# Patient Record
Sex: FEMALE | Race: ASIAN | Employment: FULL TIME | URBAN - METROPOLITAN AREA
[De-identification: names, ages, dates, MRNs, and addresses within clinical notes are randomized per-mention and may not be internally consistent; named-entity substitution may affect disease eponyms.]

---

## 2017-02-17 ENCOUNTER — PATIENT MESSAGE (OUTPATIENT)
Dept: FAMILY MEDICINE CLINIC | Facility: CLINIC | Age: 27
End: 2017-02-17

## 2017-02-17 NOTE — TELEPHONE ENCOUNTER
From: Dorie Quiroga  To: Kelley Lugo MD  Sent: 2/17/2017 12:41 PM CST  Subject: Other    Hello    My name is Daniel Patel. I am a patient of Dr. Gemini Vences.  I need to schedule an appointment for my annual check ups with her, but I was wondering if I c

## 2017-03-17 ENCOUNTER — OFFICE VISIT (OUTPATIENT)
Dept: FAMILY MEDICINE CLINIC | Facility: CLINIC | Age: 27
End: 2017-03-17

## 2017-03-17 VITALS
OXYGEN SATURATION: 98 % | TEMPERATURE: 99 F | HEIGHT: 59 IN | DIASTOLIC BLOOD PRESSURE: 60 MMHG | HEART RATE: 78 BPM | SYSTOLIC BLOOD PRESSURE: 104 MMHG | WEIGHT: 144.5 LBS | BODY MASS INDEX: 29.13 KG/M2 | RESPIRATION RATE: 16 BRPM

## 2017-03-17 DIAGNOSIS — Z01.419 WELL WOMAN EXAM WITH ROUTINE GYNECOLOGICAL EXAM: Primary | ICD-10-CM

## 2017-03-17 DIAGNOSIS — Z76.89 ESTABLISHING CARE WITH NEW DOCTOR, ENCOUNTER FOR: ICD-10-CM

## 2017-03-17 DIAGNOSIS — E55.9 VITAMIN D DEFICIENCY: ICD-10-CM

## 2017-03-17 DIAGNOSIS — Z00.00 LABORATORY EXAM ORDERED AS PART OF ROUTINE GENERAL MEDICAL EXAMINATION: ICD-10-CM

## 2017-03-17 PROCEDURE — 99395 PREV VISIT EST AGE 18-39: CPT | Performed by: FAMILY MEDICINE

## 2017-03-17 PROCEDURE — 88175 CYTOPATH C/V AUTO FLUID REDO: CPT | Performed by: FAMILY MEDICINE

## 2017-03-17 PROCEDURE — 87491 CHLMYD TRACH DNA AMP PROBE: CPT | Performed by: FAMILY MEDICINE

## 2017-03-17 PROCEDURE — 87591 N.GONORRHOEAE DNA AMP PROB: CPT | Performed by: FAMILY MEDICINE

## 2017-03-17 RX ORDER — ERGOCALCIFEROL 1.25 MG/1
50000 CAPSULE ORAL
COMMUNITY
End: 2017-12-02

## 2017-03-17 NOTE — PATIENT INSTRUCTIONS
Prevention Guidelines, Women Ages 25 to 44  Screening tests and vaccines are an important part of managing your health. Health counseling is essential, too. Below are guidelines for these, for women ages 25 to 44.  Talk with your healthcare provider to ma Chickenpox (varicella) All women in this age group who have no record of this infection or vaccine 2 doses; the second dose should be given 4 to 8 weeks after the first dose   Hepatitis A Women at increased risk for infection – talk with your healthcare pr Breast cancer and chemoprevention Women at high risk for breast cancer When your risk is known   Diet and exercise Women who are overweight or obese When diagnosed, and then at routine exams   Domestic violence Women at the age in which they are able to ha Many experts now say that women should focus on breast self-awareness instead of doing a breast self-examination (BSE).  These experts include the [address] Task Force, and the [facility] [employer] a © 5686-5955 [address], [address] [place]. All rights reserved. This information is not intended as a substitute for professional medical care. Always follow your healthcare professional's instructions.

## 2017-03-17 NOTE — PROGRESS NOTES
Patient presents with: Well Adult: pap  Establish Care      HPI:  27yr old female presents as a new patient for [employer] and to establish care. Doing well overall. No concerns per pt. Last PAP 3yrs ago and nl. No hx of abnormal paps or STDs.  Menses were regula Use: Yes                Comment: 1-2 beers a week        Current Outpatient Prescriptions:  ergocalciferol 09720 units Oral Cap Take 50,000 Units by mouth every 7 days.  Disp:  Rfl:        No Known Allergies      Physical Exam  /60 mmHg  Pulse 78  Tem 04/13/2011      HEP B                 03/30/2010 05/26/2010 08/24/2010      IPV                   03/30/2010 05/26/2010      MMR                   09/10/2008  10/21/2008      Meningococcal (Menomune)                          10/21/2008      TDAP

## 2017-03-19 LAB
C TRACH DNA SPEC QL NAA+PROBE: NEGATIVE
N GONORRHOEA DNA SPEC QL NAA+PROBE: NEGATIVE

## 2017-03-20 ENCOUNTER — TELEPHONE (OUTPATIENT)
Dept: FAMILY MEDICINE CLINIC | Facility: CLINIC | Age: 27
End: 2017-03-20

## 2017-03-20 NOTE — TELEPHONE ENCOUNTER
Spoke with patient who stated she was experiencing painful urination last night after having to hold her urine for several hours, she also noted sm blood strand, symptoms have improved since yesterday , offered her appointment but she refused stating she w

## 2017-03-23 ENCOUNTER — LAB ENCOUNTER (OUTPATIENT)
Dept: LAB | Facility: HOSPITAL | Age: 27
End: 2017-03-23
Attending: FAMILY MEDICINE
Payer: COMMERCIAL

## 2017-03-23 DIAGNOSIS — E55.9 VITAMIN D DEFICIENCY: ICD-10-CM

## 2017-03-23 DIAGNOSIS — Z00.00 LABORATORY EXAM ORDERED AS PART OF ROUTINE GENERAL MEDICAL EXAMINATION: ICD-10-CM

## 2017-03-23 LAB
25-HYDROXYVITAMIN D (TOTAL): 38.4 NG/ML (ref 30–100)
ALBUMIN SERPL-MCNC: 3.6 G/DL (ref 3.5–4.8)
ALP LIVER SERPL-CCNC: 114 U/L (ref 37–98)
ALT SERPL-CCNC: 59 U/L (ref 14–54)
AST SERPL-CCNC: 18 U/L (ref 15–41)
BASOPHILS # BLD AUTO: 0.08 X10(3) UL (ref 0–0.1)
BASOPHILS NFR BLD AUTO: 0.8 %
BILIRUB SERPL-MCNC: 0.6 MG/DL (ref 0.1–2)
BUN BLD-MCNC: 10 MG/DL (ref 8–20)
CALCIUM BLD-MCNC: 8.9 MG/DL (ref 8.3–10.3)
CHLORIDE: 106 MMOL/L (ref 101–111)
CHOLEST SMN-MCNC: 127 MG/DL (ref ?–200)
CO2: 25 MMOL/L (ref 22–32)
CREAT BLD-MCNC: 0.65 MG/DL (ref 0.55–1.02)
EOSINOPHIL # BLD AUTO: 0.35 X10(3) UL (ref 0–0.3)
EOSINOPHIL NFR BLD AUTO: 3.5 %
ERYTHROCYTE [DISTWIDTH] IN BLOOD BY AUTOMATED COUNT: 13.2 % (ref 11.5–16)
GLUCOSE BLD-MCNC: 99 MG/DL (ref 70–99)
HCT VFR BLD AUTO: 39.9 % (ref 34–50)
HDLC SERPL-MCNC: 44 MG/DL (ref 45–?)
HDLC SERPL: 2.89 {RATIO} (ref ?–4.44)
HGB BLD-MCNC: 12.9 G/DL (ref 12–16)
IMMATURE GRANULOCYTE COUNT: 0.04 X10(3) UL (ref 0–1)
IMMATURE GRANULOCYTE RATIO %: 0.4 %
LDLC SERPL CALC-MCNC: 70 MG/DL (ref ?–130)
LYMPHOCYTES # BLD AUTO: 2.95 X10(3) UL (ref 0.9–4)
LYMPHOCYTES NFR BLD AUTO: 29.6 %
M PROTEIN MFR SERPL ELPH: 7.5 G/DL (ref 6.1–8.3)
MCH RBC QN AUTO: 28.4 PG (ref 27–33.2)
MCHC RBC AUTO-ENTMCNC: 32.3 G/DL (ref 31–37)
MCV RBC AUTO: 87.7 FL (ref 81–100)
MONOCYTES # BLD AUTO: 0.86 X10(3) UL (ref 0.1–0.6)
MONOCYTES NFR BLD AUTO: 8.6 %
NEUTROPHIL ABS PRELIM: 5.67 X10 (3) UL (ref 1.3–6.7)
NEUTROPHILS # BLD AUTO: 5.67 X10(3) UL (ref 1.3–6.7)
NEUTROPHILS NFR BLD AUTO: 57.1 %
NONHDLC SERPL-MCNC: 83 MG/DL (ref ?–130)
PLATELET # BLD AUTO: 249 10(3)UL (ref 150–450)
POTASSIUM SERPL-SCNC: 4 MMOL/L (ref 3.6–5.1)
RBC # BLD AUTO: 4.55 X10(6)UL (ref 3.8–5.1)
RED CELL DISTRIBUTION WIDTH-SD: 42.8 FL (ref 35.1–46.3)
SODIUM SERPL-SCNC: 140 MMOL/L (ref 136–144)
TRIGLYCERIDES: 67 MG/DL (ref ?–150)
TSI SER-ACNC: 1.97 MIU/ML (ref 0.35–5.5)
VLDL: 13 MG/DL (ref 5–40)
WBC # BLD AUTO: 10 X10(3) UL (ref 4–13)

## 2017-03-23 PROCEDURE — 82306 VITAMIN D 25 HYDROXY: CPT

## 2017-03-23 PROCEDURE — 80053 COMPREHEN METABOLIC PANEL: CPT

## 2017-03-23 PROCEDURE — 36415 COLL VENOUS BLD VENIPUNCTURE: CPT

## 2017-03-23 PROCEDURE — 80061 LIPID PANEL: CPT

## 2017-03-23 PROCEDURE — 85025 COMPLETE CBC W/AUTO DIFF WBC: CPT

## 2017-03-23 PROCEDURE — 84443 ASSAY THYROID STIM HORMONE: CPT

## 2017-03-27 ENCOUNTER — OFFICE VISIT (OUTPATIENT)
Dept: FAMILY MEDICINE CLINIC | Facility: CLINIC | Age: 27
End: 2017-03-27

## 2017-03-27 VITALS
HEIGHT: 59 IN | WEIGHT: 144 LBS | HEART RATE: 78 BPM | DIASTOLIC BLOOD PRESSURE: 68 MMHG | BODY MASS INDEX: 29.03 KG/M2 | SYSTOLIC BLOOD PRESSURE: 108 MMHG | RESPIRATION RATE: 16 BRPM | TEMPERATURE: 98 F | OXYGEN SATURATION: 99 %

## 2017-03-27 DIAGNOSIS — E55.9 VITAMIN D INSUFFICIENCY: ICD-10-CM

## 2017-03-27 DIAGNOSIS — N30.00 ACUTE CYSTITIS WITHOUT HEMATURIA: Primary | ICD-10-CM

## 2017-03-27 DIAGNOSIS — R79.89 ELEVATED LIVER FUNCTION TESTS: ICD-10-CM

## 2017-03-27 LAB
MULTISTIX LOT#: NORMAL NUMERIC
PH, URINE: 7 (ref 4.5–8)
SPECIFIC GRAVITY: 1.02 (ref 1–1.03)
URINE-COLOR: YELLOW
UROBILINOGEN,SEMI-QN: 1 MG/DL (ref 0–1.9)

## 2017-03-27 PROCEDURE — 81003 URINALYSIS AUTO W/O SCOPE: CPT | Performed by: FAMILY MEDICINE

## 2017-03-27 PROCEDURE — 87088 URINE BACTERIA CULTURE: CPT | Performed by: FAMILY MEDICINE

## 2017-03-27 PROCEDURE — 87186 SC STD MICRODIL/AGAR DIL: CPT | Performed by: FAMILY MEDICINE

## 2017-03-27 PROCEDURE — 99214 OFFICE O/P EST MOD 30 MIN: CPT | Performed by: FAMILY MEDICINE

## 2017-03-27 PROCEDURE — 87086 URINE CULTURE/COLONY COUNT: CPT | Performed by: FAMILY MEDICINE

## 2017-03-27 RX ORDER — NITROFURANTOIN 25; 75 MG/1; MG/1
100 CAPSULE ORAL 2 TIMES DAILY
Qty: 10 CAPSULE | Refills: 0 | Status: SHIPPED | OUTPATIENT
Start: 2017-03-27 | End: 2017-04-01

## 2017-03-27 NOTE — PROGRESS NOTES
Amy Cortes is a 32year old female. HPI:   Patient presents with symptoms of UTI. Complaining of urinary frequency, urgency, and dysuria. Denies back pain, fever, hematuria, nausea and vomiting.  States she was withholding her urine the other day and s resolved  - cont vitamin d3 1000u daily otc    The patient indicates understanding of these issues and agrees to the plan. The patient is asked to return in 3 days if not better. Call if fever, vomiting, worsening symptoms.

## 2017-04-28 ENCOUNTER — OFFICE VISIT (OUTPATIENT)
Dept: FAMILY MEDICINE CLINIC | Facility: CLINIC | Age: 27
End: 2017-04-28

## 2017-04-28 DIAGNOSIS — B35.1 ONYCHOMYCOSIS: ICD-10-CM

## 2017-04-28 DIAGNOSIS — N92.6 IRREGULAR MENSES: ICD-10-CM

## 2017-04-28 DIAGNOSIS — N92.6 MISSED PERIOD: ICD-10-CM

## 2017-04-28 DIAGNOSIS — J01.00 ACUTE MAXILLARY SINUSITIS, RECURRENCE NOT SPECIFIED: Primary | ICD-10-CM

## 2017-04-28 PROCEDURE — 81025 URINE PREGNANCY TEST: CPT | Performed by: FAMILY MEDICINE

## 2017-04-28 PROCEDURE — 99214 OFFICE O/P EST MOD 30 MIN: CPT | Performed by: FAMILY MEDICINE

## 2017-04-28 RX ORDER — AMOXICILLIN 875 MG/1
875 TABLET, COATED ORAL 2 TIMES DAILY
Qty: 20 TABLET | Refills: 0 | Status: SHIPPED | OUTPATIENT
Start: 2017-04-28 | End: 2017-05-08

## 2017-04-28 NOTE — PROGRESS NOTES
HPI:   Valerie Cage is a 32year old female who presents for upper respiratory symptoms, missed/irregular periods and c/o nail changes. URI symptoms for  3-4  days.  Patient reports sore throat, congestion, low grade fever, dry cough, ear pain, sinus pa normocephalic, mild effusion behind both TMs, erythematous nasal mucosa, and throat is clear  NECK: supple,no adenopathy   LUNGS: clear to auscultation  CARDIO: RRR without murmur  GI: good BS's,no masses, HSM or tenderness    ASSESSMENT AND PLAN:   Abbey Lomeli

## 2017-04-28 NOTE — PATIENT INSTRUCTIONS
Self-Care for Sinusitis     Drinking plenty of water can help sinuses drain. Sinusitis can often be managed with self-care. Self-care can keep sinuses moist and make you feel more comfortable. Remember to follow your doctor's instructions closely.  This Normally, the uterine lining builds up and is shed each month during a woman’s period. With amenorrhea, this process does not happen. Menstruation occurs when a woman sheds the lining of her uterus (womb).  It is also called a “period.” For most women, th © 8566-9997 [address], [address] [place]. All rights reserved. This information is not intended as a substitute for professional medical care. Always follow your healthcare professional's instructions.

## 2017-04-29 VITALS
SYSTOLIC BLOOD PRESSURE: 102 MMHG | HEART RATE: 67 BPM | WEIGHT: 144.19 LBS | BODY MASS INDEX: 29.07 KG/M2 | RESPIRATION RATE: 18 BRPM | OXYGEN SATURATION: 99 % | DIASTOLIC BLOOD PRESSURE: 64 MMHG | TEMPERATURE: 99 F | HEIGHT: 59 IN

## 2017-06-29 ENCOUNTER — OFFICE VISIT (OUTPATIENT)
Dept: FAMILY MEDICINE CLINIC | Facility: CLINIC | Age: 27
End: 2017-06-29

## 2017-06-29 VITALS
DIASTOLIC BLOOD PRESSURE: 60 MMHG | HEIGHT: 59 IN | TEMPERATURE: 99 F | OXYGEN SATURATION: 98 % | RESPIRATION RATE: 16 BRPM | HEART RATE: 68 BPM | BODY MASS INDEX: 28.63 KG/M2 | SYSTOLIC BLOOD PRESSURE: 108 MMHG | WEIGHT: 142 LBS

## 2017-06-29 DIAGNOSIS — R11.2 NON-INTRACTABLE VOMITING WITH NAUSEA, UNSPECIFIED VOMITING TYPE: Primary | ICD-10-CM

## 2017-06-29 DIAGNOSIS — K64.0 GRADE I HEMORRHOIDS: ICD-10-CM

## 2017-06-29 PROCEDURE — 81025 URINE PREGNANCY TEST: CPT | Performed by: FAMILY MEDICINE

## 2017-06-29 PROCEDURE — 99214 OFFICE O/P EST MOD 30 MIN: CPT | Performed by: FAMILY MEDICINE

## 2017-06-29 NOTE — PROGRESS NOTES
HPI:    Patient ID: Alma Acosta is a 32year old female. HPI   27yr old female presents with c/o nausea and vomiting that began last night and flare up of her hemorrhoids. Began feeling nauseated last night and had one episode of nb,nb emesis.  State type  - urine hcg neg  - advised to push fluids, bland foods and advance as tolerated  - URINE PREGNANCY TEST    2. Grade I hemorrhoids  - advised symptomatic tx: sitz baths, start miralax daily to help prevent constipation, increase fruits/vegetables, inc

## 2017-09-15 ENCOUNTER — OFFICE VISIT (OUTPATIENT)
Dept: FAMILY MEDICINE CLINIC | Facility: CLINIC | Age: 27
End: 2017-09-15

## 2017-09-15 VITALS
WEIGHT: 143 LBS | OXYGEN SATURATION: 99 % | HEIGHT: 59 IN | HEART RATE: 77 BPM | SYSTOLIC BLOOD PRESSURE: 100 MMHG | BODY MASS INDEX: 28.83 KG/M2 | DIASTOLIC BLOOD PRESSURE: 62 MMHG | TEMPERATURE: 98 F | RESPIRATION RATE: 16 BRPM

## 2017-09-15 DIAGNOSIS — J30.89 CHRONIC NON-SEASONAL ALLERGIC RHINITIS, UNSPECIFIED TRIGGER: ICD-10-CM

## 2017-09-15 DIAGNOSIS — K29.00 ACUTE GASTRITIS WITHOUT HEMORRHAGE, UNSPECIFIED GASTRITIS TYPE: Primary | ICD-10-CM

## 2017-09-15 PROCEDURE — 99214 OFFICE O/P EST MOD 30 MIN: CPT | Performed by: FAMILY MEDICINE

## 2017-09-15 RX ORDER — OMEPRAZOLE 40 MG/1
40 CAPSULE, DELAYED RELEASE ORAL
Qty: 30 CAPSULE | Refills: 1 | Status: SHIPPED | OUTPATIENT
Start: 2017-09-15 | End: 2017-09-20 | Stop reason: DRUGHIGH

## 2017-09-16 ENCOUNTER — APPOINTMENT (OUTPATIENT)
Dept: LAB | Age: 27
End: 2017-09-16
Attending: INTERNAL MEDICINE
Payer: COMMERCIAL

## 2017-09-16 DIAGNOSIS — K29.00 ACUTE GASTRITIS WITHOUT HEMORRHAGE, UNSPECIFIED GASTRITIS TYPE: ICD-10-CM

## 2017-09-16 PROCEDURE — 83013 H PYLORI (C-13) BREATH: CPT

## 2017-09-18 LAB — H. PYLORI BREATH TEST: POSITIVE

## 2017-09-18 NOTE — PROGRESS NOTES
HPI:    Patient ID: Cookie Chen is a 32year old female. HPI  27yr old female presents with c/o LUQ abdominal pain and nausea for the past week and L ear drainage for the past day. LUQ abd pain with nausea, started about 1wk ago.  States she has not clear and moist.   Eyes: EOM are normal. Pupils are equal, round, and reactive to light. Neck: Neck supple. Cardiovascular: Normal rate and regular rhythm. Pulmonary/Chest: Effort normal and breath sounds normal. No respiratory distress.  She has no Sig: Take 1 capsule (40 mg total) by mouth every morning before breakfast. About 30-60min           Imaging & Referrals:  None       #[id number]

## 2017-09-20 ENCOUNTER — OFFICE VISIT (OUTPATIENT)
Dept: FAMILY MEDICINE CLINIC | Facility: CLINIC | Age: 27
End: 2017-09-20

## 2017-09-20 VITALS
TEMPERATURE: 98 F | OXYGEN SATURATION: 99 % | SYSTOLIC BLOOD PRESSURE: 104 MMHG | RESPIRATION RATE: 16 BRPM | HEIGHT: 59 IN | HEART RATE: 86 BPM | BODY MASS INDEX: 29.26 KG/M2 | WEIGHT: 145.13 LBS | DIASTOLIC BLOOD PRESSURE: 60 MMHG

## 2017-09-20 DIAGNOSIS — Z78.9 ATTEMPTING TO CONCEIVE: ICD-10-CM

## 2017-09-20 DIAGNOSIS — A04.8 HELICOBACTER PYLORI INFECTION: Primary | ICD-10-CM

## 2017-09-20 PROCEDURE — 99214 OFFICE O/P EST MOD 30 MIN: CPT | Performed by: FAMILY MEDICINE

## 2017-09-20 RX ORDER — CLARITHROMYCIN 500 MG/1
500 TABLET, COATED ORAL 2 TIMES DAILY
Qty: 28 TABLET | Refills: 0 | Status: SHIPPED | OUTPATIENT
Start: 2017-09-20 | End: 2017-10-04

## 2017-09-20 RX ORDER — NICOTINE POLACRILEX 4 MG/1
20 GUM, CHEWING ORAL 2 TIMES DAILY
Qty: 28 TABLET | Refills: 0 | Status: SHIPPED | OUTPATIENT
Start: 2017-09-20 | End: 2017-10-04

## 2017-09-20 RX ORDER — METRONIDAZOLE 500 MG/1
500 TABLET ORAL 2 TIMES DAILY
Qty: 28 TABLET | Refills: 0 | Status: SHIPPED | OUTPATIENT
Start: 2017-09-20 | End: 2017-10-04

## 2017-09-21 NOTE — PROGRESS NOTES
HPI:    Patient ID: Jessica Brenner is a 32year old female. HPI   27yr old female presents for f/u after recent lab testing and on gastritis. Would like advice on conceiving as well.     Review of Systems   Constitutional: Negative for appetite change, Helicobacter pylori infection  - h. pylori breath test (9/16/17): positive  - will tx with triple therapy: metronidazole 500mg po bid, clarithromycin 500mg po bid and omeprazole 20mg po bid x 14d, reviewed indications, dosing and SEs  - test of cure in [place]
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
## 2017-10-25 ENCOUNTER — TELEPHONE (OUTPATIENT)
Dept: FAMILY MEDICINE CLINIC | Facility: CLINIC | Age: 27
End: 2017-10-25

## 2017-10-25 NOTE — TELEPHONE ENCOUNTER
Patient stated she is doing fine and is going back on her medication today, she states that she got information for treatment for therapy and is following up.

## 2017-10-25 NOTE — TELEPHONE ENCOUNTER
----- Message from [facility], DO sent at 10/25/2017 10:40 AM CDT -----  Regarding: condition update  Pls check on pt status.

## 2017-10-31 ENCOUNTER — TELEPHONE (OUTPATIENT)
Dept: FAMILY MEDICINE CLINIC | Facility: CLINIC | Age: 27
End: 2017-10-31

## 2017-10-31 NOTE — TELEPHONE ENCOUNTER
S/w pt and informed her it it's likely a muscle strain as it hurts with certain positions and actions. Advised to try ibuprofen prn, ice/heat and gentle stretches. Monitor for any worsening s/s.  Pt due to f/u next week for re-eval and states she will make
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
## 2017-10-31 NOTE — TELEPHONE ENCOUNTER
Patient stated that since last visit 10/23/2017, she is still having throat pain ,mainly on the right side and having difficulty when swallowing, she states the pain is there in the morning when she wakes up and feels like there is a lump in her throat but

## 2017-11-21 ENCOUNTER — TELEPHONE (OUTPATIENT)
Dept: FAMILY MEDICINE CLINIC | Facility: CLINIC | Age: 27
End: 2017-11-21

## 2017-12-03 NOTE — PROGRESS NOTES
HPI:    Patient ID: Cookie Chen is a 32year old female. HPI  27yr old female presents for f/u on dysthymia, vitamin d deficiency and allergies. Dysthymia, started on fluoxetine 10mg daily approximately 1mo ago.  Notes improvement in mood, sleep, suzie rales.   Abdominal: Soft. Bowel sounds are normal. She exhibits no distension. There is no tenderness. There is no rebound. Neurological: She is alert and oriented to person, place, and time. Skin: Skin is warm and dry.    Psychiatric: She has a normal

## 2017-12-27 ENCOUNTER — OFFICE VISIT (OUTPATIENT)
Dept: FAMILY MEDICINE CLINIC | Facility: CLINIC | Age: 27
End: 2017-12-27

## 2017-12-27 VITALS
TEMPERATURE: 99 F | BODY MASS INDEX: 28.02 KG/M2 | DIASTOLIC BLOOD PRESSURE: 72 MMHG | RESPIRATION RATE: 18 BRPM | OXYGEN SATURATION: 98 % | HEART RATE: 77 BPM | WEIGHT: 139 LBS | HEIGHT: 59 IN | SYSTOLIC BLOOD PRESSURE: 110 MMHG

## 2017-12-27 DIAGNOSIS — N92.6 MISSED PERIOD: ICD-10-CM

## 2017-12-27 DIAGNOSIS — Z3A.01 LESS THAN 8 WEEKS GESTATION OF PREGNANCY: Primary | ICD-10-CM

## 2017-12-27 DIAGNOSIS — F34.1 DYSTHYMIA: ICD-10-CM

## 2017-12-27 PROCEDURE — 81025 URINE PREGNANCY TEST: CPT | Performed by: FAMILY MEDICINE

## 2017-12-27 PROCEDURE — 99214 OFFICE O/P EST MOD 30 MIN: CPT | Performed by: FAMILY MEDICINE

## 2017-12-28 NOTE — PROGRESS NOTES
HPI:    Patient ID: Stevenson Sunshine is a 32year old female. HPI  27yr old female presents with c/o missed period and wanting to confirm pregnancy. LMP 11/7/17. Had taken 2 pregnancy tests since yesterday and have been positive.  Denies any vaginal bleedi (primary encounter diagnosis)  Missed period  Dysthymia  - urine hcg: positive  - based on LMP 11/7/17, pt is 7wks 1d ga with KEIRY 8/14/18  - given order for OB ultrasound  - advised to start PNVs    - advised healthy eating habits and adequate hydration

## 2017-12-29 ENCOUNTER — TELEPHONE (OUTPATIENT)
Dept: FAMILY MEDICINE CLINIC | Facility: CLINIC | Age: 27
End: 2017-12-29

## 2017-12-29 DIAGNOSIS — Z3A.01 LESS THAN 8 WEEKS GESTATION OF PREGNANCY: Primary | ICD-10-CM

## 2017-12-29 NOTE — TELEPHONE ENCOUNTER
Pt would like to know if you can recc another ob/gyn the one she was referred to cannot see her until end of Jan

## 2018-01-02 ENCOUNTER — TELEPHONE (OUTPATIENT)
Dept: INTERNAL MEDICINE CLINIC | Facility: CLINIC | Age: 28
End: 2018-01-02

## 2018-01-02 ENCOUNTER — HOSPITAL ENCOUNTER (OUTPATIENT)
Dept: ULTRASOUND IMAGING | Facility: HOSPITAL | Age: 28
Discharge: HOME | End: 2018-01-02
Attending: FAMILY MEDICINE
Payer: COMMERCIAL

## 2018-01-02 DIAGNOSIS — Z3A.01 LESS THAN 8 WEEKS GESTATION OF PREGNANCY: ICD-10-CM

## 2018-01-02 PROCEDURE — 76817 TRANSVAGINAL US OBSTETRIC: CPT | Performed by: FAMILY MEDICINE

## 2018-01-02 PROCEDURE — 76801 OB US < 14 WKS SINGLE FETUS: CPT | Performed by: FAMILY MEDICINE

## 2018-01-03 NOTE — TELEPHONE ENCOUNTER
Received call from [facility] with report of STAT ultrasound for pregnancy. Single live IUP, 8 weeks, small subchorionic bleed. Attempted to call patient x 3 but went to voicemail with no identifying info given on message.  Please call patient with re

## 2018-01-03 NOTE — TELEPHONE ENCOUNTER
Pt called back, I entered her work number as call back # which she is requesting nurse uses when calling back, she said after the phone number is connected press 0 to reach her

## 2018-01-03 NOTE — TELEPHONE ENCOUNTER
Spoke with patient and she stated she was unable to get in to see ob until 01/18/2018, I placed call to Dr Windle Jeans 's office and informed of ultrasound findings, per reception , they will consult with nurse if this is an urgent finding and call me back if s

## 2018-01-03 NOTE — TELEPHONE ENCOUNTER
Spoke with patient and informed her of message per Dr Danilo Richter office and she stated that she found another Dr [facility] and has an appointment with them next week.

## 2018-01-09 LAB
HEPATITIS B SURFACE ANTIGEN OB: NEGATIVE
HIV RESULT OB: NEGATIVE
RAPID PLASMA REAGIN OB: NONREACTIVE
RH FACTOR OB: POSITIVE

## 2018-03-19 ENCOUNTER — TELEPHONE (OUTPATIENT)
Dept: FAMILY MEDICINE CLINIC | Facility: CLINIC | Age: 28
End: 2018-03-19

## 2018-03-19 NOTE — TELEPHONE ENCOUNTER
Spoke with pt and advised diabetes testing will typically be done around 28 weeks and if she has any concerns she should ask her OBG

## 2018-03-19 NOTE — TELEPHONE ENCOUNTER
Pt called stating she is pregnant. Wants to know if she should he yearly physical while pregnant. If so, do they check for diabetes?

## 2018-07-18 LAB — STREP GP B CULT OB: NEGATIVE

## 2018-08-20 ENCOUNTER — HOSPITAL ENCOUNTER (INPATIENT)
Facility: HOSPITAL | Age: 28
LOS: 3 days | Discharge: HOME | End: 2018-08-23
Attending: OBSTETRICS & GYNECOLOGY | Admitting: OBSTETRICS & GYNECOLOGY
Payer: COMMERCIAL

## 2018-08-20 ENCOUNTER — TELEPHONE (OUTPATIENT)
Dept: OBGYN UNIT | Facility: HOSPITAL | Age: 28
End: 2018-08-20

## 2018-08-20 ENCOUNTER — HOSPITAL ENCOUNTER (INPATIENT)
Dept: OBGYN CLINIC | Facility: HOSPITAL | Age: 28
Discharge: HOME | End: 2018-08-20
Payer: COMMERCIAL

## 2018-08-20 PROBLEM — Z34.90 PREGNANCY (HCC): Status: ACTIVE | Noted: 2018-08-20

## 2018-08-20 PROBLEM — Z34.90 PREGNANCY: Status: ACTIVE | Noted: 2018-08-20

## 2018-08-20 LAB
ANTIBODY SCREEN: NEGATIVE
BILIRUBIN URINE: NEGATIVE
BLOOD URINE: NEGATIVE
CONTROL RUN WITHIN 24 HOURS?: YES
ERYTHROCYTE [DISTWIDTH] IN BLOOD BY AUTOMATED COUNT: 14.2 % (ref 11.5–16)
GLUCOSE URINE: NEGATIVE
HCT VFR BLD AUTO: 36.3 % (ref 34–50)
HGB BLD-MCNC: 12.2 G/DL (ref 12–16)
KETONE URINE: NEGATIVE
LEUKOCYTE ESTERASE URINE: NEGATIVE
MCH RBC QN AUTO: 30.6 PG (ref 27–33.2)
MCHC RBC AUTO-ENTMCNC: 33.6 G/DL (ref 31–37)
MCV RBC AUTO: 91 FL (ref 81–100)
NITRITE URINE: NEGATIVE
PH URINE: 7 (ref 5–8)
PLATELET # BLD AUTO: 184 10(3)UL (ref 150–450)
PROTEIN URINE: NEGATIVE
RAPID HIV: NONREACTIVE
RBC # BLD AUTO: 3.99 X10(6)UL (ref 3.8–5.1)
RED CELL DISTRIBUTION WIDTH-SD: 47.4 FL (ref 35.1–46.3)
RH BLOOD TYPE: POSITIVE
SPEC GRAVITY: 1.02 (ref 1–1.03)
T PALLIDUM AB SER QL IA: NONREACTIVE
URINE CLARITY: CLEAR
URINE COLOR: YELLOW
UROBILINOGEN URINE: 1
WBC # BLD AUTO: 12.3 X10(3) UL (ref 4–13)

## 2018-08-20 PROCEDURE — 10H07YZ INSERTION OF OTHER DEVICE INTO PRODUCTS OF CONCEPTION, VIA NATURAL OR ARTIFICIAL OPENING: ICD-10-PCS | Performed by: OBSTETRICS & GYNECOLOGY

## 2018-08-20 PROCEDURE — 3E0P7VZ INTRODUCTION OF HORMONE INTO FEMALE REPRODUCTIVE, VIA NATURAL OR ARTIFICIAL OPENING: ICD-10-PCS | Performed by: OBSTETRICS & GYNECOLOGY

## 2018-08-20 PROCEDURE — 85027 COMPLETE CBC AUTOMATED: CPT | Performed by: OBSTETRICS & GYNECOLOGY

## 2018-08-20 PROCEDURE — 86701 HIV-1ANTIBODY: CPT | Performed by: OBSTETRICS & GYNECOLOGY

## 2018-08-20 PROCEDURE — 86850 RBC ANTIBODY SCREEN: CPT | Performed by: OBSTETRICS & GYNECOLOGY

## 2018-08-20 PROCEDURE — 86901 BLOOD TYPING SEROLOGIC RH(D): CPT | Performed by: OBSTETRICS & GYNECOLOGY

## 2018-08-20 PROCEDURE — 86900 BLOOD TYPING SEROLOGIC ABO: CPT | Performed by: OBSTETRICS & GYNECOLOGY

## 2018-08-20 PROCEDURE — 81002 URINALYSIS NONAUTO W/O SCOPE: CPT

## 2018-08-20 PROCEDURE — 4A1H74Z MONITORING OF PRODUCTS OF CONCEPTION, CARDIAC ELECTRICAL ACTIVITY, VIA NATURAL OR ARTIFICIAL OPENING: ICD-10-PCS | Performed by: OBSTETRICS & GYNECOLOGY

## 2018-08-20 PROCEDURE — 3E0E7KZ INTRODUCTION OF OTHER DIAGNOSTIC SUBSTANCE INTO PRODUCTS OF CONCEPTION, VIA NATURAL OR ARTIFICIAL OPENING: ICD-10-PCS | Performed by: OBSTETRICS & GYNECOLOGY

## 2018-08-20 PROCEDURE — 86780 TREPONEMA PALLIDUM: CPT | Performed by: OBSTETRICS & GYNECOLOGY

## 2018-08-20 RX ORDER — TERBUTALINE SULFATE 1 MG/ML
0.25 INJECTION, SOLUTION SUBCUTANEOUS AS NEEDED
Status: DISCONTINUED | OUTPATIENT
Start: 2018-08-20 | End: 2018-08-21

## 2018-08-20 RX ORDER — SODIUM CHLORIDE, SODIUM LACTATE, POTASSIUM CHLORIDE, CALCIUM CHLORIDE 600; 310; 30; 20 MG/100ML; MG/100ML; MG/100ML; MG/100ML
INJECTION, SOLUTION INTRAVENOUS CONTINUOUS
Status: DISCONTINUED | OUTPATIENT
Start: 2018-08-20 | End: 2018-08-21

## 2018-08-20 RX ORDER — TRISODIUM CITRATE DIHYDRATE AND CITRIC ACID MONOHYDRATE 500; 334 MG/5ML; MG/5ML
30 SOLUTION ORAL AS NEEDED
Status: DISCONTINUED | OUTPATIENT
Start: 2018-08-20 | End: 2018-08-21

## 2018-08-20 RX ORDER — DEXTROSE, SODIUM CHLORIDE, SODIUM LACTATE, POTASSIUM CHLORIDE, AND CALCIUM CHLORIDE 5; .6; .31; .03; .02 G/100ML; G/100ML; G/100ML; G/100ML; G/100ML
INJECTION, SOLUTION INTRAVENOUS AS NEEDED
Status: DISCONTINUED | OUTPATIENT
Start: 2018-08-20 | End: 2018-08-21

## 2018-08-20 RX ORDER — FERROUS SULFATE TAB EC 324 MG (65 MG FE EQUIVALENT) 324 (65 FE) MG
TABLET DELAYED RESPONSE ORAL
COMMUNITY
End: 2019-03-18 | Stop reason: ALTCHOICE

## 2018-08-20 RX ORDER — ZOLPIDEM TARTRATE 5 MG/1
5 TABLET ORAL NIGHTLY PRN
Status: DISCONTINUED | OUTPATIENT
Start: 2018-08-20 | End: 2018-08-21

## 2018-08-20 RX ORDER — IBUPROFEN 600 MG/1
600 TABLET ORAL ONCE AS NEEDED
Status: DISCONTINUED | OUTPATIENT
Start: 2018-08-20 | End: 2018-08-21

## 2018-08-21 PROCEDURE — 0KQM0ZZ REPAIR PERINEUM MUSCLE, OPEN APPROACH: ICD-10-PCS | Performed by: OBSTETRICS & GYNECOLOGY

## 2018-08-21 PROCEDURE — 3E033VJ INTRODUCTION OF OTHER HORMONE INTO PERIPHERAL VEIN, PERCUTANEOUS APPROACH: ICD-10-PCS | Performed by: OBSTETRICS & GYNECOLOGY

## 2018-08-21 PROCEDURE — 88307 TISSUE EXAM BY PATHOLOGIST: CPT | Performed by: OBSTETRICS & GYNECOLOGY

## 2018-08-21 RX ORDER — EPHEDRINE SULFATE/0.9% NACL/PF 25 MG/5 ML
5 SYRINGE (ML) INTRAVENOUS AS NEEDED
Status: DISCONTINUED | OUTPATIENT
Start: 2018-08-21 | End: 2018-08-21

## 2018-08-21 RX ORDER — NALBUPHINE HCL 10 MG/ML
2.5 AMPUL (ML) INJECTION
Status: DISCONTINUED | OUTPATIENT
Start: 2018-08-21 | End: 2018-08-21

## 2018-08-21 RX ORDER — EPHEDRINE SULFATE/0.9% NACL/PF 25 MG/5 ML
50 SYRINGE (ML) INTRAVENOUS ONCE
Status: COMPLETED | OUTPATIENT
Start: 2018-08-21 | End: 2018-08-21

## 2018-08-21 RX ORDER — ZOLPIDEM TARTRATE 5 MG/1
5 TABLET ORAL NIGHTLY PRN
Status: DISCONTINUED | OUTPATIENT
Start: 2018-08-21 | End: 2018-08-23

## 2018-08-21 RX ORDER — SIMETHICONE 80 MG
80 TABLET,CHEWABLE ORAL 3 TIMES DAILY PRN
Status: DISCONTINUED | OUTPATIENT
Start: 2018-08-21 | End: 2018-08-23

## 2018-08-21 RX ORDER — ACETAMINOPHEN 325 MG/1
650 TABLET ORAL EVERY 6 HOURS PRN
Status: DISCONTINUED | OUTPATIENT
Start: 2018-08-21 | End: 2018-08-23

## 2018-08-21 RX ORDER — DOCUSATE SODIUM 100 MG/1
100 CAPSULE, LIQUID FILLED ORAL
Status: DISCONTINUED | OUTPATIENT
Start: 2018-08-21 | End: 2018-08-23

## 2018-08-21 RX ORDER — IBUPROFEN 600 MG/1
600 TABLET ORAL EVERY 6 HOURS
Status: DISCONTINUED | OUTPATIENT
Start: 2018-08-22 | End: 2018-08-23

## 2018-08-21 RX ORDER — BISACODYL 10 MG
10 SUPPOSITORY, RECTAL RECTAL ONCE AS NEEDED
Status: ACTIVE | OUTPATIENT
Start: 2018-08-21 | End: 2018-08-21

## 2018-08-21 NOTE — PROGRESS NOTES
Patient changed position, srom, clear fluid noted to be leaking out. Positive amnitest. Decel for 4 mins to 70-90's. Return to [facility].  Cervidil removed

## 2018-08-21 NOTE — PROGRESS NOTES
Patient had epidural did drop BP. FHT decles post epidural. She is recovering from low BP and FHT looks better. Will continue to monitor and resume pitocin with recovery of FHT.

## 2018-08-21 NOTE — PROGRESS NOTES
Informed patient we will wait to see if she starts labor on her own. May start pitocin in morning if she doesn't start jhoan on her own.

## 2018-08-21 NOTE — H&P
[facility]    PATIENT'S NAME: Georges Ruiz   ATTENDING PHYSICIAN: Mariella Renee M.D.    PATIENT ACCOUNT#:   [de-identified]    LOCATION:  [address] [place] [address]  MEDICAL RECORD #:   [id number]       YOB: 1990  ADMISSION DATE:       08/20/ teeth removal.    MEDICATIONS:  Prenatal vitamins, fish oil, and Prometrium in the beginning of the pregnancy. ALLERGIES:  No known drug allergies. Denies latex allergy. Dust mite allergy.     SOCIAL HISTORY:  She denies smoking, alcohol, or any recrea

## 2018-08-21 NOTE — PROGRESS NOTES
Patient admitted to room 108 for scheduled cervidil IOL. EFA 40+6 weeks, EFM tested and applied. Plan of care discussed with patient and .  Questions encouraged and answered

## 2018-08-21 NOTE — PROGRESS NOTES
Patient got up to bathroom, when put back on monitor decel to 90-10 for 3.5 mins with return to baseline. patient instructed not to get out of bed anymore

## 2018-08-22 LAB
BASOPHILS # BLD AUTO: 0.05 X10(3) UL (ref 0–0.1)
BASOPHILS NFR BLD AUTO: 0.2 %
EOSINOPHIL # BLD AUTO: 0.09 X10(3) UL (ref 0–0.3)
EOSINOPHIL NFR BLD AUTO: 0.4 %
ERYTHROCYTE [DISTWIDTH] IN BLOOD BY AUTOMATED COUNT: 14.6 % (ref 11.5–16)
HCT VFR BLD AUTO: 30.8 % (ref 34–50)
HGB BLD-MCNC: 10.2 G/DL (ref 12–16)
IMMATURE GRANULOCYTE COUNT: 0.29 X10(3) UL (ref 0–1)
IMMATURE GRANULOCYTE RATIO %: 1.2 %
LYMPHOCYTES # BLD AUTO: 1.79 X10(3) UL (ref 0.9–4)
LYMPHOCYTES NFR BLD AUTO: 7.1 %
MCH RBC QN AUTO: 30.7 PG (ref 27–33.2)
MCHC RBC AUTO-ENTMCNC: 33.1 G/DL (ref 31–37)
MCV RBC AUTO: 92.8 FL (ref 81–100)
MONOCYTES # BLD AUTO: 1.77 X10(3) UL (ref 0.1–1)
MONOCYTES NFR BLD AUTO: 7.1 %
NEUTROPHIL ABS PRELIM: 21.06 X10 (3) UL (ref 1.3–6.7)
NEUTROPHILS # BLD AUTO: 21.06 X10(3) UL (ref 1.3–6.7)
NEUTROPHILS NFR BLD AUTO: 84 %
PLATELET # BLD AUTO: 165 10(3)UL (ref 150–450)
RBC # BLD AUTO: 3.32 X10(6)UL (ref 3.8–5.1)
RED CELL DISTRIBUTION WIDTH-SD: 50.1 FL (ref 35.1–46.3)
WBC # BLD AUTO: 25.1 X10(3) UL (ref 4–13)

## 2018-08-22 PROCEDURE — 85025 COMPLETE CBC W/AUTO DIFF WBC: CPT | Performed by: OBSTETRICS & GYNECOLOGY

## 2018-08-22 NOTE — PROCEDURES
Vaginal Delivery Note          Juantomeka Mann Patient Status:  Inpatient    1990 MRN [id number]   Location [facility] Attending Nicolas Guzmán MD, MD   [facility] Day # 1 membranes and 3VC noted and meconium stained. Examination of the cervix, vagina, and perineum demonstrated a lacerations as mentioned above. .  The lacerations were repaired using 2-0 vicryl and 3-0 vicryl  rapide in a running locked fashion in a standar

## 2018-08-22 NOTE — PROGRESS NOTES
Pt transferred via wheelchair, carrying infant, both in stable condition to room 2210. FOB @ their side. ID bands checked and verified. Report given to Robina Hill [place].

## 2018-08-22 NOTE — L&D DELIVERY NOTE
Latrice Mills Pablo  [[id number]]    Labor Events     labor?:  No   steroids?:  None  Cervical ripening date/time:  2018  Cervical ripening type:  Cervidil  Rupture date/time:  2018 0225     Rupture type:  AROM  Fluid color:  Clear  In pink    Heart rate Absent <100 bpm >100 bpm    Reflex irritability No response Grimace Cry or active withdrawal    Muscle tone Limp Some flexion Active motion    Respiratory effort Absent Weak cry; hypoventilation Good, crying               1 Minute:   5 M

## 2018-08-22 NOTE — PROGRESS NOTES
PPD 1    S: doing well, bleeding ok, pain ok with ibuprofen  O: BP 98/60   Pulse 102   Temp 98.8 °F (37.1 °C) (Oral)   Resp 16   Ht 4' 11\" (1.499 m)   Wt 155 lb (70.3 kg)   LMP 11/07/2017   SpO2 100%   Breastfeeding?  Yes   BMI 31.31 kg/m²     Recent Labs

## 2018-08-23 VITALS
RESPIRATION RATE: 17 BRPM | BODY MASS INDEX: 31.25 KG/M2 | SYSTOLIC BLOOD PRESSURE: 102 MMHG | HEIGHT: 59 IN | OXYGEN SATURATION: 100 % | HEART RATE: 86 BPM | WEIGHT: 155 LBS | TEMPERATURE: 98 F | DIASTOLIC BLOOD PRESSURE: 58 MMHG

## 2018-08-23 RX ORDER — IBUPROFEN 600 MG/1
600 TABLET ORAL EVERY 6 HOURS
Qty: 40 TABLET | Refills: 1 | Status: SHIPPED | OUTPATIENT
Start: 2018-08-23 | End: 2018-08-23

## 2018-08-23 RX ORDER — IBUPROFEN 600 MG/1
600 TABLET ORAL EVERY 6 HOURS
Qty: 40 TABLET | Refills: 1 | Status: SHIPPED | OUTPATIENT
Start: 2018-08-23 | End: 2019-03-18 | Stop reason: ALTCHOICE

## 2018-08-23 NOTE — PROGRESS NOTES
[facility]  Post-Partum Vaginal Delivery Progress Note    Alina Plasencia Patient Status:  Inpatient    1990 MRN [id number]   [facility] 2SW-J Attending Glory Mason MD, MD   [address] Day # 3 PCP Romayne Beans, DO SUBJECTI

## 2018-08-23 NOTE — DISCHARGE SUMMARY
[facility]  Discharge Summary    Alina Plasencia Patient Status:  Inpatient    1990 MRN [id number]   [facility] 2SW-J Attending Glory Mason MD, MD   [address] Day # 3 PCP Romayne Beans, DO     Date of Admission: 2018    D

## 2018-08-23 NOTE — LACTATION NOTE
Routine follow up visit to review discharge information. Patent requested assist with latch attempt, which was not successful. Mother is very awkward holding baby at breast and tends to hold by head only with no back support given to baby.     LC attempted

## 2018-08-27 ENCOUNTER — TELEPHONE (OUTPATIENT)
Dept: OBGYN UNIT | Facility: HOSPITAL | Age: 28
End: 2018-08-27

## 2018-11-08 RX ORDER — VITAMIN A ACETATE, .BETA.-CAROTENE, ASCORBIC ACID, CHOLECALCIFEROL, .ALPHA.-TOCOPHEROL ACETATE, DL-, THIAMINE MONONITRATE, RIBOFLAVIN, NIACINAMIDE, PYRIDOXINE HYDROCHLORIDE, FOLIC ACID, CYANOCOBALAMIN, CALCIUM CARBONATE, FERROUS FUMARATE, ZINC OXIDE, AND CUPRIC OXIDE 2000; 2000; 120; 400; 22; 1.84; 3; 20; 10; 1; 12; 200; 27; 25; 2 [IU]/1; [IU]/1; MG/1; [IU]/1; MG/1; MG/1; MG/1; MG/1; MG/1; MG/1; UG/1; MG/1; MG/1; MG/1; MG/1
TABLET ORAL
Qty: 90 TABLET | Refills: 9 | OUTPATIENT
Start: 2018-11-08

## 2018-11-08 NOTE — TELEPHONE ENCOUNTER
Prenatal Vit-Fe Fumarate-FA (PREPLUS) 27-1 MG Oral Tab  TK 1 T PO  QD  Refills: 11    Pnv Prenatal Plus Multivitamin 27-1 Mg Tab 08/15/2018   90 Undefined 10 90     Rx denied has refills.

## 2018-12-26 RX ORDER — VITAMIN A ACETATE, .BETA.-CAROTENE, ASCORBIC ACID, CHOLECALCIFEROL, .ALPHA.-TOCOPHEROL ACETATE, DL-, THIAMINE MONONITRATE, RIBOFLAVIN, NIACINAMIDE, PYRIDOXINE HYDROCHLORIDE, FOLIC ACID, CYANOCOBALAMIN, CALCIUM CARBONATE, FERROUS FUMARATE, ZINC OXIDE, AND CUPRIC OXIDE 2000; 2000; 120; 400; 22; 1.84; 3; 20; 10; 1; 12; 200; 27; 25; 2 [IU]/1; [IU]/1; MG/1; [IU]/1; MG/1; MG/1; MG/1; MG/1; MG/1; MG/1; UG/1; MG/1; MG/1; MG/1; MG/1
TABLET ORAL
Qty: 90 TABLET | Refills: 9 | OUTPATIENT
Start: 2018-12-26

## 2018-12-26 NOTE — TELEPHONE ENCOUNTER
PT LAST SEEN 12-27-17  DELIVERED 8/20/18    HAVE NO DOCUMENTATION AS IF PT IS BREASTFEEDING    NEEDS APPT FOR ANNUAL EXAM

## 2019-03-18 ENCOUNTER — LAB ENCOUNTER (OUTPATIENT)
Dept: LAB | Age: 29
End: 2019-03-18
Attending: FAMILY MEDICINE
Payer: COMMERCIAL

## 2019-03-18 ENCOUNTER — OFFICE VISIT (OUTPATIENT)
Dept: FAMILY MEDICINE CLINIC | Facility: CLINIC | Age: 29
End: 2019-03-18
Payer: COMMERCIAL

## 2019-03-18 VITALS
TEMPERATURE: 98 F | WEIGHT: 136 LBS | HEART RATE: 71 BPM | DIASTOLIC BLOOD PRESSURE: 61 MMHG | SYSTOLIC BLOOD PRESSURE: 98 MMHG | OXYGEN SATURATION: 98 % | RESPIRATION RATE: 18 BRPM | HEIGHT: 59 IN | BODY MASS INDEX: 27.42 KG/M2

## 2019-03-18 DIAGNOSIS — Z00.00 LABORATORY EXAM ORDERED AS PART OF ROUTINE GENERAL MEDICAL EXAMINATION: ICD-10-CM

## 2019-03-18 DIAGNOSIS — Z11.3 SCREENING FOR VENEREAL DISEASE: ICD-10-CM

## 2019-03-18 DIAGNOSIS — Z30.09 ENCOUNTER FOR COUNSELING REGARDING CONTRACEPTION: ICD-10-CM

## 2019-03-18 DIAGNOSIS — E55.9 VITAMIN D DEFICIENCY: ICD-10-CM

## 2019-03-18 DIAGNOSIS — Z00.00 ROUTINE GENERAL MEDICAL EXAMINATION AT A HEALTH CARE FACILITY: Primary | ICD-10-CM

## 2019-03-18 LAB
ALBUMIN SERPL-MCNC: 4 G/DL (ref 3.4–5)
ALBUMIN/GLOB SERPL: 1.1 {RATIO} (ref 1–2)
ALP LIVER SERPL-CCNC: 101 U/L (ref 37–98)
ALT SERPL-CCNC: 24 U/L (ref 13–56)
ANION GAP SERPL CALC-SCNC: 6 MMOL/L (ref 0–18)
AST SERPL-CCNC: 14 U/L (ref 15–37)
BASOPHILS # BLD AUTO: 0.05 X10(3) UL (ref 0–0.2)
BASOPHILS NFR BLD AUTO: 0.7 %
BILIRUB SERPL-MCNC: 0.8 MG/DL (ref 0.1–2)
BUN BLD-MCNC: 10 MG/DL (ref 7–18)
BUN/CREAT SERPL: 15.9 (ref 10–20)
CALCIUM BLD-MCNC: 9 MG/DL (ref 8.5–10.1)
CHLORIDE SERPL-SCNC: 107 MMOL/L (ref 98–107)
CHOLEST SMN-MCNC: 120 MG/DL (ref ?–200)
CO2 SERPL-SCNC: 26 MMOL/L (ref 21–32)
CREAT BLD-MCNC: 0.63 MG/DL (ref 0.55–1.02)
DEPRECATED RDW RBC AUTO: 44.2 FL (ref 35.1–46.3)
EOSINOPHIL # BLD AUTO: 0.18 X10(3) UL (ref 0–0.7)
EOSINOPHIL NFR BLD AUTO: 2.4 %
ERYTHROCYTE [DISTWIDTH] IN BLOOD BY AUTOMATED COUNT: 13.4 % (ref 11–15)
GLOBULIN PLAS-MCNC: 3.5 G/DL (ref 2.8–4.4)
GLUCOSE BLD-MCNC: 91 MG/DL (ref 70–99)
HCT VFR BLD AUTO: 40.4 % (ref 35–48)
HDLC SERPL-MCNC: 50 MG/DL (ref 40–59)
HGB BLD-MCNC: 13 G/DL (ref 12–16)
IMM GRANULOCYTES # BLD AUTO: 0.02 X10(3) UL (ref 0–1)
IMM GRANULOCYTES NFR BLD: 0.3 %
LDLC SERPL CALC-MCNC: 56 MG/DL (ref ?–100)
LYMPHOCYTES # BLD AUTO: 2.59 X10(3) UL (ref 1–4)
LYMPHOCYTES NFR BLD AUTO: 33.9 %
M PROTEIN MFR SERPL ELPH: 7.5 G/DL (ref 6.4–8.2)
MCH RBC QN AUTO: 29 PG (ref 26–34)
MCHC RBC AUTO-ENTMCNC: 32.2 G/DL (ref 31–37)
MCV RBC AUTO: 90.2 FL (ref 80–100)
MONOCYTES # BLD AUTO: 0.64 X10(3) UL (ref 0.1–1)
MONOCYTES NFR BLD AUTO: 8.4 %
NEUTROPHILS # BLD AUTO: 4.15 X10 (3) UL (ref 1.5–7.7)
NEUTROPHILS # BLD AUTO: 4.15 X10(3) UL (ref 1.5–7.7)
NEUTROPHILS NFR BLD AUTO: 54.3 %
NONHDLC SERPL-MCNC: 70 MG/DL (ref ?–130)
OSMOLALITY SERPL CALC.SUM OF ELEC: 287 MOSM/KG (ref 275–295)
PLATELET # BLD AUTO: 206 10(3)UL (ref 150–450)
POTASSIUM SERPL-SCNC: 4.1 MMOL/L (ref 3.5–5.1)
RBC # BLD AUTO: 4.48 X10(6)UL (ref 3.8–5.3)
SODIUM SERPL-SCNC: 139 MMOL/L (ref 136–145)
TRIGL SERPL-MCNC: 68 MG/DL (ref 30–149)
TSI SER-ACNC: 0.97 MIU/ML (ref 0.36–3.74)
VIT D+METAB SERPL-MCNC: 33.5 NG/ML (ref 30–100)
VLDLC SERPL CALC-MCNC: 14 MG/DL (ref 0–30)
WBC # BLD AUTO: 7.6 X10(3) UL (ref 4–11)

## 2019-03-18 PROCEDURE — 87491 CHLMYD TRACH DNA AMP PROBE: CPT | Performed by: FAMILY MEDICINE

## 2019-03-18 PROCEDURE — 80050 GENERAL HEALTH PANEL: CPT | Performed by: FAMILY MEDICINE

## 2019-03-18 PROCEDURE — 82306 VITAMIN D 25 HYDROXY: CPT | Performed by: FAMILY MEDICINE

## 2019-03-18 PROCEDURE — 87591 N.GONORRHOEAE DNA AMP PROB: CPT | Performed by: FAMILY MEDICINE

## 2019-03-18 PROCEDURE — 80061 LIPID PANEL: CPT | Performed by: FAMILY MEDICINE

## 2019-03-18 PROCEDURE — 99395 PREV VISIT EST AGE 18-39: CPT | Performed by: FAMILY MEDICINE

## 2019-03-18 NOTE — PATIENT INSTRUCTIONS
Prevention Guidelines, Women Ages 25 to 44  Screening tests and vaccines are an important part of managing your health. A screening test is done to find possible disorders or diseases in people who don't have any symptoms.  The goal is to find a disease e Type 2 diabetes All women with prediabetes Every year   Gonorrhea Sexually active women at increased risk for infection At routine exams   Hepatitis C Anyone at increased risk At routine exams   HIV All women should be tested at least once for HIV between Meningococcal Women at increased risk for infection should talk with their healthcare provider 1 or more doses   Pneumococcal conjugate vaccine (PCV13) and pneumococcal polysaccharide vaccine (PPSV23) Women at increased risk for infection should talk with © 2683-2732 [employer] [address] [facility], [facility] [place]. All rights reserved. This information is not intended as a substitute for professional medical care. Always follow your healthcare professional's instructions.

## 2019-03-18 NOTE — PROGRESS NOTES
Patient presents with:  Physical      HPI:  29yr old female with vit d deficiency presents for routine adult physical and contraception counseling. Doing well overall. Menses regular, LMP 2/27/19, lasts about 5d. Last pap in 2017 and nl.  No hx of abnl pa every 7 days.  Disp: 4 capsule Rfl: 2       No Known Allergies      Physical Exam  BP 98/61   Pulse 71   Temp 97.9 °F (36.6 °C) (Oral)   Resp 18   Ht 59\"   Wt 136 lb   LMP 02/21/2019   SpO2 98%   BMI 27.47 kg/m²   Constitutional: Oriented to person, place, recommends screening for cervical cancer in women ages 24 to 72 years with cytology (Pap smear) every 3 years or, for women ages 27 to 72 years who want to lengthen the screening interval, screening with a combination of cytology and human papillomavirus (

## 2019-03-19 LAB
C TRACH DNA SPEC QL NAA+PROBE: NEGATIVE
N GONORRHOEA DNA SPEC QL NAA+PROBE: NEGATIVE

## 2019-03-26 ENCOUNTER — OFFICE VISIT (OUTPATIENT)
Dept: FAMILY MEDICINE CLINIC | Facility: CLINIC | Age: 29
End: 2019-03-26
Payer: COMMERCIAL

## 2019-03-26 VITALS
SYSTOLIC BLOOD PRESSURE: 102 MMHG | RESPIRATION RATE: 18 BRPM | BODY MASS INDEX: 28.43 KG/M2 | WEIGHT: 141 LBS | DIASTOLIC BLOOD PRESSURE: 70 MMHG | HEART RATE: 86 BPM | HEIGHT: 59 IN | TEMPERATURE: 98 F | OXYGEN SATURATION: 98 %

## 2019-03-26 DIAGNOSIS — F43.9 STRESS AT HOME: ICD-10-CM

## 2019-03-26 DIAGNOSIS — Z63.8 FAMILY CONFLICT: ICD-10-CM

## 2019-03-26 DIAGNOSIS — J40 BRONCHITIS: Primary | ICD-10-CM

## 2019-03-26 PROCEDURE — 99213 OFFICE O/P EST LOW 20 MIN: CPT | Performed by: FAMILY MEDICINE

## 2019-03-26 RX ORDER — AZITHROMYCIN 250 MG/1
TABLET, FILM COATED ORAL
Qty: 6 TABLET | Refills: 0 | Status: SHIPPED | OUTPATIENT
Start: 2019-03-26 | End: 2019-04-22 | Stop reason: ALTCHOICE

## 2019-03-26 SDOH — SOCIAL STABILITY - SOCIAL INSECURITY: OTHER SPECIFIED PROBLEMS RELATED TO PRIMARY SUPPORT GROUP: Z63.8

## 2019-03-26 NOTE — PROGRESS NOTES
HPI:   Anton Shukla is a 34year old female who presents for upper respiratory symptoms and stress at home. C/o URI symptoms for the past 2wks.  Patient reports itchy/sore throat only at the beginning of sx's, congestion, cough with greenish/yellow color CARDIO: RRR without murmur    ASSESSMENT AND PLAN:   Becky Bolaños is a 34year old female who presents with:  1.  Bronchitis  - advised symptomatic tx: push fluids, keep well hydrated, salt water gargles, tea with honey/lemon, antihistamine nightly, aleah

## 2019-04-09 ENCOUNTER — TELEPHONE (OUTPATIENT)
Dept: FAMILY MEDICINE CLINIC | Facility: CLINIC | Age: 29
End: 2019-04-09

## 2019-04-09 NOTE — TELEPHONE ENCOUNTER
Called pt to verify that she has checked with her insurance regarding coverage    Pt definitely wants the Masina 49 IUD no matter what the cost is    We can order through our system    Pt just asking that we send her a My chart message with the approx cost

## 2019-04-11 ENCOUNTER — TELEPHONE (OUTPATIENT)
Dept: FAMILY MEDICINE CLINIC | Facility: CLINIC | Age: 29
End: 2019-04-11

## 2019-04-11 NOTE — TELEPHONE ENCOUNTER
LM  Message for patient informing her that [employer] Certain IUD is in the office and that she is ok to schedule iud  Placement .

## 2019-04-15 NOTE — TELEPHONE ENCOUNTER
Patient returned call and scheduled IUD placement.     Future Appointments   Date Time Provider Frankie Kumar   4/22/2019  3:30 PM Balaji Guy,  [facility]

## 2019-04-22 ENCOUNTER — OFFICE VISIT (OUTPATIENT)
Dept: FAMILY MEDICINE CLINIC | Facility: CLINIC | Age: 29
End: 2019-04-22
Payer: COMMERCIAL

## 2019-04-22 VITALS
WEIGHT: 144 LBS | HEART RATE: 93 BPM | HEIGHT: 59 IN | BODY MASS INDEX: 29.03 KG/M2 | RESPIRATION RATE: 16 BRPM | DIASTOLIC BLOOD PRESSURE: 64 MMHG | OXYGEN SATURATION: 99 % | TEMPERATURE: 98 F | SYSTOLIC BLOOD PRESSURE: 102 MMHG

## 2019-04-22 DIAGNOSIS — J30.9 ALLERGIC RHINITIS, UNSPECIFIED SEASONALITY, UNSPECIFIED TRIGGER: ICD-10-CM

## 2019-04-22 DIAGNOSIS — Z30.430 ENCOUNTER FOR INSERTION OF INTRAUTERINE CONTRACEPTIVE DEVICE: Primary | ICD-10-CM

## 2019-04-22 PROCEDURE — 81025 URINE PREGNANCY TEST: CPT | Performed by: FAMILY MEDICINE

## 2019-04-22 PROCEDURE — 58300 INSERT INTRAUTERINE DEVICE: CPT | Performed by: FAMILY MEDICINE

## 2019-04-22 PROCEDURE — 99213 OFFICE O/P EST LOW 20 MIN: CPT | Performed by: FAMILY MEDICINE

## 2019-04-22 NOTE — PROGRESS NOTES
HPI:    Patient ID: Jessica Brenner is a 34year old female. HPI   29yr old  female presents for [facility] IUD insertion. Denies any vaginal or UTI symptoms. LMP: 3/28/19. C/o nasal congestion and rhinorrhea since last night.  Denies any f/c, cough, sore for [facility] IUD placement, all risks and benefits were discussed. After all questions were answered, the pt was positioned in stirups for procedure. On exam, a small retroverted uterus was found.  The sterile speculum was placed a normal cervix seen, this w

## 2019-04-22 NOTE — PATIENT INSTRUCTIONS
Birth Control: IUD (Intrauterine Device)    The IUD (intrauterine device) is small, flexible, and T-shaped. A trained healthcare provider places it in the uterus. The IUD is one of the most effective birth control methods. It is also reversible.  This oj · A sexually transmitted infection (STI) or possible STI  · Liver problems  · Blood clots (for progestin IUD only)  · Breast cancer or a history of breast cancer (progestin IUD only)   Date Last Reviewed: 3/1/2017  © 0347-5358 [employer] [address]. 80

## 2019-04-25 ENCOUNTER — TELEPHONE (OUTPATIENT)
Dept: FAMILY MEDICINE CLINIC | Facility: CLINIC | Age: 29
End: 2019-04-25

## 2019-04-25 NOTE — TELEPHONE ENCOUNTER
Pt was just here for IUD insertion on 4/22/19    Symptoms \"are exactly the same as last month\"  Pt was caring for son who has been sick x 2 weeks \"until he started antibiotics\"    Pt declines appt, asking for same Rx \"or something stronger\"    Nose v

## 2019-04-25 NOTE — TELEPHONE ENCOUNTER
Patient states she saw Dr Theo Liu on 3.26.19 for bronchitis. She was prescribed antibiotics. She states it helped, but the symptoms have returned. She isn't sure if she needs more antibiotics? Transferred to triage voicemail for further details.     Tete

## 2019-04-26 ENCOUNTER — OFFICE VISIT (OUTPATIENT)
Dept: FAMILY MEDICINE CLINIC | Facility: CLINIC | Age: 29
End: 2019-04-26
Payer: COMMERCIAL

## 2019-04-26 VITALS
HEIGHT: 59 IN | TEMPERATURE: 99 F | OXYGEN SATURATION: 98 % | SYSTOLIC BLOOD PRESSURE: 104 MMHG | BODY MASS INDEX: 29.03 KG/M2 | WEIGHT: 144 LBS | DIASTOLIC BLOOD PRESSURE: 64 MMHG | RESPIRATION RATE: 16 BRPM | HEART RATE: 84 BPM

## 2019-04-26 DIAGNOSIS — J20.9 BRONCHITIS WITH BRONCHOSPASM: Primary | ICD-10-CM

## 2019-04-26 PROCEDURE — 99214 OFFICE O/P EST MOD 30 MIN: CPT | Performed by: FAMILY MEDICINE

## 2019-04-26 RX ORDER — DOXYCYCLINE HYCLATE 100 MG
100 TABLET ORAL 2 TIMES DAILY
Qty: 14 TABLET | Refills: 0 | Status: SHIPPED | OUTPATIENT
Start: 2019-04-26 | End: 2019-05-03

## 2019-04-26 RX ORDER — CEFUROXIME AXETIL 500 MG/1
500 TABLET ORAL 2 TIMES DAILY
Qty: 14 TABLET | Refills: 0 | Status: SHIPPED | OUTPATIENT
Start: 2019-04-26 | End: 2019-04-26

## 2019-04-26 RX ORDER — CODEINE PHOSPHATE AND GUAIFENESIN 10; 100 MG/5ML; MG/5ML
SOLUTION ORAL
Qty: 118 ML | Refills: 0 | Status: SHIPPED | OUTPATIENT
Start: 2019-04-26 | End: 2019-08-05 | Stop reason: ALTCHOICE

## 2019-04-26 RX ORDER — BENZONATATE 100 MG/1
100 CAPSULE ORAL 3 TIMES DAILY PRN
Qty: 30 CAPSULE | Refills: 0 | Status: SHIPPED | OUTPATIENT
Start: 2019-04-26 | End: 2019-08-05 | Stop reason: ALTCHOICE

## 2019-04-26 NOTE — PROGRESS NOTES
HPI:   Urvashi Amador is a 34year old female who presents for upper respiratory symptoms for  1  weeks.  Patient reports sore throat, congestion, low grade fever, dry cough, cough with yellowish colored sputum, ear pain, OTC cold meds have not been helping water gargles, tea with honey/lemon, antihistamine, delsym prn and tylenol/motrin prn  - will tx with doxycycline x 7d, cheratussin po qhs prn cough and tessalon perles po tid prn cough, reviewed indications, dosing and SEs  - patient indicates understandi

## 2019-06-23 ENCOUNTER — PATIENT MESSAGE (OUTPATIENT)
Dept: FAMILY MEDICINE CLINIC | Facility: CLINIC | Age: 29
End: 2019-06-23

## 2019-06-24 NOTE — TELEPHONE ENCOUNTER
Appt scheduled for today    Future Appointments   Date Time Provider Franike Kumar   6/24/2019  3:15 PM Rashel Guy,  [facility]

## 2019-06-24 NOTE — TELEPHONE ENCOUNTER
Patient called to schedule an appointment. She wanted today, but there are no openings. Offered tomorrow, but she says she is working. She asked me to let Dr Yusuf Henderson know that it is urgent that she be seen today.   She wants to know if we can squeeze her

## 2019-06-24 NOTE — TELEPHONE ENCOUNTER
From: Valencia Nunez  To: Justin Wilder DO  Sent: 6/23/2019 6:28 AM CDT  Subject: Prescription Question    Hi Dr Nikki Johansen you are doing well. I was wondering if you could send me an antibiotic prescription for UTI. Let me know.      Thanks,

## 2019-08-05 ENCOUNTER — OFFICE VISIT (OUTPATIENT)
Dept: FAMILY MEDICINE CLINIC | Facility: CLINIC | Age: 29
End: 2019-08-05
Payer: COMMERCIAL

## 2019-08-05 VITALS
BODY MASS INDEX: 25.68 KG/M2 | RESPIRATION RATE: 16 BRPM | SYSTOLIC BLOOD PRESSURE: 100 MMHG | WEIGHT: 127.38 LBS | DIASTOLIC BLOOD PRESSURE: 56 MMHG | OXYGEN SATURATION: 99 % | HEIGHT: 59 IN | HEART RATE: 79 BPM | TEMPERATURE: 98 F

## 2019-08-05 DIAGNOSIS — T74.11XA PHYSICAL ABUSE OF ADULT BY PARTNER, INITIAL ENCOUNTER: ICD-10-CM
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
## 2019-08-05 DIAGNOSIS — R45.89 SYMPTOMS OF DEPRESSION: ICD-10-CM

## 2019-08-05 DIAGNOSIS — Z63.0 MARITAL CONFLICT: Primary | ICD-10-CM

## 2019-08-05 PROCEDURE — 99213 OFFICE O/P EST LOW 20 MIN: CPT | Performed by: FAMILY MEDICINE

## 2019-08-05 SDOH — SOCIAL STABILITY - SOCIAL INSECURITY: PROBLEMS IN RELATIONSHIP WITH SPOUSE OR PARTNER: Z63.0
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
## 2019-10-07 ENCOUNTER — TELEPHONE (OUTPATIENT)
Dept: FAMILY MEDICINE CLINIC | Facility: CLINIC | Age: 29
End: 2019-10-07

## 2019-10-07 NOTE — TELEPHONE ENCOUNTER
Patient came in and asked for her medical records just the ov  Notes , sending to medical records to be done , she is asking for it to be done by the 20 th ???

## 2020-01-29 PROBLEM — L68.0 HIRSUTISM: Status: ACTIVE | Noted: 2020-01-29

## 2020-03-17 ENCOUNTER — OFFICE VISIT (OUTPATIENT)
Dept: FAMILY MEDICINE CLINIC | Facility: CLINIC | Age: 30
End: 2020-03-17
Payer: COMMERCIAL

## 2020-03-17 VITALS
DIASTOLIC BLOOD PRESSURE: 66 MMHG | SYSTOLIC BLOOD PRESSURE: 106 MMHG | HEART RATE: 76 BPM | WEIGHT: 129.38 LBS | HEIGHT: 58.66 IN | TEMPERATURE: 99 F | RESPIRATION RATE: 16 BRPM | OXYGEN SATURATION: 98 % | BODY MASS INDEX: 26.44 KG/M2

## 2020-03-17 DIAGNOSIS — J20.9 BRONCHITIS WITH BRONCHOSPASM: ICD-10-CM

## 2020-03-17 DIAGNOSIS — Z00.00 LABORATORY EXAM ORDERED AS PART OF ROUTINE GENERAL MEDICAL EXAMINATION: ICD-10-CM

## 2020-03-17 DIAGNOSIS — J30.9 ALLERGIC RHINITIS, UNSPECIFIED SEASONALITY, UNSPECIFIED TRIGGER: ICD-10-CM

## 2020-03-17 DIAGNOSIS — R05.9 COUGH: ICD-10-CM

## 2020-03-17 DIAGNOSIS — Z01.419 WELL WOMAN EXAM WITH ROUTINE GYNECOLOGICAL EXAM: Primary | ICD-10-CM

## 2020-03-17 DIAGNOSIS — Z30.432 ENCOUNTER FOR REMOVAL OF INTRAUTERINE CONTRACEPTIVE DEVICE: ICD-10-CM

## 2020-03-17 DIAGNOSIS — B35.1 ONYCHOMYCOSIS OF RIGHT GREAT TOE: ICD-10-CM

## 2020-03-17 PROCEDURE — 99214 OFFICE O/P EST MOD 30 MIN: CPT | Performed by: FAMILY MEDICINE

## 2020-03-17 PROCEDURE — 99395 PREV VISIT EST AGE 18-39: CPT | Performed by: FAMILY MEDICINE

## 2020-03-17 PROCEDURE — 58301 REMOVE INTRAUTERINE DEVICE: CPT | Performed by: FAMILY MEDICINE

## 2020-03-17 PROCEDURE — 87624 HPV HI-RISK TYP POOLED RSLT: CPT | Performed by: FAMILY MEDICINE

## 2020-03-17 PROCEDURE — 87625 HPV TYPES 16 & 18 ONLY: CPT | Performed by: FAMILY MEDICINE

## 2020-03-17 RX ORDER — BENZONATATE 200 MG/1
200 CAPSULE ORAL 3 TIMES DAILY PRN
Qty: 30 CAPSULE | Refills: 0 | Status: SHIPPED | OUTPATIENT
Start: 2020-03-17 | End: 2020-07-02

## 2020-03-17 RX ORDER — CEFUROXIME AXETIL 500 MG/1
500 TABLET ORAL 2 TIMES DAILY
Qty: 14 TABLET | Refills: 0 | Status: SHIPPED | OUTPATIENT
Start: 2020-03-17 | End: 2020-03-24

## 2020-03-17 NOTE — PATIENT INSTRUCTIONS
Breast Health: Breast Self-Awareness  What is breast self-awareness? Breast self-awareness is knowing how your breasts normally look and feel. Your breasts change as you go through different stages of your life.  So it’s important to learn what is normal It’s okay to be upset if you find a lump. Be sure to call your provider right away. Remember that most breast lumps are benign. This means they are not cancer. Date Last Reviewed: 8/1/2017  © 1089-5083 [employer] [address].  Jacinto Neal [address] Des[address] Chlamydia Sexually active women ages 22 and younger, and women at increased risk for infection (such as having multiple sex partners) Every year if you're at risk or have symptoms   Depression All women in this age group At routine exams   Type 2 diabetes, Hepatitis B Women at increased risk for infection should talk with their healthcare provider 3 doses over 6 months; second dose should be given 1 month after the first dose; the third dose should be given at least 2 months after the second dose and at leas Sexually transmitted infection prevention Women who are sexually active At routine exams   Skin cancer Prevention of skin cancer in fair-skinned adults At routine exams   Use of tobacco and the health effects it can cause All women in this age group Every

## 2020-03-17 NOTE — PROGRESS NOTES
Patient presents with:  Physical: cough   IUD: removal       HPI:  35yr old female presents for her WWE, IUD removal and c/o URI symptoms. Last pap done 3yrs ago. Menses have been regular, due any day now.   Requesting removal of her IUD, has finalized a d Smokeless tobacco: Never Used    Alcohol use: No      Comment: 1-2 beers a week    Drug use: No      Current Outpatient Medications   Medication Sig Dispense Refill   • Cefuroxime Axetil 500 MG Oral Tab Take 1 tablet (500 mg total) by mouth 2 (two) time Lymphadenopathy: No cervical adenopathy. Neurological: Normal reflexes. No cranial nerve deficit or sensory deficit. Normal muscle tone. Coordination normal.   Skin: Skin is warm and dry. No rash noted.  No erythema   Psychiatric: Normal mood and affect exam  (primary encounter diagnosis)  Laboratory exam ordered as part of routine general medical examination  Encounter for removal of intrauterine contraceptive device  Bronchitis with bronchospasm  Cough  Allergic rhinitis, unspecified seasonality, unspec

## 2020-03-20 ENCOUNTER — LAB ENCOUNTER (OUTPATIENT)
Dept: LAB | Age: 30
End: 2020-03-20
Attending: FAMILY MEDICINE
Payer: COMMERCIAL

## 2020-03-20 DIAGNOSIS — Z00.00 LABORATORY EXAM ORDERED AS PART OF ROUTINE GENERAL MEDICAL EXAMINATION: ICD-10-CM

## 2020-03-20 LAB
ALBUMIN SERPL-MCNC: 3.9 G/DL (ref 3.4–5)
ALBUMIN/GLOB SERPL: 0.9 {RATIO} (ref 1–2)
ALP LIVER SERPL-CCNC: 76 U/L (ref 37–98)
ALT SERPL-CCNC: 21 U/L (ref 13–56)
ANION GAP SERPL CALC-SCNC: 3 MMOL/L (ref 0–18)
AST SERPL-CCNC: 10 U/L (ref 15–37)
BASOPHILS # BLD AUTO: 0.12 X10(3) UL (ref 0–0.2)
BASOPHILS NFR BLD AUTO: 1.3 %
BILIRUB SERPL-MCNC: 0.9 MG/DL (ref 0.1–2)
BUN BLD-MCNC: 7 MG/DL (ref 7–18)
BUN/CREAT SERPL: 11.1 (ref 10–20)
CALCIUM BLD-MCNC: 8.8 MG/DL (ref 8.5–10.1)
CHLORIDE SERPL-SCNC: 106 MMOL/L (ref 98–112)
CHOLEST SMN-MCNC: 121 MG/DL (ref ?–200)
CO2 SERPL-SCNC: 28 MMOL/L (ref 21–32)
CREAT BLD-MCNC: 0.63 MG/DL (ref 0.55–1.02)
DEPRECATED RDW RBC AUTO: 43.2 FL (ref 35.1–46.3)
EOSINOPHIL # BLD AUTO: 0.25 X10(3) UL (ref 0–0.7)
EOSINOPHIL NFR BLD AUTO: 2.7 %
ERYTHROCYTE [DISTWIDTH] IN BLOOD BY AUTOMATED COUNT: 12.9 % (ref 11–15)
GLOBULIN PLAS-MCNC: 4.2 G/DL (ref 2.8–4.4)
GLUCOSE BLD-MCNC: 95 MG/DL (ref 70–99)
HCT VFR BLD AUTO: 42.4 % (ref 35–48)
HDLC SERPL-MCNC: 45 MG/DL (ref 40–59)
HGB BLD-MCNC: 13.4 G/DL (ref 12–16)
HPV I/H RISK 1 DNA SPEC QL NAA+PROBE: POSITIVE
IMM GRANULOCYTES # BLD AUTO: 0.04 X10(3) UL (ref 0–1)
IMM GRANULOCYTES NFR BLD: 0.4 %
LDLC SERPL CALC-MCNC: 59 MG/DL (ref ?–100)
LYMPHOCYTES # BLD AUTO: 2.52 X10(3) UL (ref 1–4)
LYMPHOCYTES NFR BLD AUTO: 27.6 %
M PROTEIN MFR SERPL ELPH: 8.1 G/DL (ref 6.4–8.2)
MCH RBC QN AUTO: 29.1 PG (ref 26–34)
MCHC RBC AUTO-ENTMCNC: 31.6 G/DL (ref 31–37)
MCV RBC AUTO: 92 FL (ref 80–100)
MONOCYTES # BLD AUTO: 0.68 X10(3) UL (ref 0.1–1)
MONOCYTES NFR BLD AUTO: 7.4 %
NEUTROPHILS # BLD AUTO: 5.53 X10 (3) UL (ref 1.5–7.7)
NEUTROPHILS # BLD AUTO: 5.53 X10(3) UL (ref 1.5–7.7)
NEUTROPHILS NFR BLD AUTO: 60.6 %
NONHDLC SERPL-MCNC: 76 MG/DL (ref ?–130)
OSMOLALITY SERPL CALC.SUM OF ELEC: 282 MOSM/KG (ref 275–295)
PATIENT FASTING Y/N/NP: YES
PATIENT FASTING Y/N/NP: YES
PLATELET # BLD AUTO: 243 10(3)UL (ref 150–450)
POTASSIUM SERPL-SCNC: 4.2 MMOL/L (ref 3.5–5.1)
RBC # BLD AUTO: 4.61 X10(6)UL (ref 3.8–5.3)
SODIUM SERPL-SCNC: 137 MMOL/L (ref 136–145)
TRIGL SERPL-MCNC: 83 MG/DL (ref 30–149)
TSI SER-ACNC: 1.9 MIU/ML (ref 0.36–3.74)
VLDLC SERPL CALC-MCNC: 17 MG/DL (ref 0–30)
WBC # BLD AUTO: 9.1 X10(3) UL (ref 4–11)

## 2020-03-20 PROCEDURE — 80061 LIPID PANEL: CPT | Performed by: FAMILY MEDICINE

## 2020-03-20 PROCEDURE — 36415 COLL VENOUS BLD VENIPUNCTURE: CPT | Performed by: FAMILY MEDICINE

## 2020-03-20 PROCEDURE — 80050 GENERAL HEALTH PANEL: CPT | Performed by: FAMILY MEDICINE

## 2020-03-22 LAB
HPV16 DNA CVX QL PROBE+SIG AMP: NEGATIVE
HPV18 DNA CVX QL PROBE+SIG AMP: NEGATIVE

## 2020-03-25 NOTE — PROGRESS NOTES
Tried calling pt again about test results but phone kept ringing. No answer. Abnormal results of pap, pap is normal but positive HPV. Will need to repeat in 1yr.

## 2020-06-29 ENCOUNTER — PATIENT MESSAGE (OUTPATIENT)
Dept: FAMILY MEDICINE CLINIC | Facility: CLINIC | Age: 30
End: 2020-06-29

## 2020-06-29 NOTE — TELEPHONE ENCOUNTER
From: [facility]  To: Edouard Morales DO  Sent: 6/29/2020 4:44 AM CDT  Subject: Test Results Question    Hi Dr Ebenezer Hernandez,    I hope you are well.  I am getting my facial hairs removed and the person who is doing the procedure highly recommended that I shoul

## 2020-07-02 ENCOUNTER — OFFICE VISIT (OUTPATIENT)
Dept: FAMILY MEDICINE CLINIC | Facility: CLINIC | Age: 30
End: 2020-07-02
Payer: COMMERCIAL

## 2020-07-02 VITALS
HEIGHT: 58.66 IN | HEART RATE: 82 BPM | WEIGHT: 133.13 LBS | SYSTOLIC BLOOD PRESSURE: 104 MMHG | OXYGEN SATURATION: 99 % | TEMPERATURE: 98 F | DIASTOLIC BLOOD PRESSURE: 72 MMHG | RESPIRATION RATE: 16 BRPM | BODY MASS INDEX: 27.2 KG/M2

## 2020-07-02 DIAGNOSIS — N92.6 IRREGULAR MENSES: Primary | ICD-10-CM

## 2020-07-02 DIAGNOSIS — L68.0 HIRSUTISM: ICD-10-CM

## 2020-07-02 PROCEDURE — 99213 OFFICE O/P EST LOW 20 MIN: CPT | Performed by: FAMILY MEDICINE

## 2020-07-02 NOTE — PATIENT INSTRUCTIONS
Understanding Uterine Bleeding  Your uterine bleeding may be heavy. Or you may have bleeding between periods. These problems may be caused by hormonal imbalance.  Or they can be caused by uterine growths, an intrauterine device (IUD), bleeding disorder, o © 7544-1253 [employer]. [address] [facility] [place], [facility] [place]. All rights reserved. This information is not intended as a substitute for professional medical care. Always follow your healthcare professional's instructions.

## 2020-07-02 NOTE — PROGRESS NOTES
HPI:    Patient ID: Bozena Grant is a 27year old female. HPI   35yr old  female presents with c/o hirsutism and irregular menses.  States she has started laser hair removal and was told by the  to check her hormones and for ovarian cys

## 2020-07-08 ENCOUNTER — OFFICE VISIT (OUTPATIENT)
Dept: PODIATRY CLINIC | Facility: CLINIC | Age: 30
End: 2020-07-08
Payer: COMMERCIAL

## 2020-07-08 DIAGNOSIS — B35.1 DERMATOPHYTOSIS OF NAIL: Primary | ICD-10-CM

## 2020-07-08 PROCEDURE — 99203 OFFICE O/P NEW LOW 30 MIN: CPT | Performed by: PODIATRIST

## 2020-07-12 NOTE — PROGRESS NOTES
Erasmo Wagner is a 27year old female. Patient presents with:  New Patient: right hallux nail has been infected for a long time -has tried topical lamisil - pain scale at worst 10/10.         HPI:   This pleasant patient presents to the clinic with a chief exertion  CARDIOVASCULAR: denies chest pain on exertion  GI: denies abdominal pain and denies heartburn  NEURO: denies headaches    EXAM:   LMP 06/23/2020   GENERAL: well developed, well nourished, in no apparent distress  EXTREMITIES:   1. Integument:  The

## 2020-07-16 ENCOUNTER — TELEPHONE (OUTPATIENT)
Dept: PODIATRY CLINIC | Facility: CLINIC | Age: 30
End: 2020-07-16

## 2020-07-16 DIAGNOSIS — B35.1 ONYCHOMYCOSIS: Primary | ICD-10-CM

## 2020-07-16 NOTE — TELEPHONE ENCOUNTER
Spoke with patient. Verified name and .  Informed patient per Dr. Ivy Damon of positive fungal culture and per Dr. Ivy Damon would like to treat with Lamisil, before treatment is ordered will need to do Hepatic function test. Patient wishes to proceed, hepat

## 2020-07-16 NOTE — TELEPHONE ENCOUNTER
----- Message from Prachi Willams DPM sent at 7/12/2020  4:44 PM CDT -----  Results reviewed. Please inform patient that fungal culture results are positive and we should proceed with Lamisil tablet therapy.   If the patient agrees we have to do a hepa

## 2020-07-22 ENCOUNTER — TELEPHONE (OUTPATIENT)
Dept: FAMILY MEDICINE CLINIC | Facility: CLINIC | Age: 30
End: 2020-07-22

## 2020-07-22 ENCOUNTER — MED REC SCAN ONLY (OUTPATIENT)
Dept: FAMILY MEDICINE CLINIC | Facility: CLINIC | Age: 30
End: 2020-07-22

## 2020-07-22 ENCOUNTER — OFFICE VISIT (OUTPATIENT)
Dept: FAMILY MEDICINE CLINIC | Facility: CLINIC | Age: 30
End: 2020-07-22
Payer: COMMERCIAL

## 2020-07-22 DIAGNOSIS — L68.0 HIRSUTISM: ICD-10-CM

## 2020-07-22 DIAGNOSIS — N92.6 IRREGULAR MENSES: ICD-10-CM

## 2020-07-22 DIAGNOSIS — Z30.011 ENCOUNTER FOR INITIAL PRESCRIPTION OF CONTRACEPTIVE PILLS: ICD-10-CM

## 2020-07-22 DIAGNOSIS — E28.2 PCOS (POLYCYSTIC OVARIAN SYNDROME): Primary | ICD-10-CM

## 2020-07-22 LAB
CONTROL LINE PRESENT WITH A CLEAR BACKGROUND (YES/NO): YES YES/NO
PREGNANCY TEST, URINE: NEGATIVE

## 2020-07-22 PROCEDURE — 81025 URINE PREGNANCY TEST: CPT | Performed by: FAMILY MEDICINE

## 2020-07-22 PROCEDURE — 99214 OFFICE O/P EST MOD 30 MIN: CPT | Performed by: FAMILY MEDICINE

## 2020-07-22 RX ORDER — DESOGESTREL AND ETHINYL ESTRADIOL 0.15-0.03
1 KIT ORAL DAILY
Qty: 3 PACKAGE | Refills: 0 | Status: SHIPPED | OUTPATIENT
Start: 2020-07-22 | End: 2020-10-23 | Stop reason: ALTCHOICE

## 2020-07-22 NOTE — PROGRESS NOTES
HPI:    Patient ID: Anton Shukla is a 27year old female. HPI   30yr old female presents for f/u on recent pelvic US results. Had [address] done to evaluate for ovarian cysts. Menarche at age 15.  Menses were regular from age 15 to 21 and then became irregu ovaries as can be seen with PCOS clinical setting.  2. Normal-appearing uterus and endometrium  - given symptoms of irregular menses, hirsutism and US results, pt does meet criteria for PCOS  - discussed dx and tx options of PCOS in detail with pt   - will

## 2020-09-08 ENCOUNTER — LAB ENCOUNTER (OUTPATIENT)
Dept: LAB | Age: 30
End: 2020-09-08
Attending: PODIATRIST
Payer: COMMERCIAL

## 2020-09-08 DIAGNOSIS — B35.1 ONYCHOMYCOSIS: ICD-10-CM

## 2020-09-08 LAB
ALBUMIN SERPL-MCNC: 3.6 G/DL (ref 3.4–5)
ALP LIVER SERPL-CCNC: 65 U/L (ref 37–98)
ALT SERPL-CCNC: 23 U/L (ref 13–56)
AST SERPL-CCNC: 13 U/L (ref 15–37)
BILIRUB DIRECT SERPL-MCNC: 0.2 MG/DL (ref 0–0.2)
BILIRUB SERPL-MCNC: 0.8 MG/DL (ref 0.1–2)
M PROTEIN MFR SERPL ELPH: 7.7 G/DL (ref 6.4–8.2)

## 2020-09-08 PROCEDURE — 80076 HEPATIC FUNCTION PANEL: CPT

## 2020-10-23 ENCOUNTER — TELEMEDICINE (OUTPATIENT)
Dept: FAMILY MEDICINE CLINIC | Facility: CLINIC | Age: 30
End: 2020-10-23

## 2020-10-23 ENCOUNTER — OFFICE VISIT (OUTPATIENT)
Dept: PODIATRY CLINIC | Facility: CLINIC | Age: 30
End: 2020-10-23
Payer: COMMERCIAL

## 2020-10-23 VITALS — RESPIRATION RATE: 16 BRPM

## 2020-10-23 DIAGNOSIS — B35.1 DERMATOPHYTOSIS OF NAIL: ICD-10-CM

## 2020-10-23 DIAGNOSIS — N92.6 IRREGULAR MENSES: ICD-10-CM

## 2020-10-23 DIAGNOSIS — B35.1 ONYCHOMYCOSIS: Primary | ICD-10-CM

## 2020-10-23 DIAGNOSIS — L68.0 HIRSUTISM: ICD-10-CM

## 2020-10-23 DIAGNOSIS — E28.2 PCOS (POLYCYSTIC OVARIAN SYNDROME): Primary | ICD-10-CM

## 2020-10-23 PROBLEM — Z34.90 PREGNANCY (HCC): Status: RESOLVED | Noted: 2018-08-20 | Resolved: 2020-10-23

## 2020-10-23 PROBLEM — Z34.90 PREGNANCY: Status: RESOLVED | Noted: 2018-08-20 | Resolved: 2020-10-23

## 2020-10-23 PROCEDURE — 99214 OFFICE O/P EST MOD 30 MIN: CPT | Performed by: FAMILY MEDICINE

## 2020-10-23 PROCEDURE — 99213 OFFICE O/P EST LOW 20 MIN: CPT | Performed by: PODIATRIST

## 2020-10-23 NOTE — PROGRESS NOTES
Video visit  Please note that the following visit was completed using two-way, real-time interactive audio and video communication.   This has been done in good [place] to provide continuity of care in the best interest of the provider-patient relationship, d person, place, and time. She appears well-developed and well-nourished. HENT:   Head: Normocephalic and atraumatic. Pulmonary/Chest: Effort normal.   Neurological: She is alert and oriented to person, place, and time.    Psychiatric: She has a normal mo

## 2020-10-26 NOTE — PROGRESS NOTES
Giancarlo Santiago is a 27year old female. Patient presents with:  Toenail Fungus        HPI:   Patient returns to clinic for checkup on her toenail she is now finished there is almost finished 6 weeks of Lamisil tablet therapy.   At today's visit reviewed [facility] REVIEW OF SYSTEMS:   Today reviewed systens as documented below  GENERAL HEALTH: feels well otherwise  SKIN: Refer to exam below  RESPIRATORY: denies shortness of breath with exertion  CARDIOVASCULAR: denies chest pain on exertion  GI: denies abdominal

## 2020-11-05 ENCOUNTER — LAB ENCOUNTER (OUTPATIENT)
Dept: LAB | Facility: HOSPITAL | Age: 30
End: 2020-11-05
Attending: PODIATRIST
Payer: COMMERCIAL

## 2020-11-05 DIAGNOSIS — B35.1 ONYCHOMYCOSIS: ICD-10-CM

## 2020-11-05 DIAGNOSIS — B35.1 DERMATOPHYTOSIS OF NAIL: ICD-10-CM

## 2020-11-05 PROCEDURE — 80076 HEPATIC FUNCTION PANEL: CPT

## 2020-11-05 PROCEDURE — 36415 COLL VENOUS BLD VENIPUNCTURE: CPT

## 2020-11-11 ENCOUNTER — PATIENT MESSAGE (OUTPATIENT)
Dept: PODIATRY CLINIC | Facility: CLINIC | Age: 30
End: 2020-11-11

## 2020-11-11 NOTE — TELEPHONE ENCOUNTER
S/w pt and she states she has been taking the lamisil for 6 wks, but the past 3 wks she developed itching all over her body, intermittently. She denies any hives, SOB, swelling, tightness in her throat, chest pain.  She denies any new soaps, detergents or f

## 2020-11-11 NOTE — TELEPHONE ENCOUNTER
From: Madeline Canales  To: Ezequiel Tan DPM  Sent: 11/11/2020 12:21 PM CST  Subject: Test Results Question    Dr Humera Prescott,     I haven't heard back from your office regarding my test results and if you send prescription for refill?  Also, is it possible

## 2020-11-11 NOTE — TELEPHONE ENCOUNTER
Claudia Coyle, [place]   11/11/2020  4:04 PM      Hepatic function panel is normal please call in the following prescription:   Lamisil 250 mg tablets  Dispense 45, no refill  Instructions take 1 tablet daily p.o.   Then please tell the patient to follow-u

## 2020-11-12 NOTE — TELEPHONE ENCOUNTER
Please have the patient stop taking the Lamisil tablets immediately and tell her to use Benadryl for the itching and if it does not go away then she should call us back thank you

## 2020-12-15 ENCOUNTER — TELEPHONE (OUTPATIENT)
Dept: PODIATRY CLINIC | Facility: CLINIC | Age: 30
End: 2020-12-15

## 2020-12-15 NOTE — TELEPHONE ENCOUNTER
Per pt asking to have lamisil prescribed again, states her last allergic reaction was not due to lamisil, states she is having foot pain. Please call thank you.

## 2020-12-15 NOTE — TELEPHONE ENCOUNTER
I am not too worried about a short-term gap but I do not want to take the chance of causing anaphylaxis. We should arrange consult with a allergist immunologist so that she can be tested for allergy to Lamisil tablets specifically.   After she completes th

## 2020-12-15 NOTE — TELEPHONE ENCOUNTER
S/w pt and informed her per Dr. Zakia Taylor message. She states she is not allergic to the lamisil and the symptoms she was having started prior to her taking the lamisil.  She was using a collagen supplement in her food and she believes this was causing the it

## 2020-12-15 NOTE — TELEPHONE ENCOUNTER
S/w pt who was tx with lamisil on 9/14/20 and completed #45 then she had new hepatic function on 11/5 that was normal. She sent message to office on 11/11 that she developed itching all of her body and Dr. Stan Stanford told her to stop lamisil and take benadryl

## 2020-12-18 NOTE — TELEPHONE ENCOUNTER
I know that the patient thinks that she can take the medication however I feel uncomfortable prescribing that when she had a possible allergic reaction and before I will prescribe it she needs to see an allergist.

## 2020-12-21 NOTE — TELEPHONE ENCOUNTER
Pt notified per Dr. Robbins Lacks message. Offered to give her a name of an allergist. She would like it sent through [employer].

## 2021-01-18 NOTE — TELEPHONE ENCOUNTER
LOV 7/22/2020    LAST LAB    LAST RX 10-23-20 60*2    Next OV No future appointments.     PROTOCOL    Name from pharmacy: metFORMIN HCl Oral Tablet 500 MG          Will file in chart as: METFORMIN  MG Oral Tab    Sig: TAKE 1 TABLET BY MOUTH TWO TIMES

## 2021-02-26 ENCOUNTER — TELEPHONE (OUTPATIENT)
Dept: FAMILY MEDICINE CLINIC | Facility: CLINIC | Age: 31
End: 2021-02-26

## 2021-02-26 NOTE — TELEPHONE ENCOUNTER
Pt called - scheduled her yearly physical for 3-19-21. She asked asked for Dr to place orders for allergy testing.   But also wants to know if she should go directly to an allergist.

## 2021-04-08 ENCOUNTER — OFFICE VISIT (OUTPATIENT)
Dept: FAMILY MEDICINE CLINIC | Facility: CLINIC | Age: 31
End: 2021-04-08
Payer: COMMERCIAL

## 2021-04-08 VITALS
HEIGHT: 58.66 IN | HEART RATE: 68 BPM | WEIGHT: 140.19 LBS | DIASTOLIC BLOOD PRESSURE: 62 MMHG | OXYGEN SATURATION: 97 % | RESPIRATION RATE: 16 BRPM | BODY MASS INDEX: 28.64 KG/M2 | TEMPERATURE: 97 F | SYSTOLIC BLOOD PRESSURE: 104 MMHG

## 2021-04-08 DIAGNOSIS — B35.1 ONYCHOMYCOSIS OF RIGHT GREAT TOE: ICD-10-CM

## 2021-04-08 DIAGNOSIS — L68.0 HIRSUTISM: ICD-10-CM

## 2021-04-08 DIAGNOSIS — Z00.00 LABORATORY EXAM ORDERED AS PART OF ROUTINE GENERAL MEDICAL EXAMINATION: ICD-10-CM

## 2021-04-08 DIAGNOSIS — Z01.419 WELL WOMAN EXAM WITH ROUTINE GYNECOLOGICAL EXAM: Primary | ICD-10-CM

## 2021-04-08 DIAGNOSIS — E28.2 PCOS (POLYCYSTIC OVARIAN SYNDROME): ICD-10-CM

## 2021-04-08 PROCEDURE — 3074F SYST BP LT 130 MM HG: CPT | Performed by: FAMILY MEDICINE

## 2021-04-08 PROCEDURE — 3008F BODY MASS INDEX DOCD: CPT | Performed by: FAMILY MEDICINE

## 2021-04-08 PROCEDURE — 3078F DIAST BP <80 MM HG: CPT | Performed by: FAMILY MEDICINE

## 2021-04-08 PROCEDURE — 99395 PREV VISIT EST AGE 18-39: CPT | Performed by: FAMILY MEDICINE

## 2021-04-08 PROCEDURE — 87624 HPV HI-RISK TYP POOLED RSLT: CPT | Performed by: FAMILY MEDICINE

## 2021-04-08 NOTE — PROGRESS NOTES
Patient presents with:  Physical: wwe. toe nail removal .      HPI:  26yr old female with PCOS presents for her WWE. Doing well overall. PCOS, menses have been regular per pt, getting them q30-34d. Has stopped metformin and notes feeling better.  Hirsutism Medications   Medication Sig Dispense Refill   • B Complex-C-Folic Acid Oral Tab Take by mouth. (Patient not taking: Reported on 4/8/2021 )     • VITAMIN D3 25 MCG (1000 UT) Oral Tab Take 1 tablet (1,000 Units total) by mouth daily.  (Patient not taking: Re Immunizations:   Immunization History  Administered            Date(s) Administered    DTP                   04/13/2011      HEP B                 03/30/2010 05/26/2010 08/24/2010      IPV                   03/30/2010 05/26/2010      Influenza and agrees with tx plan  - RTC in 1yr for next physical, sooner if needed    Orders Placed This Encounter      CBC With Diff      CMP      Lipid      TSH W Reflex To Free T4      ThinPrep PAP with HPV Reflex Request      Meds & Refills for this Visit:  Yared León

## 2021-04-08 NOTE — PATIENT INSTRUCTIONS
Breast Health: Breast Self-Awareness  What is breast self-awareness? Breast self-awareness is knowing how your breasts normally look and feel. Your breasts change as you go through different stages of your life.  So it’s important to learn what is normal right away. Remember that most breast lumps are benign. This means they are not cancer. Lisa last reviewed this educational content on 5/1/2020  © 0344-8904 [employer] [address]. All rights reserved.  This information is not intended as a substit this age group  At routine exams   Diabetes mellitus, type 2  Adults with no symptoms who are overweight or obese and have 1 or more other risk factors for diabetes  At least every 3 years.  Also, testing for diabetes during pregnancy after the 24th week.  infections or vaccines  1 or 2 doses   Meningococcal Women at increased risk for infection should talk with their healthcare provider  1 or more doses   Pneumococcal conjugate vaccine (PCV13) and pneumococcal polysaccharide vaccine (PPSV23)  Women at incre years be screened for HIV and those younger or older people at increased risk. The CDC recommends that everyone between the ages of 15 and 59 get tested for HIV at least once as part of routine health care.    Lisa last reviewed this educational content

## 2021-04-21 ENCOUNTER — LAB ENCOUNTER (OUTPATIENT)
Dept: LAB | Age: 31
End: 2021-04-21
Attending: FAMILY MEDICINE
Payer: COMMERCIAL

## 2021-04-21 DIAGNOSIS — Z00.00 LABORATORY EXAM ORDERED AS PART OF ROUTINE GENERAL MEDICAL EXAMINATION: ICD-10-CM

## 2021-04-21 PROCEDURE — 80061 LIPID PANEL: CPT | Performed by: FAMILY MEDICINE

## 2021-04-21 PROCEDURE — 80050 GENERAL HEALTH PANEL: CPT | Performed by: FAMILY MEDICINE

## 2021-05-04 ENCOUNTER — OFFICE VISIT (OUTPATIENT)
Dept: ORTHOPEDICS CLINIC | Facility: CLINIC | Age: 31
End: 2021-05-04
Payer: COMMERCIAL

## 2021-05-04 DIAGNOSIS — M79.674 PAIN OF TOE OF RIGHT FOOT: ICD-10-CM

## 2021-05-04 DIAGNOSIS — B35.1 ONYCHOMYCOSIS: Primary | ICD-10-CM

## 2021-05-04 PROCEDURE — 99202 OFFICE O/P NEW SF 15 MIN: CPT | Performed by: PODIATRIST

## 2021-05-04 PROCEDURE — 11730 AVULSION NAIL PLATE SIMPLE 1: CPT | Performed by: PODIATRIST

## 2021-05-04 RX ORDER — TERBINAFINE HYDROCHLORIDE 250 MG/1
250 TABLET ORAL DAILY
Qty: 30 TABLET | Refills: 2 | Status: SHIPPED | OUTPATIENT
Start: 2021-05-04 | End: 2021-07-27

## 2021-05-04 RX ORDER — EFINACONAZOLE 100 MG/ML
1 SOLUTION TOPICAL 2 TIMES DAILY
Qty: 1 BOTTLE | Refills: 2 | Status: SHIPPED | OUTPATIENT
Start: 2021-05-04 | End: 2021-11-02

## 2021-05-04 NOTE — PROGRESS NOTES
[facility] New Patient Note    CC: Patient presents with:  Toenail Fungus: Patient is here today for right great toenail that has been giving her issues for years now.        HPI: The patient is a 32year old female who presents today with compl can remove the nail and allow it to regrow but there is a 50% chance that the nail will be exactly the same in 1 year.   We can try to counter that by using Lamisil for that period of time of regrowth and also using something topical, hopefully we can get J

## 2021-05-10 ENCOUNTER — TELEPHONE (OUTPATIENT)
Dept: ORTHOPEDICS CLINIC | Facility: CLINIC | Age: 31
End: 2021-05-10

## 2021-08-10 ENCOUNTER — OFFICE VISIT (OUTPATIENT)
Dept: ORTHOPEDICS CLINIC | Facility: CLINIC | Age: 31
End: 2021-08-10
Payer: COMMERCIAL

## 2021-08-10 VITALS — HEART RATE: 67 BPM | OXYGEN SATURATION: 99 %

## 2021-08-10 DIAGNOSIS — B35.1 ONYCHOMYCOSIS: Primary | ICD-10-CM

## 2021-08-10 PROCEDURE — 99213 OFFICE O/P EST LOW 20 MIN: CPT | Performed by: PODIATRIST

## 2021-08-10 RX ORDER — TERBINAFINE HYDROCHLORIDE 250 MG/1
250 TABLET ORAL DAILY
Qty: 30 TABLET | Refills: 2 | Status: SHIPPED | OUTPATIENT
Start: 2021-08-10 | End: 2021-11-02

## 2021-08-10 NOTE — PROGRESS NOTES
[facility] Progress Note    Subjective:   Patient is here for follow-up of a nail issue. She had been on Lamisil more consistently now and back about 3 months we had removed the nail with a chance that may be a better nail will grow back.   But al

## 2021-10-21 ENCOUNTER — TELEPHONE (OUTPATIENT)
Dept: FAMILY MEDICINE CLINIC | Facility: CLINIC | Age: 31
End: 2021-10-21

## 2021-10-21 NOTE — TELEPHONE ENCOUNTER
Appointment For: Huber Triplettl ([id number])   Visit Type: 5 [facility] [phone])      11/1/2021    1:00 PM  20 mins.  Thu Zapata,  [facility] [address] [place] [address]      Patient Comments:   Have not had my period for the last 3 cycles. I am concerned.

## 2021-10-25 NOTE — TELEPHONE ENCOUNTER
Called patient who states is unable to come into the office on 11/1/21 for appt due to work conflict. Rescheduled appt for Tuesday.     Future Appointments   Date Time Provider Frankie Kumar   11/2/2021 11:40 AM Manjula Burks, DO [facility]

## 2021-10-25 NOTE — TELEPHONE ENCOUNTER
Called patient for appointment reason detail. States she took herself off of OCPs and has not had a period since. Denies any possibility of pregnancy. No abd pain or acute issues. Has noted some wt gain. Wants to discuss going back on OCPs.   States was

## 2021-11-02 ENCOUNTER — OFFICE VISIT (OUTPATIENT)
Dept: FAMILY MEDICINE CLINIC | Facility: CLINIC | Age: 31
End: 2021-11-02
Payer: COMMERCIAL

## 2021-11-02 VITALS
OXYGEN SATURATION: 98 % | SYSTOLIC BLOOD PRESSURE: 116 MMHG | BODY MASS INDEX: 31.95 KG/M2 | DIASTOLIC BLOOD PRESSURE: 70 MMHG | RESPIRATION RATE: 16 BRPM | TEMPERATURE: 98 F | WEIGHT: 156.38 LBS | HEART RATE: 70 BPM | HEIGHT: 58.66 IN

## 2021-11-02 DIAGNOSIS — N92.6 IRREGULAR MENSES: Primary | ICD-10-CM

## 2021-11-02 DIAGNOSIS — N64.4 BREAST PAIN: ICD-10-CM

## 2021-11-02 DIAGNOSIS — R10.2 PELVIC PAIN: ICD-10-CM

## 2021-11-02 DIAGNOSIS — E28.2 PCOS (POLYCYSTIC OVARIAN SYNDROME): ICD-10-CM

## 2021-11-02 DIAGNOSIS — Z80.3 FAMILY HISTORY OF BREAST CANCER: ICD-10-CM

## 2021-11-02 DIAGNOSIS — N63.20 BREAST MASS, LEFT: ICD-10-CM

## 2021-11-02 PROCEDURE — 3008F BODY MASS INDEX DOCD: CPT | Performed by: FAMILY MEDICINE

## 2021-11-02 PROCEDURE — 99214 OFFICE O/P EST MOD 30 MIN: CPT | Performed by: FAMILY MEDICINE

## 2021-11-02 PROCEDURE — 3078F DIAST BP <80 MM HG: CPT | Performed by: FAMILY MEDICINE

## 2021-11-02 PROCEDURE — 3074F SYST BP LT 130 MM HG: CPT | Performed by: FAMILY MEDICINE

## 2021-11-02 PROCEDURE — 81003 URINALYSIS AUTO W/O SCOPE: CPT | Performed by: FAMILY MEDICINE

## 2021-11-02 PROCEDURE — 81025 URINE PREGNANCY TEST: CPT | Performed by: FAMILY MEDICINE

## 2021-11-02 PROCEDURE — 87086 URINE CULTURE/COLONY COUNT: CPT | Performed by: FAMILY MEDICINE

## 2021-11-02 RX ORDER — MEDROXYPROGESTERONE ACETATE 10 MG/1
10 TABLET ORAL DAILY
Qty: 10 TABLET | Refills: 0 | Status: SHIPPED | OUTPATIENT
Start: 2021-11-02 | End: 2021-11-12

## 2021-11-02 RX ORDER — DESOGESTREL AND ETHINYL ESTRADIOL 0.15-0.03
1 KIT ORAL DAILY
Qty: 28 TABLET | Refills: 5 | Status: SHIPPED | OUTPATIENT
Start: 2021-11-02

## 2021-11-02 NOTE — PROGRESS NOTES
Subjective:   Patient ID: Charmaine Sumner is a 32year old female. HPI   31yr old female presents for c/o irregular menses with PCOS dx, pelvic pain, and breast pain. PCOS, had been regular with her menses previously.  However, over the past 6mo or so, h Right: Normal.      Left: Mass (noted at 11 o'clock) and tenderness (at 11 o'clock) present. Abdominal:      General: Bowel sounds are normal.      Palpations: Abdomen is soft. Tenderness: There is abdominal tenderness (mild in L pelvic region). Sunday of next period       Imaging & Referrals:  [facility] ANNA 2D+3D DIAGNOSTIC [facility]  BILAT (CPT=[id number])  US PELVIS W EV (DCI=[id number])

## 2021-11-12 ENCOUNTER — HOSPITAL ENCOUNTER (OUTPATIENT)
Dept: MAMMOGRAPHY | Age: 31
Discharge: HOME | End: 2021-11-12
Attending: FAMILY MEDICINE
Payer: COMMERCIAL

## 2021-11-12 ENCOUNTER — HOSPITAL ENCOUNTER (OUTPATIENT)
Dept: ULTRASOUND IMAGING | Age: 31
Discharge: HOME | End: 2021-11-12
Attending: FAMILY MEDICINE
Payer: COMMERCIAL

## 2021-11-12 DIAGNOSIS — N64.4 BREAST PAIN: ICD-10-CM

## 2021-11-12 DIAGNOSIS — N63.20 BREAST MASS, LEFT: ICD-10-CM

## 2021-11-12 DIAGNOSIS — Z80.3 FAMILY HISTORY OF BREAST CANCER: ICD-10-CM

## 2021-11-12 PROCEDURE — 77066 DX MAMMO INCL CAD BI: CPT | Performed by: FAMILY MEDICINE

## 2021-11-12 PROCEDURE — 77062 BREAST TOMOSYNTHESIS BI: CPT | Performed by: FAMILY MEDICINE

## 2021-11-12 PROCEDURE — 76642 ULTRASOUND BREAST LIMITED: CPT | Performed by: FAMILY MEDICINE

## 2021-12-02 ENCOUNTER — HOSPITAL ENCOUNTER (OUTPATIENT)
Dept: ULTRASOUND IMAGING | Facility: HOSPITAL | Age: 31
Discharge: HOME | End: 2021-12-02
Attending: FAMILY MEDICINE
Payer: COMMERCIAL

## 2021-12-02 ENCOUNTER — HOSPITAL ENCOUNTER (OUTPATIENT)
Dept: ULTRASOUND IMAGING | Age: 31
End: 2021-12-02
Attending: FAMILY MEDICINE
Payer: COMMERCIAL

## 2021-12-02 DIAGNOSIS — R10.2 PELVIC PAIN: ICD-10-CM

## 2021-12-02 PROCEDURE — 76856 US EXAM PELVIC COMPLETE: CPT | Performed by: FAMILY MEDICINE

## 2021-12-02 PROCEDURE — 76830 TRANSVAGINAL US NON-OB: CPT | Performed by: FAMILY MEDICINE

## 2021-12-02 PROCEDURE — 93975 VASCULAR STUDY: CPT | Performed by: FAMILY MEDICINE

## 2021-12-21 ENCOUNTER — OFFICE VISIT (OUTPATIENT)
Dept: ORTHOPEDICS CLINIC | Facility: CLINIC | Age: 31
End: 2021-12-21
Payer: COMMERCIAL

## 2021-12-21 ENCOUNTER — LAB ENCOUNTER (OUTPATIENT)
Dept: LAB | Age: 31
End: 2021-12-21
Attending: PODIATRIST
Payer: COMMERCIAL

## 2021-12-21 VITALS — HEIGHT: 58 IN | WEIGHT: 150 LBS | BODY MASS INDEX: 31.49 KG/M2

## 2021-12-21 DIAGNOSIS — B35.1 ONYCHOMYCOSIS: ICD-10-CM

## 2021-12-21 DIAGNOSIS — L03.031 PARONYCHIA OF TOE OF RIGHT FOOT: ICD-10-CM

## 2021-12-21 DIAGNOSIS — B35.1 ONYCHOMYCOSIS: Primary | ICD-10-CM

## 2021-12-21 PROCEDURE — 80076 HEPATIC FUNCTION PANEL: CPT | Performed by: PODIATRIST

## 2021-12-21 PROCEDURE — 99213 OFFICE O/P EST LOW 20 MIN: CPT | Performed by: PODIATRIST

## 2021-12-21 PROCEDURE — 3008F BODY MASS INDEX DOCD: CPT | Performed by: PODIATRIST

## 2021-12-21 RX ORDER — TERBINAFINE HYDROCHLORIDE 250 MG/1
250 TABLET ORAL DAILY
COMMUNITY
Start: 2021-11-02

## 2021-12-21 NOTE — PROGRESS NOTES
[facility] Progress [facility]    Subjective:     Patient is here for follow-up of fungal nails and ingrown nail. She has had the ingrown off and on and we had wanted to see how she did with the oral Lamisil before we went ahead with a permanent I&D.

## 2022-01-11 ENCOUNTER — PATIENT MESSAGE (OUTPATIENT)
Dept: FAMILY MEDICINE CLINIC | Facility: CLINIC | Age: 32
End: 2022-01-11

## 2022-01-12 NOTE — TELEPHONE ENCOUNTER
From: Jair Rider  To: Hieu Hanna DO  Sent: 1/11/2022 7:14 PM CST  Subject: Birth control pills    Fliplo Dr Zuniga,    I hope you are well. I have been taking the birth control pills you gave me.  Last month, on week 4 I had spotting, not a real flow

## 2022-02-15 ENCOUNTER — OFFICE VISIT (OUTPATIENT)
Dept: ORTHOPEDICS CLINIC | Facility: CLINIC | Age: 32
End: 2022-02-15
Payer: COMMERCIAL

## 2022-02-15 DIAGNOSIS — M79.674 PAIN OF TOE OF RIGHT FOOT: ICD-10-CM

## 2022-02-15 DIAGNOSIS — L60.0 ONYCHOCRYPTOSIS: Primary | ICD-10-CM

## 2022-02-15 PROCEDURE — 99213 OFFICE O/P EST LOW 20 MIN: CPT | Performed by: PODIATRIST

## 2022-02-15 PROCEDURE — 11750 EXCISION NAIL&NAIL MATRIX: CPT | Performed by: PODIATRIST

## 2022-02-15 NOTE — PROGRESS NOTES
[facility] Progress Note    Subjective:     Patient is here for long-term follow-up of the right great toenail. About a year ago we had removed the nail for fungus and she had gone on the oral meds as well as topical she is actually done pretty well but developed an ingrown nail on both sides and we had talked about doing a permanent removal of those corners        Objective:     Exam right great toe nail is in good condition a little thick at the distal aspect both nail borders are incurvated with mild edema and tenderness but no infection  NVSI                  Assessment/Plan:     Diagnoses and all orders for this visit:    Onychocryptosis    Pain of toe of right foot    She is no longer on the Lamisil because of elevated liver enzyme. Procedure and potential side effects discussed. Local anesthesia achieved to affected right great toe. Sterile prep followed. Small tournicot placed around base of toe for exsanguination. bilateral Nail corner removed with english anvil. 3 applications of phenol for 30 seconds each applied to nail matrix. Application of alcohol followed. Sterile dressing applied. Tournicot released. Good blood flow returned to affected digit. Pt appeared to tolerate procedure and anesthesia well. Instructions for aftercare followed. Follow up in 1-2 weeks. Teresa Pavon. Bart Osborne DPM  [facility]    [employer] speech recognition software was used to prepare this note. If a word or phrase is confusing, it is likely do to a failure of recognition. Please contact me with any questions or clarifications.

## 2022-03-22 ENCOUNTER — HOSPITAL ENCOUNTER (OUTPATIENT)
Age: 32
Discharge: HOME | End: 2022-03-22
Payer: COMMERCIAL

## 2022-03-22 VITALS
SYSTOLIC BLOOD PRESSURE: 106 MMHG | BODY MASS INDEX: 27.48 KG/M2 | DIASTOLIC BLOOD PRESSURE: 69 MMHG | WEIGHT: 140 LBS | HEIGHT: 60 IN | HEART RATE: 80 BPM | OXYGEN SATURATION: 98 % | RESPIRATION RATE: 15 BRPM | TEMPERATURE: 98 F

## 2022-03-22 DIAGNOSIS — J06.9 VIRAL URI: Primary | ICD-10-CM

## 2022-03-22 LAB
S PYO AG THROAT QL: NEGATIVE
SARS-COV-2 RNA RESP QL NAA+PROBE: NOT DETECTED

## 2022-03-22 PROCEDURE — 87880 STREP A ASSAY W/OPTIC: CPT | Performed by: NURSE PRACTITIONER

## 2022-03-22 PROCEDURE — 99203 OFFICE O/P NEW LOW 30 MIN: CPT | Performed by: NURSE PRACTITIONER

## 2022-03-22 PROCEDURE — U0002 COVID-19 LAB TEST NON-CDC: HCPCS | Performed by: NURSE PRACTITIONER

## 2022-03-22 RX ORDER — METHYLPREDNISOLONE 4 MG/1
TABLET ORAL
Qty: 1 EACH | Refills: 0 | Status: SHIPPED | OUTPATIENT
Start: 2022-03-22

## 2022-03-22 RX ORDER — BENZONATATE 100 MG/1
100 CAPSULE ORAL 3 TIMES DAILY PRN
Qty: 30 CAPSULE | Refills: 0 | Status: SHIPPED | OUTPATIENT
Start: 2022-03-22 | End: 2022-04-21

## 2022-03-24 ENCOUNTER — TELEPHONE (OUTPATIENT)
Dept: FAMILY MEDICINE CLINIC | Facility: CLINIC | Age: 32
End: 2022-03-24

## 2022-03-24 ENCOUNTER — TELEMEDICINE (OUTPATIENT)
Dept: FAMILY MEDICINE CLINIC | Facility: CLINIC | Age: 32
End: 2022-03-24

## 2022-03-24 DIAGNOSIS — J01.00 ACUTE NON-RECURRENT MAXILLARY SINUSITIS: Primary | ICD-10-CM

## 2022-03-24 PROCEDURE — 99213 OFFICE O/P EST LOW 20 MIN: CPT | Performed by: FAMILY MEDICINE

## 2022-03-24 RX ORDER — AMOXICILLIN 875 MG/1
875 TABLET, COATED ORAL 2 TIMES DAILY
Qty: 20 TABLET | Refills: 0 | Status: SHIPPED | OUTPATIENT
Start: 2022-03-24

## 2022-03-24 RX ORDER — ALBUTEROL SULFATE 90 UG/1
2 AEROSOL, METERED RESPIRATORY (INHALATION) EVERY 6 HOURS PRN
Qty: 1 EACH | Refills: 0 | Status: SHIPPED | OUTPATIENT
Start: 2022-03-24

## 2022-03-24 NOTE — PATIENT INSTRUCTIONS
Take the Amoxil 875 mg one tablet with breakfast and dinner for 10 days. While on the antibiotics, eat yogurt or take a probiotic to help replenish the good bacteria in your intestines. Use the Albuterol inhaler 2 puffs every 4 hours as needed for chest tightness, shortness of breath or wheezing. Finish the Medrol dose charleen as prescribed. Continue the [employer] as needed for the cough. Continue the Dayquil and Nyquil. You can use Chloraseptic lozenges every 2-3 hours as needed for throat discomfort. Push fluids and get plenty of rest.    Go to the immediate care or ER if you have increased difficulty breathing, chest pain or you are coughing up blood. Follow up with your doctor for any new or worsening symptoms.

## 2022-03-24 NOTE — TELEPHONE ENCOUNTER
Called patient who states her symptoms have gotten worse since IC visit on 3/22/22. Throat is very painful and has a hoarse voice. Productive cough with green sputum. Feels congestion in chest. Slight runny nose. Denies fever. Taking prednisone, benzonatate, dayquil. States is drinking plenty of fluids. Is not taking antihistamine, tylenol or ibuprofen. States has had this before and the only thing that helps is antibiotics. Advised would need video visit to evaluate if abx are appropriate. Scheduled for this afternoon.     Future Appointments   Date Time Provider Frankie Kumar   3/24/2022  4:30 PM Kate Romero DO [facility]

## 2022-03-24 NOTE — TELEPHONE ENCOUNTER
Pt called stating she went to [facility] Immediate care on 3-22-22. Said Meds were given but no antibiotic. Pt said meds that were given are not helping at all. She said they told her it's viral.    Pt said it's in her chest. Cannot stop coughing. Throat is very painful. Cannot sleep. Wants antibiotic immediately.

## 2022-07-29 ENCOUNTER — HOSPITAL ENCOUNTER (OUTPATIENT)
Age: 32
Discharge: HOME | End: 2022-07-29
Payer: COMMERCIAL

## 2022-07-29 VITALS
SYSTOLIC BLOOD PRESSURE: 115 MMHG | HEIGHT: 58 IN | TEMPERATURE: 98 F | OXYGEN SATURATION: 99 % | RESPIRATION RATE: 18 BRPM | WEIGHT: 150 LBS | DIASTOLIC BLOOD PRESSURE: 75 MMHG | HEART RATE: 87 BPM | BODY MASS INDEX: 31.49 KG/M2

## 2022-07-29 DIAGNOSIS — N89.8 VAGINAL DISCHARGE: Primary | ICD-10-CM

## 2022-07-29 LAB
B-HCG UR QL: NEGATIVE
POCT BILIRUBIN URINE: NEGATIVE
POCT GLUCOSE URINE: NEGATIVE MG/DL
POCT KETONE URINE: 15 MG/DL
POCT NITRITE URINE: NEGATIVE
POCT PH URINE: 6 (ref 5–8)
POCT PROTEIN URINE: NEGATIVE MG/DL
POCT SPECIFIC GRAVITY URINE: 1.03
POCT URINE COLOR: YELLOW
POCT UROBILINOGEN URINE: 1 MG/DL

## 2022-07-29 PROCEDURE — 99204 OFFICE O/P NEW MOD 45 MIN: CPT | Performed by: NURSE PRACTITIONER

## 2022-07-29 PROCEDURE — 87086 URINE CULTURE/COLONY COUNT: CPT | Performed by: NURSE PRACTITIONER

## 2022-07-29 PROCEDURE — 81025 URINE PREGNANCY TEST: CPT | Performed by: NURSE PRACTITIONER

## 2022-07-29 PROCEDURE — 87510 GARDNER VAG DNA DIR PROBE: CPT | Performed by: NURSE PRACTITIONER

## 2022-07-29 PROCEDURE — 87591 N.GONORRHOEAE DNA AMP PROB: CPT | Performed by: NURSE PRACTITIONER

## 2022-07-29 PROCEDURE — 87480 CANDIDA DNA DIR PROBE: CPT | Performed by: NURSE PRACTITIONER

## 2022-07-29 PROCEDURE — 81002 URINALYSIS NONAUTO W/O SCOPE: CPT | Performed by: NURSE PRACTITIONER

## 2022-07-29 PROCEDURE — 87491 CHLMYD TRACH DNA AMP PROBE: CPT | Performed by: NURSE PRACTITIONER

## 2022-07-29 PROCEDURE — 87660 TRICHOMONAS VAGIN DIR PROBE: CPT | Performed by: NURSE PRACTITIONER

## 2022-07-29 RX ORDER — FLUCONAZOLE 150 MG/1
150 TABLET ORAL ONCE
Qty: 1 TABLET | Refills: 0 | Status: SHIPPED | OUTPATIENT
Start: 2022-07-29 | End: 2022-07-29

## 2022-07-30 NOTE — ED INITIAL ASSESSMENT (HPI)
Pt c/o vaginal itching, burning, reddness and discharge, denies foul odor, pt had burning with urination when giving sample here, no other urinary s/s

## 2022-08-01 LAB
C TRACH DNA SPEC QL NAA+PROBE: NEGATIVE
N GONORRHOEA DNA SPEC QL NAA+PROBE: NEGATIVE

## 2022-08-29 ENCOUNTER — OFFICE VISIT (OUTPATIENT)
Dept: FAMILY MEDICINE CLINIC | Facility: CLINIC | Age: 32
End: 2022-08-29
Payer: COMMERCIAL

## 2022-08-29 VITALS
BODY MASS INDEX: 30.04 KG/M2 | WEIGHT: 151 LBS | DIASTOLIC BLOOD PRESSURE: 70 MMHG | HEART RATE: 64 BPM | HEIGHT: 59.33 IN | SYSTOLIC BLOOD PRESSURE: 110 MMHG | TEMPERATURE: 97 F | OXYGEN SATURATION: 98 % | RESPIRATION RATE: 16 BRPM

## 2022-08-29 DIAGNOSIS — M54.50 CHRONIC BILATERAL LOW BACK PAIN WITHOUT SCIATICA: ICD-10-CM

## 2022-08-29 DIAGNOSIS — Z00.00 ROUTINE GENERAL MEDICAL EXAMINATION AT A HEALTH CARE FACILITY: Primary | ICD-10-CM

## 2022-08-29 DIAGNOSIS — E28.2 PCOS (POLYCYSTIC OVARIAN SYNDROME): ICD-10-CM

## 2022-08-29 DIAGNOSIS — G89.29 CHRONIC BILATERAL LOW BACK PAIN WITHOUT SCIATICA: ICD-10-CM

## 2022-08-29 DIAGNOSIS — Z00.00 LABORATORY EXAM ORDERED AS PART OF ROUTINE GENERAL MEDICAL EXAMINATION: ICD-10-CM

## 2022-08-29 DIAGNOSIS — E55.9 VITAMIN D DEFICIENCY: ICD-10-CM

## 2022-08-29 PROCEDURE — 3074F SYST BP LT 130 MM HG: CPT | Performed by: FAMILY MEDICINE

## 2022-08-29 PROCEDURE — 3078F DIAST BP <80 MM HG: CPT | Performed by: FAMILY MEDICINE

## 2022-08-29 PROCEDURE — 99395 PREV VISIT EST AGE 18-39: CPT | Performed by: FAMILY MEDICINE

## 2022-08-29 PROCEDURE — 3008F BODY MASS INDEX DOCD: CPT | Performed by: FAMILY MEDICINE

## 2022-09-24 ENCOUNTER — LAB ENCOUNTER (OUTPATIENT)
Dept: LAB | Age: 32
End: 2022-09-24
Attending: FAMILY MEDICINE

## 2022-09-24 DIAGNOSIS — E55.9 VITAMIN D DEFICIENCY: ICD-10-CM

## 2022-09-24 DIAGNOSIS — Z00.00 LABORATORY EXAM ORDERED AS PART OF ROUTINE GENERAL MEDICAL EXAMINATION: ICD-10-CM

## 2022-09-24 LAB
ALBUMIN SERPL-MCNC: 3.6 G/DL (ref 3.4–5)
ALBUMIN/GLOB SERPL: 1 {RATIO} (ref 1–2)
ALP LIVER SERPL-CCNC: 94 U/L
ALT SERPL-CCNC: 87 U/L
ANION GAP SERPL CALC-SCNC: 9 MMOL/L (ref 0–18)
AST SERPL-CCNC: 30 U/L (ref 15–37)
BASOPHILS # BLD AUTO: 0.1 X10(3) UL (ref 0–0.2)
BASOPHILS NFR BLD AUTO: 1.1 %
BILIRUB SERPL-MCNC: 0.6 MG/DL (ref 0.1–2)
BUN BLD-MCNC: 7 MG/DL (ref 7–18)
CALCIUM BLD-MCNC: 8.8 MG/DL (ref 8.5–10.1)
CHLORIDE SERPL-SCNC: 110 MMOL/L (ref 98–112)
CHOLEST SERPL-MCNC: 120 MG/DL (ref ?–200)
CO2 SERPL-SCNC: 23 MMOL/L (ref 21–32)
CREAT BLD-MCNC: 0.67 MG/DL
EOSINOPHIL # BLD AUTO: 0.31 X10(3) UL (ref 0–0.7)
EOSINOPHIL NFR BLD AUTO: 3.4 %
ERYTHROCYTE [DISTWIDTH] IN BLOOD BY AUTOMATED COUNT: 13 %
FASTING PATIENT LIPID ANSWER: YES
FASTING STATUS PATIENT QL REPORTED: YES
GFR SERPLBLD BASED ON 1.73 SQ M-ARVRAT: 119 ML/MIN/1.73M2 (ref 60–?)
GLOBULIN PLAS-MCNC: 3.6 G/DL (ref 2.8–4.4)
GLUCOSE BLD-MCNC: 93 MG/DL (ref 70–99)
HCT VFR BLD AUTO: 40.6 %
HDLC SERPL-MCNC: 43 MG/DL (ref 40–59)
HGB BLD-MCNC: 13.1 G/DL
IMM GRANULOCYTES # BLD AUTO: 0.02 X10(3) UL (ref 0–1)
IMM GRANULOCYTES NFR BLD: 0.2 %
LDLC SERPL CALC-MCNC: 62 MG/DL (ref ?–100)
LYMPHOCYTES # BLD AUTO: 3.45 X10(3) UL (ref 1–4)
LYMPHOCYTES NFR BLD AUTO: 38 %
MCH RBC QN AUTO: 29 PG (ref 26–34)
MCHC RBC AUTO-ENTMCNC: 32.3 G/DL (ref 31–37)
MCV RBC AUTO: 90 FL
MONOCYTES # BLD AUTO: 0.66 X10(3) UL (ref 0.1–1)
MONOCYTES NFR BLD AUTO: 7.3 %
NEUTROPHILS # BLD AUTO: 4.54 X10 (3) UL (ref 1.5–7.7)
NEUTROPHILS # BLD AUTO: 4.54 X10(3) UL (ref 1.5–7.7)
NEUTROPHILS NFR BLD AUTO: 50 %
NONHDLC SERPL-MCNC: 77 MG/DL (ref ?–130)
OSMOLALITY SERPL CALC.SUM OF ELEC: 292 MOSM/KG (ref 275–295)
PLATELET # BLD AUTO: 278 10(3)UL (ref 150–450)
POTASSIUM SERPL-SCNC: 4.1 MMOL/L (ref 3.5–5.1)
PROT SERPL-MCNC: 7.2 G/DL (ref 6.4–8.2)
RBC # BLD AUTO: 4.51 X10(6)UL
SODIUM SERPL-SCNC: 142 MMOL/L (ref 136–145)
TRIGL SERPL-MCNC: 70 MG/DL (ref 30–149)
TSI SER-ACNC: 1.65 MIU/ML (ref 0.36–3.74)
VIT D+METAB SERPL-MCNC: 24.4 NG/ML (ref 30–100)
VLDLC SERPL CALC-MCNC: 10 MG/DL (ref 0–30)
WBC # BLD AUTO: 9.1 X10(3) UL (ref 4–11)

## 2022-09-24 PROCEDURE — 82306 VITAMIN D 25 HYDROXY: CPT

## 2022-09-24 PROCEDURE — 85025 COMPLETE CBC W/AUTO DIFF WBC: CPT

## 2022-09-24 PROCEDURE — 84443 ASSAY THYROID STIM HORMONE: CPT

## 2022-09-24 PROCEDURE — 80061 LIPID PANEL: CPT

## 2022-09-24 PROCEDURE — 36415 COLL VENOUS BLD VENIPUNCTURE: CPT

## 2022-09-24 PROCEDURE — 80053 COMPREHEN METABOLIC PANEL: CPT

## 2022-10-11 ENCOUNTER — TELEPHONE (OUTPATIENT)
Dept: FAMILY MEDICINE CLINIC | Facility: CLINIC | Age: 32
End: 2022-10-11

## 2022-10-11 NOTE — TELEPHONE ENCOUNTER
----- Message from [facility], DO sent at 10/5/2022 11:39 PM CDT -----  Pls inform pt that labs demonstrate one liver enzyme is elevated but rest are wnl.  Vit d is low, needs to take vit d3 2000iu daily otc

## 2022-12-20 ENCOUNTER — TELEPHONE (OUTPATIENT)
Dept: FAMILY MEDICINE CLINIC | Facility: CLINIC | Age: 32
End: 2022-12-20

## 2022-12-20 NOTE — TELEPHONE ENCOUNTER
Called patient - name and  verified. She was reporting having cold symptoms for last three days. Cough, fever, body ache, and headache. No SOB or difficulty breathing. She has been taking fluids, vitamin c, Advil Q 6. She was offered appointment with Dr. Yanni Owen on first available on . She has not done home covid test, which was recommended. She was advised if her symptoms worsen nor persist to go to UC/IM. Home symptoms management education provided. Patient verbalized and asked to cancel appt in February.

## 2023-03-09 ENCOUNTER — TELEPHONE (OUTPATIENT)
Dept: FAMILY MEDICINE CLINIC | Facility: CLINIC | Age: 33
End: 2023-03-09

## 2023-03-09 NOTE — TELEPHONE ENCOUNTER
Triage call transferred. Spoke with pt indicating experiencing left side facial droopiness, lip and facial numbness, unable to close left eye, feels weak. Advised concerned possible bell's palsy, stroke, and needs to be taken to ER. Pt indicated would like to see PCP prior to going to ER, explained will need further work-up, imaging, we are not able to do in office, and OV and ER visits are not covered in the same day. Pt declined, stating would like PCP to advise first. Informed will speak with PCP now to confirm how to proceed. Spoke with PCP, indicating will speak with pt- call transferred. Pt verbalized understanding. LAURAI.

## 2023-03-09 NOTE — TELEPHONE ENCOUNTER
S/w pt earlier when call was transferred. Instructed pt to proceed to ED for further evaluation for symptoms, likely Bell's Palsy but will need work-up done. She verbalized understanding and agreed to go to ED.

## 2023-03-10 ENCOUNTER — APPOINTMENT (OUTPATIENT)
Dept: MRI IMAGING | Facility: HOSPITAL | Age: 33
End: 2023-03-10
Attending: EMERGENCY MEDICINE
Payer: COMMERCIAL

## 2023-03-10 ENCOUNTER — HOSPITAL ENCOUNTER (EMERGENCY)
Facility: HOSPITAL | Age: 33
Discharge: HOME | End: 2023-03-10
Attending: EMERGENCY MEDICINE
Payer: COMMERCIAL

## 2023-03-10 VITALS
HEART RATE: 60 BPM | DIASTOLIC BLOOD PRESSURE: 68 MMHG | BODY MASS INDEX: 33.17 KG/M2 | RESPIRATION RATE: 16 BRPM | OXYGEN SATURATION: 100 % | HEIGHT: 58 IN | TEMPERATURE: 97 F | SYSTOLIC BLOOD PRESSURE: 97 MMHG | WEIGHT: 158 LBS

## 2023-03-10 DIAGNOSIS — G51.0 BELL'S PALSY: Primary | ICD-10-CM

## 2023-03-10 LAB
ALBUMIN SERPL-MCNC: 3.8 G/DL (ref 3.4–5)
ALBUMIN/GLOB SERPL: 1 {RATIO} (ref 1–2)
ALP LIVER SERPL-CCNC: 101 U/L
ALT SERPL-CCNC: 124 U/L
ANION GAP SERPL CALC-SCNC: 2 MMOL/L (ref 0–18)
AST SERPL-CCNC: 58 U/L (ref 15–37)
BASOPHILS # BLD AUTO: 0.07 X10(3) UL (ref 0–0.2)
BASOPHILS NFR BLD AUTO: 0.9 %
BILIRUB SERPL-MCNC: 0.7 MG/DL (ref 0.1–2)
BUN BLD-MCNC: 8 MG/DL (ref 7–18)
CALCIUM BLD-MCNC: 9.1 MG/DL (ref 8.5–10.1)
CHLORIDE SERPL-SCNC: 109 MMOL/L (ref 98–112)
CO2 SERPL-SCNC: 27 MMOL/L (ref 21–32)
CREAT BLD-MCNC: 0.66 MG/DL
EOSINOPHIL # BLD AUTO: 0.26 X10(3) UL (ref 0–0.7)
EOSINOPHIL NFR BLD AUTO: 3.5 %
ERYTHROCYTE [DISTWIDTH] IN BLOOD BY AUTOMATED COUNT: 12.8 %
GFR SERPLBLD BASED ON 1.73 SQ M-ARVRAT: 119 ML/MIN/1.73M2 (ref 60–?)
GLOBULIN PLAS-MCNC: 3.7 G/DL (ref 2.8–4.4)
GLUCOSE BLD-MCNC: 106 MG/DL (ref 70–99)
HCT VFR BLD AUTO: 42 %
HGB BLD-MCNC: 13.7 G/DL
IMM GRANULOCYTES # BLD AUTO: 0.02 X10(3) UL (ref 0–1)
IMM GRANULOCYTES NFR BLD: 0.3 %
LYMPHOCYTES # BLD AUTO: 2.77 X10(3) UL (ref 1–4)
LYMPHOCYTES NFR BLD AUTO: 36.9 %
MCH RBC QN AUTO: 28.5 PG (ref 26–34)
MCHC RBC AUTO-ENTMCNC: 32.6 G/DL (ref 31–37)
MCV RBC AUTO: 87.3 FL
MONOCYTES # BLD AUTO: 0.58 X10(3) UL (ref 0.1–1)
MONOCYTES NFR BLD AUTO: 7.7 %
NEUTROPHILS # BLD AUTO: 3.8 X10 (3) UL (ref 1.5–7.7)
NEUTROPHILS # BLD AUTO: 3.8 X10(3) UL (ref 1.5–7.7)
NEUTROPHILS NFR BLD AUTO: 50.7 %
OSMOLALITY SERPL CALC.SUM OF ELEC: 285 MOSM/KG (ref 275–295)
PLATELET # BLD AUTO: 252 10(3)UL (ref 150–450)
POTASSIUM SERPL-SCNC: 4 MMOL/L (ref 3.5–5.1)
PROT SERPL-MCNC: 7.5 G/DL (ref 6.4–8.2)
RBC # BLD AUTO: 4.81 X10(6)UL
SODIUM SERPL-SCNC: 138 MMOL/L (ref 136–145)
WBC # BLD AUTO: 7.5 X10(3) UL (ref 4–11)

## 2023-03-10 PROCEDURE — A9575 INJ GADOTERATE MEGLUMI 0.1ML: HCPCS | Performed by: EMERGENCY MEDICINE

## 2023-03-10 PROCEDURE — 36415 COLL VENOUS BLD VENIPUNCTURE: CPT

## 2023-03-10 PROCEDURE — 80053 COMPREHEN METABOLIC PANEL: CPT | Performed by: EMERGENCY MEDICINE

## 2023-03-10 PROCEDURE — 70553 MRI BRAIN STEM W/O & W/DYE: CPT | Performed by: EMERGENCY MEDICINE

## 2023-03-10 PROCEDURE — 99284 EMERGENCY DEPT VISIT MOD MDM: CPT

## 2023-03-10 PROCEDURE — 85025 COMPLETE CBC W/AUTO DIFF WBC: CPT | Performed by: EMERGENCY MEDICINE

## 2023-03-10 RX ORDER — VALACYCLOVIR HYDROCHLORIDE 1 G/1
1000 TABLET, FILM COATED ORAL 3 TIMES DAILY
Qty: 21 TABLET | Refills: 0 | Status: SHIPPED | OUTPATIENT
Start: 2023-03-10 | End: 2023-03-17

## 2023-03-10 RX ORDER — GADOTERATE MEGLUMINE 376.9 MG/ML
15 INJECTION INTRAVENOUS
Status: COMPLETED | OUTPATIENT
Start: 2023-03-10 | End: 2023-03-10

## 2023-03-10 RX ORDER — PREDNISONE 20 MG/1
60 TABLET ORAL DAILY
Qty: 15 TABLET | Refills: 0 | Status: SHIPPED | OUTPATIENT
Start: 2023-03-10 | End: 2023-03-15

## 2023-03-10 NOTE — DISCHARGE INSTRUCTIONS
Medications as prescribed. Buy an eye patch or something to cover your left eye while you sleep at night so it does not dry out.

## 2023-03-10 NOTE — ED INITIAL ASSESSMENT (HPI)
Pt c/o migraine on left sided headache x 2 days. Pt states having right sided facial numbness and states \"when I close my eye, the eye doesn't close and my eye feels watery but only on one side. \" Pt has mild facial droop noted to right side, noted for 2 days.

## 2023-03-13 ENCOUNTER — OFFICE VISIT (OUTPATIENT)
Dept: FAMILY MEDICINE CLINIC | Facility: CLINIC | Age: 33
End: 2023-03-13
Payer: COMMERCIAL

## 2023-03-13 VITALS
RESPIRATION RATE: 16 BRPM | HEIGHT: 58 IN | HEART RATE: 69 BPM | OXYGEN SATURATION: 98 % | BODY MASS INDEX: 33.37 KG/M2 | DIASTOLIC BLOOD PRESSURE: 62 MMHG | SYSTOLIC BLOOD PRESSURE: 102 MMHG | TEMPERATURE: 98 F | WEIGHT: 159 LBS

## 2023-03-13 DIAGNOSIS — R51.9 CHRONIC LEFT-SIDED HEADACHES: ICD-10-CM

## 2023-03-13 DIAGNOSIS — F41.8 DEPRESSION WITH ANXIETY: ICD-10-CM

## 2023-03-13 DIAGNOSIS — G89.29 CHRONIC LEFT-SIDED HEADACHES: ICD-10-CM

## 2023-03-13 DIAGNOSIS — G51.0 BELL'S PALSY: Primary | ICD-10-CM

## 2023-03-13 DIAGNOSIS — E28.2 PCOS (POLYCYSTIC OVARIAN SYNDROME): ICD-10-CM

## 2023-03-13 PROCEDURE — 3074F SYST BP LT 130 MM HG: CPT | Performed by: FAMILY MEDICINE

## 2023-03-13 PROCEDURE — 3008F BODY MASS INDEX DOCD: CPT | Performed by: FAMILY MEDICINE

## 2023-03-13 PROCEDURE — 3078F DIAST BP <80 MM HG: CPT | Performed by: FAMILY MEDICINE

## 2023-03-13 PROCEDURE — 99214 OFFICE O/P EST MOD 30 MIN: CPT | Performed by: FAMILY MEDICINE

## 2023-03-13 RX ORDER — ALPRAZOLAM 0.25 MG/1
0.25 TABLET ORAL NIGHTLY PRN
Qty: 20 TABLET | Refills: 0 | Status: SHIPPED | OUTPATIENT
Start: 2023-03-13

## 2023-03-13 RX ORDER — ESCITALOPRAM OXALATE 10 MG/1
TABLET ORAL
Qty: 30 TABLET | Refills: 1 | Status: SHIPPED | OUTPATIENT
Start: 2023-03-13

## 2023-03-13 RX ORDER — DESOGESTREL AND ETHINYL ESTRADIOL 0.15-0.03
KIT ORAL
COMMUNITY
Start: 2022-12-16 | End: 2023-03-13

## 2023-03-14 ENCOUNTER — PATIENT MESSAGE (OUTPATIENT)
Dept: FAMILY MEDICINE CLINIC | Facility: CLINIC | Age: 33
End: 2023-03-14

## 2023-03-14 NOTE — TELEPHONE ENCOUNTER
Please advise on patient message for work note. Patient requesting note for work for Friday 3/10/23, Monday 3/13, and Tuesday 3/14.     Last office visit:   ED- 3/10/23    Office visit with Dr. Suazo Last: 3/13/23

## 2023-03-14 NOTE — TELEPHONE ENCOUNTER
From: Flora Garcias  To: Colby Foreman DO  Sent: 3/14/2023 10:27 AM CDT  Subject: Doctor's Note    Dr Laura Morse,    Thank you for seeing me yesterday. I forgot to ask for a doctor's note yesterday for my work. It's policy at my work place if you take 3 days in row, should provide a doctor's note. I took Friday to go to ER, Monday and Tuesday (today) to rest. I am mentally exhausted, I needed these two days to rest.    Any possibility your office could fax and email a note to my work place indicating the need for these sick days? This is the address:    [facility]  Fax: [phone]  Email: [email or web address] and you can cc Natalie Santo@"[email or web address]    Thank you so much. Please call me if you have any questions.     Thanks,  Andrea Posadas

## 2023-03-14 NOTE — TELEPHONE ENCOUNTER
Patient is asking for a note to be excused from today as well to rest.  Just to verify, to provide only a note to excuse from work yesterday, correct?

## 2023-03-20 NOTE — TELEPHONE ENCOUNTER
Pt does not need another course of medication. Bell's palsy can take up to months (4-6) to fully recover.

## 2023-04-07 ENCOUNTER — HOSPITAL ENCOUNTER (OUTPATIENT)
Age: 33
Discharge: HOME | End: 2023-04-07
Payer: COMMERCIAL

## 2023-04-07 VITALS
HEART RATE: 104 BPM | TEMPERATURE: 101 F | SYSTOLIC BLOOD PRESSURE: 114 MMHG | WEIGHT: 156 LBS | DIASTOLIC BLOOD PRESSURE: 69 MMHG | OXYGEN SATURATION: 100 % | BODY MASS INDEX: 33 KG/M2 | RESPIRATION RATE: 18 BRPM

## 2023-04-07 DIAGNOSIS — J02.0 STREPTOCOCCAL SORE THROAT: ICD-10-CM

## 2023-04-07 DIAGNOSIS — J02.9 SORE THROAT: Primary | ICD-10-CM

## 2023-04-07 LAB — S PYO AG THROAT QL: POSITIVE

## 2023-04-07 RX ORDER — AMOXICILLIN 500 MG/1
500 TABLET, FILM COATED ORAL 2 TIMES DAILY
Qty: 20 TABLET | Refills: 0 | Status: SHIPPED | OUTPATIENT
Start: 2023-04-07 | End: 2023-04-17

## 2023-04-07 RX ORDER — ACETAMINOPHEN 325 MG/1
650 TABLET ORAL ONCE
Status: COMPLETED | OUTPATIENT
Start: 2023-04-07 | End: 2023-04-07

## 2023-04-07 NOTE — DISCHARGE INSTRUCTIONS
Take an over-the-counter probiotic daily while on the antibiotics. Over-the-counter ibuprofen 600 mg every 6 hours as needed for pain. May also alternate with Tylenol 500 mg every 4 hours as needed for pain    Interventions for sore throat: Warm salt water gargles 3 times daily. Mix a teaspoon of honey in the liquid such as juice or tea. Cough or throat drops. Drink plenty of fluids, mainly consisting of water. Follow-up with your doctor in 2 to 3 days. Go to the nearest ER for new or worsening symptoms.

## 2023-05-12 NOTE — TELEPHONE ENCOUNTER
Dr Santosh Carolina, last Physical was 09/15/14 and labs were ordered at that time. Never completed    No future appointments.     Please advise pt's request? as per HPI

## 2023-07-06 ENCOUNTER — PATIENT MESSAGE (OUTPATIENT)
Dept: FAMILY MEDICINE CLINIC | Facility: CLINIC | Age: 33
End: 2023-07-06

## 2023-07-06 NOTE — TELEPHONE ENCOUNTER
From: Joshua Suárez  To: [facility] Behavior[facility] MedicineDO  Sent: 7/6/2023 12:52 PM CDT  Subject: My Photo    Dr Nubia Hyde, here is a photo. I feel heaviness on my right cheek and a little drop in the corner of my lip.

## 2023-07-07 NOTE — TELEPHONE ENCOUNTER
Called patient and requested she send another photo today for comparison. Instructed to have her hair over her RIGHT shoulder.

## 2023-07-07 NOTE — TELEPHONE ENCOUNTER
Patient send another picture . Please see the picture . Patient also requesting another round of medications .

## 2023-07-11 NOTE — TELEPHONE ENCOUNTER
Pt needs appt, unable to prescribe meds based on pics.  Pt was informed to go to UC over the weekend, if symptoms persist or worsen

## 2023-07-11 NOTE — TELEPHONE ENCOUNTER
See TE 7/6/23    Future Appointments   Date Time Provider Frankie Kumar   7/22/2023 12:20 PM Jonathan Mcintyre, DO [facility]

## 2023-07-22 ENCOUNTER — TELEPHONE (OUTPATIENT)
Dept: FAMILY MEDICINE CLINIC | Facility: CLINIC | Age: 33
End: 2023-07-22

## 2023-07-22 NOTE — TELEPHONE ENCOUNTER
Pt arrived 18 minutes late for appointment, Advised we would have to r/s. She said she will call back Monday, she did not have her phone with. Marked no show, without fee.

## 2023-07-24 ENCOUNTER — PATIENT MESSAGE (OUTPATIENT)
Dept: FAMILY MEDICINE CLINIC | Facility: CLINIC | Age: 33
End: 2023-07-24

## 2023-07-24 ENCOUNTER — OFFICE VISIT (OUTPATIENT)
Dept: FAMILY MEDICINE CLINIC | Facility: CLINIC | Age: 33
End: 2023-07-24
Payer: COMMERCIAL

## 2023-07-24 VITALS
WEIGHT: 158.63 LBS | SYSTOLIC BLOOD PRESSURE: 100 MMHG | HEART RATE: 78 BPM | OXYGEN SATURATION: 100 % | BODY MASS INDEX: 33 KG/M2 | DIASTOLIC BLOOD PRESSURE: 72 MMHG

## 2023-07-24 DIAGNOSIS — N97.9 FEMALE FERTILITY PROBLEMS: Primary | ICD-10-CM

## 2023-07-24 DIAGNOSIS — N97.9 FEMALE FERTILITY PROBLEMS: ICD-10-CM

## 2023-07-24 DIAGNOSIS — R45.89 DEPRESSED MOOD: ICD-10-CM

## 2023-07-24 DIAGNOSIS — N91.2 AMENORRHEA: Primary | ICD-10-CM

## 2023-07-24 LAB
CONTROL LINE PRESENT WITH A CLEAR BACKGROUND (YES/NO): YES YES/NO
KIT LOT #: NORMAL NUMERIC
PREGNANCY TEST, URINE: NEGATIVE

## 2023-07-24 NOTE — TELEPHONE ENCOUNTER
From: Fauzia Garcias  To: Bettina Manzanares MD  Sent: 7/24/2023 11:23 AM CDT  Subject: Anti-Depressant Pills    Hello Dr Elysia Henson,    Thank you for seeing me today. I wanted to ask if I should continue taking the anti-depressant pills now or wait until my lab work is complete. I wanted to make sure it wouldn't impact the test results. Please let me know.     Thanks,  Bradye Better

## 2023-08-01 ENCOUNTER — LAB ENCOUNTER (OUTPATIENT)
Dept: LAB | Age: 33
End: 2023-08-01
Attending: STUDENT IN AN ORGANIZED HEALTH CARE EDUCATION/TRAINING PROGRAM
Payer: COMMERCIAL

## 2023-08-01 DIAGNOSIS — N91.2 AMENORRHEA: ICD-10-CM

## 2023-08-01 LAB
B-HCG SERPL-ACNC: <1 MIU/ML
FSH SERPL-ACNC: 4.2 MIU/ML
LH SERPL-ACNC: 4.4 MIU/ML
PROLACTIN SERPL-MCNC: 3.7 NG/ML
TSI SER-ACNC: 1.78 MIU/ML (ref 0.36–3.74)

## 2023-08-01 PROCEDURE — 83001 ASSAY OF GONADOTROPIN (FSH): CPT | Performed by: STUDENT IN AN ORGANIZED HEALTH CARE EDUCATION/TRAINING PROGRAM

## 2023-08-01 PROCEDURE — 84146 ASSAY OF PROLACTIN: CPT | Performed by: STUDENT IN AN ORGANIZED HEALTH CARE EDUCATION/TRAINING PROGRAM

## 2023-08-01 PROCEDURE — 84702 CHORIONIC GONADOTROPIN TEST: CPT | Performed by: STUDENT IN AN ORGANIZED HEALTH CARE EDUCATION/TRAINING PROGRAM

## 2023-08-01 PROCEDURE — 83002 ASSAY OF GONADOTROPIN (LH): CPT | Performed by: STUDENT IN AN ORGANIZED HEALTH CARE EDUCATION/TRAINING PROGRAM

## 2023-08-01 PROCEDURE — 84443 ASSAY THYROID STIM HORMONE: CPT | Performed by: STUDENT IN AN ORGANIZED HEALTH CARE EDUCATION/TRAINING PROGRAM

## 2023-08-04 NOTE — TELEPHONE ENCOUNTER
Dr. Donn Schneider,     [facility] has an office in [place] and they have female specialists. [phone]    She has [employer] PPO so she should be good to make an appt.

## 2023-09-05 ENCOUNTER — OFFICE VISIT (OUTPATIENT)
Dept: FAMILY MEDICINE CLINIC | Facility: CLINIC | Age: 33
End: 2023-09-05
Payer: COMMERCIAL

## 2023-09-05 VITALS
TEMPERATURE: 98 F | BODY MASS INDEX: 31.49 KG/M2 | WEIGHT: 150 LBS | SYSTOLIC BLOOD PRESSURE: 106 MMHG | DIASTOLIC BLOOD PRESSURE: 60 MMHG | HEIGHT: 58 IN | RESPIRATION RATE: 18 BRPM | HEART RATE: 106 BPM | OXYGEN SATURATION: 98 %

## 2023-09-05 DIAGNOSIS — Z20.822 ENCOUNTER FOR SCREENING LABORATORY TESTING FOR COVID-19 VIRUS: ICD-10-CM

## 2023-09-05 DIAGNOSIS — J03.90 TONSILLITIS WITH EXUDATE: Primary | ICD-10-CM

## 2023-09-05 DIAGNOSIS — J02.9 SORE THROAT: ICD-10-CM

## 2023-09-05 LAB
CONTROL LINE PRESENT WITH A CLEAR BACKGROUND (YES/NO): YES YES/NO
KIT LOT #: NORMAL NUMERIC

## 2023-09-05 PROCEDURE — 87081 CULTURE SCREEN ONLY: CPT | Performed by: NURSE PRACTITIONER

## 2023-09-05 PROCEDURE — 99213 OFFICE O/P EST LOW 20 MIN: CPT | Performed by: NURSE PRACTITIONER

## 2023-09-05 PROCEDURE — 3074F SYST BP LT 130 MM HG: CPT | Performed by: NURSE PRACTITIONER

## 2023-09-05 PROCEDURE — 3078F DIAST BP <80 MM HG: CPT | Performed by: NURSE PRACTITIONER

## 2023-09-05 PROCEDURE — 87635 SARS-COV-2 COVID-19 AMP PRB: CPT | Performed by: NURSE PRACTITIONER

## 2023-09-05 PROCEDURE — 3008F BODY MASS INDEX DOCD: CPT | Performed by: NURSE PRACTITIONER

## 2023-09-05 PROCEDURE — 87880 STREP A ASSAY W/OPTIC: CPT | Performed by: NURSE PRACTITIONER

## 2023-09-05 RX ORDER — AZITHROMYCIN 250 MG/1
TABLET, FILM COATED ORAL
Qty: 6 TABLET | Refills: 0 | Status: SHIPPED | OUTPATIENT
Start: 2023-09-05 | End: 2023-09-10

## 2023-09-06 LAB — SARS-COV-2 RNA RESP QL NAA+PROBE: NOT DETECTED

## 2023-12-27 ENCOUNTER — OFFICE VISIT (OUTPATIENT)
Dept: FAMILY MEDICINE CLINIC | Facility: CLINIC | Age: 33
End: 2023-12-27
Payer: COMMERCIAL

## 2023-12-27 VITALS
HEART RATE: 91 BPM | OXYGEN SATURATION: 98 % | DIASTOLIC BLOOD PRESSURE: 71 MMHG | SYSTOLIC BLOOD PRESSURE: 116 MMHG | TEMPERATURE: 97 F | RESPIRATION RATE: 18 BRPM

## 2023-12-27 DIAGNOSIS — B37.9 YEAST INFECTION: Primary | ICD-10-CM

## 2023-12-27 PROCEDURE — 99213 OFFICE O/P EST LOW 20 MIN: CPT | Performed by: NURSE PRACTITIONER

## 2023-12-27 PROCEDURE — 3074F SYST BP LT 130 MM HG: CPT | Performed by: NURSE PRACTITIONER

## 2023-12-27 PROCEDURE — 3078F DIAST BP <80 MM HG: CPT | Performed by: NURSE PRACTITIONER

## 2023-12-27 RX ORDER — FLUCONAZOLE 150 MG/1
150 TABLET ORAL ONCE
Qty: 1 TABLET | Refills: 0 | Status: SHIPPED | OUTPATIENT
Start: 2023-12-27 | End: 2023-12-27

## 2023-12-29 ENCOUNTER — HOSPITAL ENCOUNTER (OUTPATIENT)
Age: 33
Discharge: HOME | End: 2023-12-29
Payer: COMMERCIAL

## 2023-12-29 VITALS
OXYGEN SATURATION: 100 % | TEMPERATURE: 98 F | HEART RATE: 87 BPM | RESPIRATION RATE: 18 BRPM | DIASTOLIC BLOOD PRESSURE: 72 MMHG | SYSTOLIC BLOOD PRESSURE: 111 MMHG

## 2023-12-29 DIAGNOSIS — U07.1 COVID-19: Primary | ICD-10-CM

## 2023-12-29 LAB
POCT INFLUENZA A: NEGATIVE
POCT INFLUENZA B: NEGATIVE
SARS-COV-2 RNA RESP QL NAA+PROBE: DETECTED

## 2023-12-29 PROCEDURE — U0002 COVID-19 LAB TEST NON-CDC: HCPCS | Performed by: NURSE PRACTITIONER

## 2023-12-29 PROCEDURE — 99214 OFFICE O/P EST MOD 30 MIN: CPT | Performed by: NURSE PRACTITIONER

## 2023-12-29 PROCEDURE — 87502 INFLUENZA DNA AMP PROBE: CPT | Performed by: NURSE PRACTITIONER

## 2023-12-29 RX ORDER — BENZONATATE 200 MG/1
200 CAPSULE ORAL 3 TIMES DAILY PRN
Qty: 30 CAPSULE | Refills: 0 | Status: SHIPPED | OUTPATIENT
Start: 2023-12-29 | End: 2024-01-28

## 2023-12-29 NOTE — DISCHARGE INSTRUCTIONS
Rest and drink plenty of fluids. This will help to thin the mucous in the back of your throat. Take Tylenol 1000 mg every 6 hours and/or ibuprofen 600 mg every 6-8 hours as needed for pain or fever. Use Zyrtec, Claritin, or Allegra to help with nasal drainage. You can take this up to twice a day. Take Robitussin or Delsym as needed for cough. Use the Tessalon Perles as needed to help with cough suppression. Be aware these can make you drowsy. Follow up with your PCP in 5-7 days. Your symptoms should improve in the next 7-10 days; however, the cough can linger for much longer. Thank you for choosing [facility] [id number] for your care.

## 2024-02-08 ENCOUNTER — OFFICE VISIT (OUTPATIENT)
Dept: FAMILY MEDICINE CLINIC | Facility: CLINIC | Age: 34
End: 2024-02-08
Payer: COMMERCIAL

## 2024-02-08 VITALS
OXYGEN SATURATION: 98 % | WEIGHT: 163 LBS | HEIGHT: 58 IN | HEART RATE: 81 BPM | BODY MASS INDEX: 34.22 KG/M2 | SYSTOLIC BLOOD PRESSURE: 100 MMHG | DIASTOLIC BLOOD PRESSURE: 68 MMHG

## 2024-02-08 DIAGNOSIS — M54.50 CHRONIC MIDLINE LOW BACK PAIN WITHOUT SCIATICA: Primary | ICD-10-CM

## 2024-02-08 DIAGNOSIS — L60.0 INGROWN TOENAIL: ICD-10-CM

## 2024-02-08 DIAGNOSIS — G89.29 CHRONIC MIDLINE LOW BACK PAIN WITHOUT SCIATICA: Primary | ICD-10-CM

## 2024-02-08 PROCEDURE — 3078F DIAST BP <80 MM HG: CPT | Performed by: STUDENT IN AN ORGANIZED HEALTH CARE EDUCATION/TRAINING PROGRAM

## 2024-02-08 PROCEDURE — 3074F SYST BP LT 130 MM HG: CPT | Performed by: STUDENT IN AN ORGANIZED HEALTH CARE EDUCATION/TRAINING PROGRAM

## 2024-02-08 PROCEDURE — 3008F BODY MASS INDEX DOCD: CPT | Performed by: STUDENT IN AN ORGANIZED HEALTH CARE EDUCATION/TRAINING PROGRAM

## 2024-02-08 PROCEDURE — 99213 OFFICE O/P EST LOW 20 MIN: CPT | Performed by: STUDENT IN AN ORGANIZED HEALTH CARE EDUCATION/TRAINING PROGRAM

## 2024-02-08 RX ORDER — KETOCONAZOLE 20 MG/ML
SHAMPOO TOPICAL
Qty: 120 ML | Refills: 0 | Status: SHIPPED | OUTPATIENT
Start: 2024-02-08

## 2024-02-08 NOTE — PROGRESS NOTES
[facility]    Chief Complaint   Patient presents with    Toenail     Toenail pain. Right foot unsure if infection or ingrown toenail.    Derm Problem     Back neck around hairline. Very itchy    Back Pain     Lower back. Making doing things a little bit tough. Started after had her sons birth        HPI:   Regina Garcias is 33 year old patient presenting for the followin. Toe discomfort. R large toe.  Has had toenail extraction and has been on oral medication in the past as well as topical. When nail grew back it was infected She has been on topical as well as topical medication for onychomycosis before.     2. Itchy scalp. Notes dry skin. Has tried dandruff shampoo without resolution. This has been going on for a long time. No pain. No hair loss/alopecia.     3. Lower back pain. Waxing and waning discomfort for years. The discomfort seems to be getting worse lately. Cannot do normal household work. She describes pain in the middle lumbar back. Pain with bending. Stiffness with bending. No pain shooting down the leg. She is currently doing nothing for exercise.       PMH:  Patient Active Problem List   Diagnosis    Vitamin D deficiency    Hirsutism     Past Medical History:   Diagnosis Date    Anemia     Mental disorder     anxiety       SH: reviewed     FH: reviewed        ROS: full 10 point review of systems done and otherwise negative.      Healthcare Maintenance:       PE:  Vital Signs    24 1208   PainSc: 3 - (Mild)     Wt Readings from Last 3 Encounters:   23 150 lb (68 kg)   23 158 lb 9.6 oz (71.9 kg)   23 159 lb (72.1 kg)     There is no height or weight on file to calculate BMI.     Physical Exam:  GEN: Well-appearing, NAD, nontoxic  Head: flaking erythematous scalp posterior scalp  CARD: RRR, no m/r/g  PULM: CTA jaxson  ABD: soft, nt, nd  EXT: No gross deformity. R toe without onychomycosis. Lateral ingrown nail  NEURO: no gross deficit  PSYCH: normal  affect, thought process linear     No visits with results within 4 Week(s) from this visit.   Latest known visit with results is:   Admission on 2023, Discharged on 2023   Component Date Value Ref Range Status    POCT INFLUENZA A 2023 Negative  Negative Final    POCT INFLUENZA B 2023 Negative  Negative Final    Rapid SARS-CoV-2 by PCR 2023 Detected (A)  Not Detected Final        A/P: Regina Garcias is 33 year old presenting for the followin. Ingrown toenail - conservative measures with soaks and pushing back the nail bed discussed  - PODIATRY - INTERNAL     2. Itchy scalp. Ketoconazole shampoo.    3. Lower back pain. MSK. No radiculopthy. No red flag symptoms. Discussed NSAIDs prn, avoid aggravating activity, and initiate PT program. Orders placed.     Outpatient Encounter Medications as of 2024   Medication Sig Dispense Refill    escitalopram 10 MG Oral Tab Take 1 tablet (10 mg total) by mouth every evening. (Patient not taking: Reported on 2023) 90 tablet 0    ALPRAZolam (XANAX) 0.25 MG Oral Tab Take 1 tablet (0.25 mg total) by mouth nightly as needed for Sleep. (Patient not taking: Reported on 2023) 20 tablet 0     No facility-administered encounter medications on file as of 2024.           Side effects, risks and benefits of medications were explained.  The patient or responsible adult showed the ability to learn, asked appropriate questions.  There were no barriers to learning and they verbalized understanding of the treatment plan.     Medication list provided to patient and /or family member.        Katie Burkett MD

## 2024-02-28 ENCOUNTER — OFFICE VISIT (OUTPATIENT)
Dept: FAMILY MEDICINE CLINIC | Facility: CLINIC | Age: 34
End: 2024-02-28
Payer: COMMERCIAL

## 2024-02-28 VITALS
DIASTOLIC BLOOD PRESSURE: 76 MMHG | OXYGEN SATURATION: 99 % | SYSTOLIC BLOOD PRESSURE: 120 MMHG | HEART RATE: 65 BPM | WEIGHT: 162 LBS | BODY MASS INDEX: 34 KG/M2 | HEIGHT: 58 IN

## 2024-02-28 DIAGNOSIS — G47.00 INSOMNIA, UNSPECIFIED TYPE: Primary | ICD-10-CM

## 2024-02-28 DIAGNOSIS — R20.9 ABNORMAL SENSATION OF UPPER EXTREMITY: ICD-10-CM

## 2024-02-28 PROCEDURE — 99214 OFFICE O/P EST MOD 30 MIN: CPT | Performed by: STUDENT IN AN ORGANIZED HEALTH CARE EDUCATION/TRAINING PROGRAM

## 2024-02-28 PROCEDURE — 3008F BODY MASS INDEX DOCD: CPT | Performed by: STUDENT IN AN ORGANIZED HEALTH CARE EDUCATION/TRAINING PROGRAM

## 2024-02-28 PROCEDURE — 3074F SYST BP LT 130 MM HG: CPT | Performed by: STUDENT IN AN ORGANIZED HEALTH CARE EDUCATION/TRAINING PROGRAM

## 2024-02-28 PROCEDURE — 3078F DIAST BP <80 MM HG: CPT | Performed by: STUDENT IN AN ORGANIZED HEALTH CARE EDUCATION/TRAINING PROGRAM

## 2024-02-28 RX ORDER — TRAZODONE HYDROCHLORIDE 50 MG/1
TABLET ORAL NIGHTLY PRN
Qty: 60 TABLET | Refills: 0 | Status: SHIPPED
Start: 2024-02-28

## 2024-02-28 NOTE — PROGRESS NOTES
[facility]    Chief Complaint   Patient presents with    Physical     Having symptoms of bell's palsy. Had bad headache when had first flare of bells palsy. Some hot flashes and burning sensation in head. Feeling rightsided facfial pain/heaviness        HPI:   Regina Garcias is 34 year old patient presenting for the followin. Facial and hand heaviness/sensation of fullness. Started yesterday evening. She became very concerned because of her history of Demotte Palsy which occurred in 3/2023 and she has had symptoms of facial heaviness since then, though actual facial droop resolved.  Patient reports this was stable until 5 days ago she developed significant headache. She had \"vivid open-eye sleep\"  She noticed yetserday evening heaviness coming back in her cheek and she also felt heaviness in her right hand. She has not noticed facial droop, drooling, eye tearing or irritation of the eye. She has not noticed slurred speech. She has not noticed weakness in the hand, has not had clumsiness or dropped anything. She does note significant life stressors. She has had difficulty coping with stress for many months. Lexapro made her tired but did not help in calming her mind. She has difficulty sleeping prior to any sort of work project. This is disrupting her ability to work and cope with stress. Additionally she has worked with therapy in the past and she has not found this helpful. She does feel today's symptoms are related to significant stress. Rather than starting a med for anxiety (she did not like lexapro or prozac) she would like to focus on sleep, requesting medication to help with this.     PMH:  Patient Active Problem List   Diagnosis    Vitamin D deficiency    Hirsutism     Past Medical History:   Diagnosis Date    Anemia     Mental disorder     anxiety           SH: reviewed     FH: reviewed        ROS: full 10 point review of systems done and otherwise negative.      Healthcare  Maintenance:       PE:  Vital Signs    24 1113   BP: 120/76   Pulse: 65     Wt Readings from Last 3 Encounters:   24 162 lb (73.5 kg)   24 163 lb (73.9 kg)   23 150 lb (68 kg)     Body mass index is 33.86 kg/m².     Physical Exam:  GEN: Well-appearing, NAD, nontoxic  HEENT: NC/AT, PERRL, EOMI, TM without erythema or bulging bilaterally and normal ear canals, no nasal polyps, oropharynx clear without erythema or exudate, MMM  CARD: RRR, no m/r/g  PULM: CTA jaxson  ABD: soft, nt, nd  EXT: No gross deformity  NEURO: no gross deficit, CN II-XII intact, bilateral UE 5/5 strength  PSYCH: normal affect, thought process linear     No visits with results within 4 Week(s) from this visit.   Latest known visit with results is:   Admission on 2023, Discharged on 2023   Component Date Value Ref Range Status    POCT INFLUENZA A 2023 Negative  Negative Final    POCT INFLUENZA B 2023 Negative  Negative Final    Rapid SARS-CoV-2 by PCR 2023 Detected (A)  Not Detected Final        A/P: Regina Garcias is 34 year old presenting for the followin. Face and hand fullness - pt concerned this is indicative of a relapse of Bell's palsy, however symptoms affecting the hand would not be c/w Case's. She has no abnormal neurologic findings on exam to think of TIA or CVA.  Trazodone ordered to help with sleep. Would recommend trying SNRI if she is open to it in the future for stress management. Strict ED precautions for development of neurologic abnormalities.         Outpatient Encounter Medications as of 2024   Medication Sig Dispense Refill    traZODone 50 MG Oral Tab Take 0.5-1 tablets (25-50 mg total) by mouth nightly as needed for Sleep. 60 tablet 0    ketoconazole 2 % External Shampoo Apply 5 to 10 mL to wet scalp, lather, leave on 3 to 5 minutes, and rinse; apply twice weekly for 2 to 4 weeks. 120 mL 0    [DISCONTINUED] escitalopram 10 MG Oral Tab Take 1 tablet (10 mg  total) by mouth every evening. (Patient not taking: Reported on 9/5/2023) 90 tablet 0    ALPRAZolam (XANAX) 0.25 MG Oral Tab Take 1 tablet (0.25 mg total) by mouth nightly as needed for Sleep. (Patient not taking: Reported on 9/5/2023) 20 tablet 0     No facility-administered encounter medications on file as of 2/28/2024.           Side effects, risks and benefits of medications were explained.  The patient or responsible adult showed the ability to learn, asked appropriate questions.  There were no barriers to learning and they verbalized understanding of the treatment plan.     Medication list provided to patient and /or family member.        Katie Burkett MD

## 2024-03-02 ENCOUNTER — TELEPHONE (OUTPATIENT)
Dept: FAMILY MEDICINE CLINIC | Facility: CLINIC | Age: 34
End: 2024-03-02

## 2024-03-02 DIAGNOSIS — Z13.6 SCREENING FOR CARDIOVASCULAR CONDITION: Primary | ICD-10-CM

## 2024-03-02 NOTE — TELEPHONE ENCOUNTER
Please enter lab orders for the patient's upcoming physical appointment.     Physical scheduled:   Your appointments       Date & Time Appointment Department ([place])    Apr 11, 2024  3:20 PM CDT Adult Physical with Katie Burkett MD [facility] ([facility])    PLEASE NOTE - Most insurances allow a Complete Physical once every 366 days. Please schedule accordingly.    Please arrive 15 minutes prior to your scheduled appointment. Please also bring your Insurance card, Photo ID, and your medication bottles or a list of your current medication.    If you no longer require this appointment, please contact your physician office to cancel.              [facility] [address]  [address] [address]  [phone]           Preferred lab: [facility] ([facility])     The patient has been notified to complete fasting labs prior to their physical appointment.

## 2024-04-08 ENCOUNTER — TELEMEDICINE (OUTPATIENT)
Dept: FAMILY MEDICINE CLINIC | Facility: CLINIC | Age: 34
End: 2024-04-08
Payer: COMMERCIAL

## 2024-04-08 DIAGNOSIS — Z31.69 ENCOUNTER FOR PRECONCEPTION CONSULTATION: Primary | ICD-10-CM

## 2024-04-08 DIAGNOSIS — Z31.9 PATIENT DESIRES PREGNANCY: ICD-10-CM

## 2024-04-08 PROCEDURE — 99213 OFFICE O/P EST LOW 20 MIN: CPT | Performed by: STUDENT IN AN ORGANIZED HEALTH CARE EDUCATION/TRAINING PROGRAM

## 2024-04-08 RX ORDER — MULTIVIT-MIN/IRON/FOLIC ACID/K 18-600-40
1 CAPSULE ORAL DAILY
Qty: 30 CAPSULE | Refills: 0 | Status: SHIPPED | OUTPATIENT
Start: 2024-04-08

## 2024-04-10 ENCOUNTER — LAB ENCOUNTER (OUTPATIENT)
Dept: LAB | Age: 34
End: 2024-04-10
Attending: STUDENT IN AN ORGANIZED HEALTH CARE EDUCATION/TRAINING PROGRAM
Payer: COMMERCIAL

## 2024-04-10 DIAGNOSIS — R73.01 ELEVATED FASTING GLUCOSE: ICD-10-CM

## 2024-04-10 DIAGNOSIS — Z13.6 SCREENING FOR CARDIOVASCULAR CONDITION: ICD-10-CM

## 2024-04-10 LAB
ALBUMIN SERPL-MCNC: 3.6 G/DL (ref 3.4–5)
ALBUMIN/GLOB SERPL: 0.9 {RATIO} (ref 1–2)
ALP LIVER SERPL-CCNC: 94 U/L
ALT SERPL-CCNC: 84 U/L
ANION GAP SERPL CALC-SCNC: 3 MMOL/L (ref 0–18)
AST SERPL-CCNC: 34 U/L (ref 15–37)
BASOPHILS # BLD AUTO: 0.11 X10(3) UL (ref 0–0.2)
BASOPHILS NFR BLD AUTO: 1.1 %
BILIRUB SERPL-MCNC: 0.9 MG/DL (ref 0.1–2)
BUN BLD-MCNC: 8 MG/DL (ref 9–23)
CALCIUM BLD-MCNC: 9.2 MG/DL (ref 8.5–10.1)
CHLORIDE SERPL-SCNC: 108 MMOL/L (ref 98–112)
CHOLEST SERPL-MCNC: 161 MG/DL (ref ?–200)
CO2 SERPL-SCNC: 26 MMOL/L (ref 21–32)
CREAT BLD-MCNC: 1 MG/DL
EGFRCR SERPLBLD CKD-EPI 2021: 76 ML/MIN/1.73M2 (ref 60–?)
EOSINOPHIL # BLD AUTO: 0.51 X10(3) UL (ref 0–0.7)
EOSINOPHIL NFR BLD AUTO: 5.3 %
ERYTHROCYTE [DISTWIDTH] IN BLOOD BY AUTOMATED COUNT: 13.2 %
FASTING PATIENT LIPID ANSWER: YES
FASTING STATUS PATIENT QL REPORTED: YES
GLOBULIN PLAS-MCNC: 3.8 G/DL (ref 2.8–4.4)
GLUCOSE BLD-MCNC: 121 MG/DL (ref 70–99)
HCT VFR BLD AUTO: 40.7 %
HDLC SERPL-MCNC: 40 MG/DL (ref 40–59)
HGB BLD-MCNC: 13.2 G/DL
IMM GRANULOCYTES # BLD AUTO: 0.06 X10(3) UL (ref 0–1)
IMM GRANULOCYTES NFR BLD: 0.6 %
LDLC SERPL CALC-MCNC: 104 MG/DL (ref ?–100)
LYMPHOCYTES # BLD AUTO: 3.21 X10(3) UL (ref 1–4)
LYMPHOCYTES NFR BLD AUTO: 33.3 %
MCH RBC QN AUTO: 29.3 PG (ref 26–34)
MCHC RBC AUTO-ENTMCNC: 32.4 G/DL (ref 31–37)
MCV RBC AUTO: 90.4 FL
MONOCYTES # BLD AUTO: 0.8 X10(3) UL (ref 0.1–1)
MONOCYTES NFR BLD AUTO: 8.3 %
NEUTROPHILS # BLD AUTO: 4.94 X10 (3) UL (ref 1.5–7.7)
NEUTROPHILS # BLD AUTO: 4.94 X10(3) UL (ref 1.5–7.7)
NEUTROPHILS NFR BLD AUTO: 51.4 %
NONHDLC SERPL-MCNC: 121 MG/DL (ref ?–130)
OSMOLALITY SERPL CALC.SUM OF ELEC: 284 MOSM/KG (ref 275–295)
PLATELET # BLD AUTO: 270 10(3)UL (ref 150–450)
POTASSIUM SERPL-SCNC: 4.2 MMOL/L (ref 3.5–5.1)
PROT SERPL-MCNC: 7.4 G/DL (ref 6.4–8.2)
RBC # BLD AUTO: 4.5 X10(6)UL
SODIUM SERPL-SCNC: 137 MMOL/L (ref 136–145)
TRIGL SERPL-MCNC: 93 MG/DL (ref 30–149)
VLDLC SERPL CALC-MCNC: 16 MG/DL (ref 0–30)
WBC # BLD AUTO: 9.6 X10(3) UL (ref 4–11)

## 2024-04-10 PROCEDURE — 85025 COMPLETE CBC W/AUTO DIFF WBC: CPT

## 2024-04-10 PROCEDURE — 80053 COMPREHEN METABOLIC PANEL: CPT

## 2024-04-10 PROCEDURE — 83036 HEMOGLOBIN GLYCOSYLATED A1C: CPT

## 2024-04-10 PROCEDURE — 80061 LIPID PANEL: CPT

## 2024-04-11 ENCOUNTER — OFFICE VISIT (OUTPATIENT)
Dept: FAMILY MEDICINE CLINIC | Facility: CLINIC | Age: 34
End: 2024-04-11
Payer: COMMERCIAL

## 2024-04-11 VITALS
WEIGHT: 162 LBS | DIASTOLIC BLOOD PRESSURE: 64 MMHG | BODY MASS INDEX: 34 KG/M2 | OXYGEN SATURATION: 98 % | SYSTOLIC BLOOD PRESSURE: 100 MMHG | HEIGHT: 58 IN | HEART RATE: 91 BPM

## 2024-04-11 DIAGNOSIS — R79.89 ELEVATED LFTS: ICD-10-CM

## 2024-04-11 DIAGNOSIS — Z12.4 SCREENING FOR CERVICAL CANCER: ICD-10-CM

## 2024-04-11 DIAGNOSIS — R73.01 ELEVATED FASTING GLUCOSE: ICD-10-CM

## 2024-04-11 DIAGNOSIS — Z00.00 ENCOUNTER FOR ROUTINE HISTORY AND PHYSICAL EXAMINATION: Primary | ICD-10-CM

## 2024-04-11 LAB
EST. AVERAGE GLUCOSE BLD GHB EST-MCNC: 120 MG/DL (ref 68–126)
HBA1C MFR BLD: 5.8 % (ref ?–5.7)

## 2024-04-11 PROCEDURE — 87624 HPV HI-RISK TYP POOLED RSLT: CPT | Performed by: STUDENT IN AN ORGANIZED HEALTH CARE EDUCATION/TRAINING PROGRAM

## 2024-04-11 PROCEDURE — 88175 CYTOPATH C/V AUTO FLUID REDO: CPT | Performed by: STUDENT IN AN ORGANIZED HEALTH CARE EDUCATION/TRAINING PROGRAM

## 2024-04-11 RX ORDER — KETOCONAZOLE 20 MG/ML
SHAMPOO TOPICAL
Qty: 120 ML | Refills: 0 | Status: CANCELLED | OUTPATIENT
Start: 2024-04-11

## 2024-04-11 NOTE — PROGRESS NOTES
[facility] [place]    CC: yearly exam     HPI: Regina aGrcias is 34 year old female here for a yearly physical.  1. Healthcare maintenance.  Exercise - nothing right now, she is motivated to start.  Sunscreen -.  Caffeine - 1 cup per day.  Advanced directives- not yet     PMH:  Patient Active Problem List   Diagnosis    Vitamin D deficiency    Hirsutism     Past Medical History:    Anemia    Mental disorder    anxiety        Last Physical 4/11/24     SH: reviewed     FH: reviewed     Social History     Social History Narrative    Balanced diet        ROS: full 10 point review of systems done and otherwise negative.      Healthcare Maintenance:  Health Maintenance   Topic Date Due    DTaP,Tdap,and Td Vaccines (3 - Td or Tdap) 04/13/2021    Annual Physical  08/29/2023    COVID-19 Vaccine (3 - 2023-24 season) 09/01/2023    Pap Smear  04/08/2024    Influenza Vaccine (Season Ended) 10/01/2024    Annual Depression Screening  Completed    Pneumococcal Vaccine: Birth to 64yrs  Aged Out       Health Habits:  Smoking. denies  Alcohol Use. denies  Dental Exam. UTD  Eye Exam. UTD       GYN:  Birth Control. None/desires pregnancy     PE:  Vital Signs    04/11/24 1537   BP: 100/64   Pulse: 91     Wt Readings from Last 3 Encounters:   04/11/24 162 lb (73.5 kg)   02/28/24 162 lb (73.5 kg)   02/08/24 163 lb (73.9 kg)     Body mass index is 33.86 kg/m².     General: Comfortable, pleasant, NAD, appears stated age  HEENT.  NC/AT, no conjunctival injection, TMs clear, oropharynx without erythema or exudate, neck supple, no LAD or thyromegaly  CV.  RRR, no m/r/g  Pulm. CTAB, no W/R/R, comfortable work of breathing, speaks in full sentences  Abdomen. Soft, nt, nd  .  Normal external and internal genitalia, bimanual exam without masses  Extremities.  2+ DP pulses, no edema  Skin.  No concerning moles      [facility] Lab Encounter on 04/10/2024   Component Date Value Ref Range Status    Glucose 04/10/2024 121 (H)  70 - 99  mg/dL Final    Sodium 04/10/2024 137  136 - 145 mmol/L Final    Potassium 04/10/2024 4.2  3.5 - 5.1 mmol/L Final    Chloride 04/10/2024 108  98 - 112 mmol/L Final    CO2 04/10/2024 26.0  21.0 - 32.0 mmol/L Final    Anion Gap 04/10/2024 3  0 - 18 mmol/L Final    BUN 04/10/2024 8 (L)  9 - 23 mg/dL Final    Creatinine 04/10/2024 1.00  0.55 - 1.02 mg/dL Final    Calcium, Total 04/10/2024 9.2  8.5 - 10.1 mg/dL Final    Calculated Osmolality 04/10/2024 284  275 - 295 mOsm/kg Final    eGFR-Cr 04/10/2024 76  >=60 mL/min/1.73m2 Final    AST 04/10/2024 34  15 - 37 U/L Final    ALT 04/10/2024 84 (H)  13 - 56 U/L Final    Alkaline Phosphatase 04/10/2024 94  37 - 98 U/L Final    Bilirubin, Total 04/10/2024 0.9  0.1 - 2.0 mg/dL Final    Total Protein 04/10/2024 7.4  6.4 - 8.2 g/dL Final    Albumin 04/10/2024 3.6  3.4 - 5.0 g/dL Final    Globulin  04/10/2024 3.8  2.8 - 4.4 g/dL Final    A/G Ratio 04/10/2024 0.9 (L)  1.0 - 2.0 Final    Patient Fasting for CMP? 04/10/2024 Yes   Final    Cholesterol, Total 04/10/2024 161  <200 mg/dL Final    HDL Cholesterol 04/10/2024 40  40 - 59 mg/dL Final    Triglycerides 04/10/2024 93  30 - 149 mg/dL Final    LDL Cholesterol 04/10/2024 104 (H)  <100 mg/dL Final    VLDL 04/10/2024 16  0 - 30 mg/dL Final    Non HDL Chol 04/10/2024 121  <130 mg/dL Final    Patient Fasting for Lipid? 04/10/2024 Yes   Final    WBC 04/10/2024 9.6  4.0 - 11.0 x10(3) uL Final    RBC 04/10/2024 4.50  3.80 - 5.30 x10(6)uL Final    HGB 04/10/2024 13.2  12.0 - 16.0 g/dL Final    HCT 04/10/2024 40.7  35.0 - 48.0 % Final    PLT 04/10/2024 270.0  150.0 - 450.0 10(3)uL Final    MCV 04/10/2024 90.4  80.0 - 100.0 fL Final    MCH 04/10/2024 29.3  26.0 - 34.0 pg Final    MCHC 04/10/2024 32.4  31.0 - 37.0 g/dL Final    RDW 04/10/2024 13.2  % Final    Neutrophil Absolute Prelim 04/10/2024 4.94  1.50 - 7.70 x10 (3) uL Final    Neutrophil Absolute 04/10/2024 4.94  1.50 - 7.70 x10(3) uL Final    Lymphocyte Absolute 04/10/2024 3.21  1.00  - 4.00 x10(3) uL Final    Monocyte Absolute 04/10/2024 0.80  0.10 - 1.00 x10(3) uL Final    Eosinophil Absolute 04/10/2024 0.51  0.00 - 0.70 x10(3) uL Final    Basophil Absolute 04/10/2024 0.11  0.00 - 0.20 x10(3) uL Final    Immature Granulocyte Absolute 04/10/2024 0.06  0.00 - 1.00 x10(3) uL Final    Neutrophil % 04/10/2024 51.4  % Final    Lymphocyte % 04/10/2024 33.3  % Final    Monocyte % 04/10/2024 8.3  % Final    Eosinophil % 04/10/2024 5.3  % Final    Basophil % 04/10/2024 1.1  % Final    Immature Granulocyte % 04/10/2024 0.6  % Final        A/P: Regina Garcias is 34 year old yo female here for complete physical.  1. Healthcare Maintenance. Discussed eye exam, dentist visit q6 months, sunscreen, exercise at least 5x/week, 30 minutes daily, and limiting caffeine intake. Labs reviewed. Pap completed.  2. Elevated ALT noted on prelabs. Recommend stop tylenol, avoid any alcohol, recheck 4-6 weeks. She feels she may have had some fatty liver in the past.  3. Elevated fasting glucose 121 - A1c added to prior blood draw.       Outpatient Encounter Medications as of 4/11/2024   Medication Sig Dispense Refill    Prenatal MV-Min-Fe Fum-FA-DHA (PRENATAL/FOLIC ACID+DHA) 27-0.8-200 MG Oral Cap Take 1 tablet by mouth daily. 30 capsule 0    traZODone 50 MG Oral Tab Take 0.5-1 tablets (25-50 mg total) by mouth nightly as needed for Sleep. 60 tablet 0    ketoconazole 2 % External Shampoo Apply 5 to 10 mL to wet scalp, lather, leave on 3 to 5 minutes, and rinse; apply twice weekly for 2 to 4 weeks. 120 mL 0    ALPRAZolam (XANAX) 0.25 MG Oral Tab Take 1 tablet (0.25 mg total) by mouth nightly as needed for Sleep. 20 tablet 0     No facility-administered encounter medications on file as of 4/11/2024.           Side effects, risks and benefits of medications were explained.  The patient or responsible adult showed the ability to learn, asked appropriate questions.  There were no barriers to learning and they verbalized  understanding of the treatment plan.     Medication list provided to patient and /or family member.     This note was created in part by Dragon recognition software.  Please excuse errors.

## 2024-04-11 NOTE — PROGRESS NOTES
Telemedicine video encounter documentation    This video encounter was conducted with the patient's (or proxy's) verbal consent via secure, interactive audio and video telecommunications.      The patient (or proxy) was instructed to have this encounter in a suitably private space and to only have persons present to whom they give permission to participate. In addition, patient identity was confirmed by use of name plus two identifiers.      Chief Complaint:     PNC    Subjective:     Regina Garcias is a 34 year old female established patient. Video visit today for:    1. Desires fertility. Patient would like to start considering pregnancy. Requests prenatal vitamin. Has 4yo.         Patient Active Problem List   Diagnosis    Vitamin D deficiency    Hirsutism       Outpatient Medications Prior to Visit   Medication Sig Dispense Refill    traZODone 50 MG Oral Tab Take 0.5-1 tablets (25-50 mg total) by mouth nightly as needed for Sleep. 60 tablet 0    ketoconazole 2 % External Shampoo Apply 5 to 10 mL to wet scalp, lather, leave on 3 to 5 minutes, and rinse; apply twice weekly for 2 to 4 weeks. 120 mL 0    ALPRAZolam (XANAX) 0.25 MG Oral Tab Take 1 tablet (0.25 mg total) by mouth nightly as needed for Sleep. (Patient not taking: Reported on 9/5/2023) 20 tablet 0     No facility-administered medications prior to visit.       Social History and Allergies reviewed.    ROS   See HPI for other ROS    Objective:     Vitals as reported by patient:    Constitutional: well-apearing  Mental status: alert and oriented  Respiratory: no labored breathing, speaks in full sentences      Assessment/Plan:     Diagnoses and all orders for this visit:    Encounter for preconception consultation    Patient desires pregnancy  -     Prenatal MV-Min-Fe Fum-FA-DHA (PRENATAL/FOLIC ACID+DHA) 27-0.8-200 MG Oral Cap; Take 1 tablet by mouth daily.      Brief PNC, prenatal ordered. Prelabs for physical also discussed and  ordered.    [facility]    No follow-ups on file.      There are no Patient Instructions on file for this visit.

## 2024-04-12 LAB — HPV I/H RISK 1 DNA SPEC QL NAA+PROBE: NEGATIVE

## 2024-04-17 LAB
.: NORMAL
.: NORMAL

## 2024-07-27 ENCOUNTER — PATIENT MESSAGE (OUTPATIENT)
Dept: FAMILY MEDICINE CLINIC | Facility: CLINIC | Age: 34
End: 2024-07-27

## 2024-07-29 NOTE — TELEPHONE ENCOUNTER
From: Regina Garcias  To: Katie Burkett  Sent: 7/27/2024 2:51 PM CDT  Subject: Pregnancy    Hello Dr Burkett,    I hope you are well. I did a pregnancy test today, and it is positive. According to my calculations, I might be at week 5 but am not confident. I was wondering if I could see you sooner (coming week M-W) as I have a major internal trip in 2 weeks, and want to discuss some details before my trip.     Please let me know,     Thanks,  Regina Garcias

## 2024-07-31 ENCOUNTER — OFFICE VISIT (OUTPATIENT)
Dept: FAMILY MEDICINE CLINIC | Facility: CLINIC | Age: 34
End: 2024-07-31
Payer: COMMERCIAL

## 2024-07-31 VITALS
OXYGEN SATURATION: 99 % | SYSTOLIC BLOOD PRESSURE: 104 MMHG | HEART RATE: 95 BPM | DIASTOLIC BLOOD PRESSURE: 60 MMHG | BODY MASS INDEX: 33.8 KG/M2 | WEIGHT: 161 LBS | HEIGHT: 58 IN

## 2024-07-31 DIAGNOSIS — Z3A.12 12 WEEKS GESTATION OF PREGNANCY (HCC): Primary | ICD-10-CM

## 2024-07-31 DIAGNOSIS — N92.6 MISSED MENSES: ICD-10-CM

## 2024-07-31 DIAGNOSIS — O36.80X0 CONFIRM FETAL VIABILITY WITH HISTORY OF MISCARRIAGE, ULTRASOUND (HCC): Primary | ICD-10-CM

## 2024-07-31 DIAGNOSIS — N89.8 VAGINAL ITCHING: ICD-10-CM

## 2024-07-31 DIAGNOSIS — Z87.59 CONFIRM FETAL VIABILITY WITH HISTORY OF MISCARRIAGE, ULTRASOUND (HCC): Primary | ICD-10-CM

## 2024-07-31 LAB
CONTROL LINE PRESENT WITH A CLEAR BACKGROUND (YES/NO): YES YES/NO
KIT LOT #: NORMAL NUMERIC
PREGNANCY TEST, URINE: POSITIVE

## 2024-07-31 PROCEDURE — 81025 URINE PREGNANCY TEST: CPT | Performed by: STUDENT IN AN ORGANIZED HEALTH CARE EDUCATION/TRAINING PROGRAM

## 2024-07-31 PROCEDURE — 3008F BODY MASS INDEX DOCD: CPT | Performed by: STUDENT IN AN ORGANIZED HEALTH CARE EDUCATION/TRAINING PROGRAM

## 2024-07-31 PROCEDURE — 99214 OFFICE O/P EST MOD 30 MIN: CPT | Performed by: STUDENT IN AN ORGANIZED HEALTH CARE EDUCATION/TRAINING PROGRAM

## 2024-07-31 PROCEDURE — 3078F DIAST BP <80 MM HG: CPT | Performed by: STUDENT IN AN ORGANIZED HEALTH CARE EDUCATION/TRAINING PROGRAM

## 2024-07-31 PROCEDURE — 81514 NFCT DS BV&VAGINITIS DNA ALG: CPT | Performed by: STUDENT IN AN ORGANIZED HEALTH CARE EDUCATION/TRAINING PROGRAM

## 2024-07-31 PROCEDURE — 3074F SYST BP LT 130 MM HG: CPT | Performed by: STUDENT IN AN ORGANIZED HEALTH CARE EDUCATION/TRAINING PROGRAM

## 2024-07-31 NOTE — PROGRESS NOTES
[facility]    Chief Complaint   Patient presents with    Follow - Up     PREGNANT, WANTING TO GET REFERRED. HAVING SOME VAGINAL ITCHING        HPI:   Regina Garcias is 34 year old patient presenting for the followin. Missed period/pregnancy. Last recorded menses started on . This dates her at 12 weeks 1 day. She is not entirely sure that this is correct. Taking prenatal. Avoiding undercooked meats. Staying hydrated. Has had some breast tenderness, not endorsing nausea or vomiting. Denies cramping and bleeding, no abdl pain.    2.  Vaginal dryness and itchiness. Not endorsing discharge. Tried OTC yeast infection medication without improvement a few weeks ago. Not concerned about STI.       PMH:  Patient Active Problem List   Diagnosis    Vitamin D deficiency    Hirsutism     Past Medical History:    Anemia    Mental disorder    anxiety           SH: reviewed     FH: reviewed        ROS: full 10 point review of systems done and otherwise negative.      Healthcare Maintenance:  Health Maintenance   Topic Date Due    DTaP,Tdap,and Td Vaccines (3 - Td or Tdap) 2021    COVID-19 Vaccine (3 - - season) 2023    Influenza Vaccine (1) 10/01/2024    Annual Physical  2025    Pap Smear  2027    Annual Depression Screening  Completed    Pneumococcal Vaccine: Birth to 64yrs  Aged Out          PE:  Vital Signs    24 1144   BP: 104/60   Pulse: 95   PainSc: 0 - (None)     Wt Readings from Last 3 Encounters:   24 161 lb (73 kg)   24 162 lb (73.5 kg)   24 162 lb (73.5 kg)     Body mass index is 33.65 kg/m².     Physical Exam:  GEN: Well-appearing, NAD, nontoxic  CARD: Regular rate  PULM: Comfortable WOB  ABD:nd   EXT: No gross deformity  NEURO: no gross deficit  > Normal external female genitalia. Normal vaginal mucosa. Cervix high and closed. No discharge.  PSYCH: normal affect, thought process linear     No visits with results within 4  Week(s) from this visit.   Latest known visit with results is:   Office Visit on 2024   Component Date Value Ref Range Status    HPV High Risk 2024 Negative  Negative Final    HPV Source 2024 Cervical/endocervical   Final    Diagnosis:  2024 NEGATIVE FOR INTRAEPITHELIAL LESION OR MALIGNANCY.   Final    Specimen Adequacy: 2024 Comment   Final    Performed By: 2024 Comment   Final    . 2024 .   Final    Note: 2024 Comment   Final    Test Methodology: 2024 Comment   Final    Clinician provided ICD10: 2024 Comment   Final        A/P: Regina Garcias is 34 year old presenting for the followin. 1. Missed menses  - Urine Preg Test    2. 12 weeks gestation of pregnancy ([facility])  - OBG - INTERNAL    3. Vaginal itching - vaginosis swab completed though no discharge, normal exam  - Vaginitis Vaginosis PCR Panel; Future  - Vaginitis Vaginosis PCR Panel          Outpatient Encounter Medications as of 2024   Medication Sig Dispense Refill    Prenatal MV-Min-Fe Fum-FA-DHA (PRENATAL/FOLIC ACID+DHA) 27-0.8-200 MG Oral Cap Take 1 tablet by mouth daily. 30 capsule 0    traZODone 50 MG Oral Tab Take 0.5-1 tablets (25-50 mg total) by mouth nightly as needed for Sleep. (Patient not taking: Reported on 2024) 60 tablet 0    ketoconazole 2 % External Shampoo Apply 5 to 10 mL to wet scalp, lather, leave on 3 to 5 minutes, and rinse; apply twice weekly for 2 to 4 weeks. (Patient not taking: Reported on 2024) 120 mL 0    ALPRAZolam (XANAX) 0.25 MG Oral Tab Take 1 tablet (0.25 mg total) by mouth nightly as needed for Sleep. (Patient not taking: Reported on 2024) 20 tablet 0     No facility-administered encounter medications on file as of 2024.       Side effects, risks and benefits of medications were explained.  The patient or responsible adult showed the ability to learn, asked appropriate questions.  There were no barriers to learning and they  verbalized understanding of the treatment plan.     Medication list provided to patient and /or family member.  I spent a total of 30 minutes in both face-to-face and non-face-to-face activities for this visit on the date of this encounter.      Katie Burkett MD

## 2024-08-01 LAB
BV BACTERIA DNA VAG QL NAA+PROBE: NEGATIVE
C GLABRATA DNA VAG QL NAA+PROBE: NEGATIVE
C KRUSEI DNA VAG QL NAA+PROBE: NEGATIVE
CANDIDA DNA VAG QL NAA+PROBE: NEGATIVE
T VAGINALIS DNA VAG QL NAA+PROBE: NEGATIVE

## 2024-08-12 ENCOUNTER — ULTRASOUND ENCOUNTER (OUTPATIENT)
Facility: CLINIC | Age: 34
End: 2024-08-12
Payer: COMMERCIAL

## 2024-08-12 ENCOUNTER — INITIAL PRENATAL (OUTPATIENT)
Dept: OBGYN CLINIC | Facility: CLINIC | Age: 34
End: 2024-08-12
Payer: COMMERCIAL

## 2024-08-12 VITALS
BODY MASS INDEX: 34.38 KG/M2 | HEART RATE: 69 BPM | WEIGHT: 163.81 LBS | HEIGHT: 58 IN | DIASTOLIC BLOOD PRESSURE: 62 MMHG | SYSTOLIC BLOOD PRESSURE: 96 MMHG

## 2024-08-12 DIAGNOSIS — Z3A.08 8 WEEKS GESTATION OF PREGNANCY (HCC): ICD-10-CM

## 2024-08-12 DIAGNOSIS — O99.210 OBESITY AFFECTING PREGNANCY, ANTEPARTUM, UNSPECIFIED OBESITY TYPE (HCC): Primary | ICD-10-CM

## 2024-08-12 LAB
APPEARANCE: CLEAR
BILIRUBIN: NEGATIVE
GLUCOSE (URINE DIPSTICK): NEGATIVE MG/DL
KETONES (URINE DIPSTICK): NEGATIVE MG/DL
LEUKOCYTES: NEGATIVE
MULTISTIX LOT#: NORMAL NUMERIC
NITRITE, URINE: NEGATIVE
OCCULT BLOOD: NEGATIVE
PH, URINE: 7 (ref 4.5–8)
PROTEIN (URINE DIPSTICK): NEGATIVE MG/DL
SPECIFIC GRAVITY: 1.01 (ref 1–1.03)
URINE-COLOR: YELLOW
UROBILINOGEN,SEMI-QN: 0.2 MG/DL (ref 0–1.9)

## 2024-08-12 PROCEDURE — 87591 N.GONORRHOEAE DNA AMP PROB: CPT

## 2024-08-12 PROCEDURE — 87491 CHLMYD TRACH DNA AMP PROBE: CPT

## 2024-08-12 PROCEDURE — 87086 URINE CULTURE/COLONY COUNT: CPT

## 2024-08-12 NOTE — PROGRESS NOTES
Initial OB - 8w0d     KEIRY by 8w0d US - periods are irregular     OBhx -   PMHx - . Denies blood transfusion, HIV, hepatitis, HSV, VTE, congenital heart defects   Psurghx - denies  Last pap 2024 -NILM, HPV neg     SHE DOES NOT WANT ANY MALE PROVIDERS.    34 year old , EDC by 8w0d US   -NIPT & Carrier discussed - will consider, ask at next visit       Obesity (pre preg BMI 33)   -consider L2US, growths, NSTs per MFM recommendations   -A1c, CMP added to PNL   -limit wt gain 11-20 lbs     AMA at time of delivery  - see  testing above     H/o oligohydramnios   -IOL at 41 wks for low fluid   -she was scared of IOL, wanted to wait as long as possible.

## 2024-08-13 LAB
C TRACH DNA SPEC QL NAA+PROBE: NEGATIVE
N GONORRHOEA DNA SPEC QL NAA+PROBE: NEGATIVE

## 2024-08-21 ENCOUNTER — LAB ENCOUNTER (OUTPATIENT)
Dept: LAB | Age: 34
End: 2024-08-21
Payer: COMMERCIAL

## 2024-08-21 DIAGNOSIS — O99.210 OBESITY AFFECTING PREGNANCY, ANTEPARTUM, UNSPECIFIED OBESITY TYPE (HCC): ICD-10-CM

## 2024-08-21 DIAGNOSIS — Z3A.08 8 WEEKS GESTATION OF PREGNANCY (HCC): ICD-10-CM

## 2024-08-21 LAB
ALBUMIN SERPL-MCNC: 4.4 G/DL (ref 3.2–4.8)
ALBUMIN/GLOB SERPL: 1.6 {RATIO} (ref 1–2)
ALP LIVER SERPL-CCNC: 86 U/L
ALT SERPL-CCNC: 22 U/L
ANION GAP SERPL CALC-SCNC: 5 MMOL/L (ref 0–18)
ANTIBODY SCREEN: NEGATIVE
AST SERPL-CCNC: 16 U/L (ref ?–34)
BASOPHILS # BLD AUTO: 0.07 X10(3) UL (ref 0–0.2)
BASOPHILS NFR BLD AUTO: 0.6 %
BILIRUB SERPL-MCNC: 0.7 MG/DL (ref 0.3–1.2)
BUN BLD-MCNC: 6 MG/DL (ref 9–23)
CALCIUM BLD-MCNC: 9.6 MG/DL (ref 8.7–10.4)
CHLORIDE SERPL-SCNC: 106 MMOL/L (ref 98–112)
CO2 SERPL-SCNC: 24 MMOL/L (ref 21–32)
CREAT BLD-MCNC: 0.62 MG/DL
DEPRECATED HBV CORE AB SER IA-ACNC: 55.4 NG/ML
EGFRCR SERPLBLD CKD-EPI 2021: 120 ML/MIN/1.73M2 (ref 60–?)
EOSINOPHIL # BLD AUTO: 0.27 X10(3) UL (ref 0–0.7)
EOSINOPHIL NFR BLD AUTO: 2.2 %
ERYTHROCYTE [DISTWIDTH] IN BLOOD BY AUTOMATED COUNT: 14.1 %
EST. AVERAGE GLUCOSE BLD GHB EST-MCNC: 114 MG/DL (ref 68–126)
FASTING STATUS PATIENT QL REPORTED: YES
GLOBULIN PLAS-MCNC: 2.7 G/DL (ref 2–3.5)
GLUCOSE BLD-MCNC: 90 MG/DL (ref 70–99)
HBA1C MFR BLD: 5.6 % (ref ?–5.7)
HBV SURFACE AG SER-ACNC: <0.1 [IU]/L
HBV SURFACE AG SERPL QL IA: NONREACTIVE
HCT VFR BLD AUTO: 39.3 %
HCV AB SERPL QL IA: NONREACTIVE
HGB BLD-MCNC: 12.9 G/DL
IMM GRANULOCYTES # BLD AUTO: 0.06 X10(3) UL (ref 0–1)
IMM GRANULOCYTES NFR BLD: 0.5 %
LYMPHOCYTES # BLD AUTO: 2.77 X10(3) UL (ref 1–4)
LYMPHOCYTES NFR BLD AUTO: 22.6 %
MCH RBC QN AUTO: 29.5 PG (ref 26–34)
MCHC RBC AUTO-ENTMCNC: 32.8 G/DL (ref 31–37)
MCV RBC AUTO: 89.7 FL
MONOCYTES # BLD AUTO: 0.8 X10(3) UL (ref 0.1–1)
MONOCYTES NFR BLD AUTO: 6.5 %
NEUTROPHILS # BLD AUTO: 8.27 X10 (3) UL (ref 1.5–7.7)
NEUTROPHILS # BLD AUTO: 8.27 X10(3) UL (ref 1.5–7.7)
NEUTROPHILS NFR BLD AUTO: 67.6 %
OSMOLALITY SERPL CALC.SUM OF ELEC: 277 MOSM/KG (ref 275–295)
PLATELET # BLD AUTO: 264 10(3)UL (ref 150–450)
POTASSIUM SERPL-SCNC: 3.9 MMOL/L (ref 3.5–5.1)
PROT SERPL-MCNC: 7.1 G/DL (ref 5.7–8.2)
RBC # BLD AUTO: 4.38 X10(6)UL
RH BLOOD TYPE: POSITIVE
RUBV IGG SER QL: POSITIVE
RUBV IGG SER-ACNC: 63 IU/ML (ref 10–?)
SODIUM SERPL-SCNC: 135 MMOL/L (ref 136–145)
T PALLIDUM AB SER QL IA: NONREACTIVE
WBC # BLD AUTO: 12.2 X10(3) UL (ref 4–11)

## 2024-08-21 PROCEDURE — 86850 RBC ANTIBODY SCREEN: CPT

## 2024-08-21 PROCEDURE — 87340 HEPATITIS B SURFACE AG IA: CPT

## 2024-08-21 PROCEDURE — 83036 HEMOGLOBIN GLYCOSYLATED A1C: CPT

## 2024-08-21 PROCEDURE — 86901 BLOOD TYPING SEROLOGIC RH(D): CPT

## 2024-08-21 PROCEDURE — 86780 TREPONEMA PALLIDUM: CPT

## 2024-08-21 PROCEDURE — 80053 COMPREHEN METABOLIC PANEL: CPT

## 2024-08-21 PROCEDURE — 85025 COMPLETE CBC W/AUTO DIFF WBC: CPT

## 2024-08-21 PROCEDURE — 86762 RUBELLA ANTIBODY: CPT

## 2024-08-21 PROCEDURE — 82728 ASSAY OF FERRITIN: CPT

## 2024-08-21 PROCEDURE — 86900 BLOOD TYPING SEROLOGIC ABO: CPT

## 2024-08-21 PROCEDURE — 86803 HEPATITIS C AB TEST: CPT

## 2024-08-21 PROCEDURE — 87389 HIV-1 AG W/HIV-1&-2 AB AG IA: CPT

## 2024-09-24 NOTE — PROGRESS NOTES
PETEY - 14w4d     Was in [place] for the last month on vacation. Did get sick while there: cough, chest congestion, poor appetite. Saw gyn and they thought it could be PNA with fluid on lungs? Did end up getting abx and now better. Lost 5 lbs due to minimal appetite. Appetite now back. Came back last night. Denies abdominal/pelvic pain or bleeding.     We also had a long discussion about vaginal delivery vs PCS. Pt reports she had a very traumatic experience during labor and suffered from postpartum depression for 2 years. She felt like because she was induced, she did not have control over anything and this was overwhelming. She is considering having a PCS. We discussed that PCS is a major abdominal surgery and comes with its own risks, however if the birth was traumatic and she does not want to have a vaginal delivery because of that, that her mental health is also  very important and we can consider an elective . Pt voiced understanding and will think about it as the pregnancy progresses    SHE DOES NOT WANT ANY MALE PROVIDERS.    34 year old , EDC by 8w0d US   - NIPT & Carrier discussed- declined  - msAFP: declined    Obesity (pre preg BMI 33)   -consider L2US, growths, NSTs per MFM recommendations - consult ordered  -A1c: 5.6 on , CMP normal   -limit wt gain 11-20 lbs     AMA at time of delivery  - see  testing above     H/o oligohydramnios   -IOL at 41 wks for low fluid   -she was scared of IOL, wanted to wait as long as possible.     RTC 4 weeks

## 2024-09-27 ENCOUNTER — ROUTINE PRENATAL (OUTPATIENT)
Facility: CLINIC | Age: 34
End: 2024-09-27
Payer: COMMERCIAL

## 2024-09-27 VITALS
SYSTOLIC BLOOD PRESSURE: 100 MMHG | WEIGHT: 154.19 LBS | HEART RATE: 73 BPM | DIASTOLIC BLOOD PRESSURE: 70 MMHG | BODY MASS INDEX: 32.37 KG/M2 | HEIGHT: 58 IN

## 2024-09-27 DIAGNOSIS — O09.522 MULTIGRAVIDA OF ADVANCED MATERNAL AGE IN SECOND TRIMESTER (HCC): ICD-10-CM

## 2024-09-27 DIAGNOSIS — Z3A.14 14 WEEKS GESTATION OF PREGNANCY (HCC): Primary | ICD-10-CM

## 2024-09-27 PROCEDURE — 3074F SYST BP LT 130 MM HG: CPT | Performed by: STUDENT IN AN ORGANIZED HEALTH CARE EDUCATION/TRAINING PROGRAM

## 2024-09-27 PROCEDURE — 3078F DIAST BP <80 MM HG: CPT | Performed by: STUDENT IN AN ORGANIZED HEALTH CARE EDUCATION/TRAINING PROGRAM

## 2024-09-27 PROCEDURE — 3008F BODY MASS INDEX DOCD: CPT | Performed by: STUDENT IN AN ORGANIZED HEALTH CARE EDUCATION/TRAINING PROGRAM

## 2024-10-22 ENCOUNTER — ROUTINE PRENATAL (OUTPATIENT)
Facility: CLINIC | Age: 34
End: 2024-10-22
Payer: COMMERCIAL

## 2024-10-22 VITALS
SYSTOLIC BLOOD PRESSURE: 116 MMHG | BODY MASS INDEX: 32.12 KG/M2 | HEART RATE: 70 BPM | DIASTOLIC BLOOD PRESSURE: 52 MMHG | WEIGHT: 153 LBS | HEIGHT: 58 IN

## 2024-10-22 DIAGNOSIS — Z3A.18 18 WEEKS GESTATION OF PREGNANCY (HCC): Primary | ICD-10-CM

## 2024-10-22 PROCEDURE — 3074F SYST BP LT 130 MM HG: CPT

## 2024-10-22 PROCEDURE — 3008F BODY MASS INDEX DOCD: CPT

## 2024-10-22 PROCEDURE — 3078F DIAST BP <80 MM HG: CPT

## 2024-10-22 NOTE — PROGRESS NOTES
PETEY 18w1d    She is doing well, no complaints       SHE DOES NOT WANT ANY MALE PROVIDERS.     34 year old , EDC by 8w0d US   - NIPT & Carrier discussed- declined  - msAFP: declined     Obesity (pre preg BMI 33)   -consider L2US, growths, NSTs per MFM recommendations - consult ordered - has appt scheduled for 24  -A1c: 5.6 on , CMP normal   -limit wt gain 11-20 lbs      AMA at time of delivery  - see  testing above      H/o oligohydramnios   -IOL at 41 wks for low fluid   -she was scared of IOL, wanted to wait as long as possible.

## 2024-11-06 ENCOUNTER — ULTRASOUND ENCOUNTER (OUTPATIENT)
Dept: PERINATAL CARE | Facility: HOSPITAL | Age: 34
End: 2024-11-06
Attending: OBSTETRICS & GYNECOLOGY
Payer: COMMERCIAL

## 2024-11-06 ENCOUNTER — OFFICE VISIT (OUTPATIENT)
Dept: PERINATAL CARE | Facility: HOSPITAL | Age: 34
End: 2024-11-06
Attending: STUDENT IN AN ORGANIZED HEALTH CARE EDUCATION/TRAINING PROGRAM
Payer: COMMERCIAL

## 2024-11-06 VITALS
WEIGHT: 154 LBS | SYSTOLIC BLOOD PRESSURE: 101 MMHG | HEART RATE: 80 BPM | BODY MASS INDEX: 31.04 KG/M2 | DIASTOLIC BLOOD PRESSURE: 65 MMHG | HEIGHT: 59 IN

## 2024-11-06 DIAGNOSIS — O09.529 AMA (ADVANCED MATERNAL AGE) MULTIGRAVIDA 35+ (HCC): ICD-10-CM

## 2024-11-06 DIAGNOSIS — O09.522 MULTIGRAVIDA OF ADVANCED MATERNAL AGE IN SECOND TRIMESTER (HCC): ICD-10-CM

## 2024-11-06 DIAGNOSIS — E66.09 OTHER OBESITY DUE TO EXCESS CALORIES AFFECTING PREGNANCY IN SECOND TRIMESTER (HCC): ICD-10-CM

## 2024-11-06 DIAGNOSIS — O09.522 MULTIGRAVIDA OF ADVANCED MATERNAL AGE IN SECOND TRIMESTER (HCC): Primary | ICD-10-CM

## 2024-11-06 DIAGNOSIS — O99.212 OTHER OBESITY DUE TO EXCESS CALORIES AFFECTING PREGNANCY IN SECOND TRIMESTER (HCC): ICD-10-CM

## 2024-11-06 PROCEDURE — 76811 OB US DETAILED SNGL FETUS: CPT | Performed by: OBSTETRICS & GYNECOLOGY

## 2024-11-06 NOTE — PROGRESS NOTES
Pt here for level II ultrasound/doctor consult  + FM  Pt c/o increased pelvic pressure and lower back discomfort at night, pt denies vag bleeding, leaking fluid and uterine cramping.

## 2024-11-06 NOTE — PROGRESS NOTES
Outpatient Maternal-Fetal Medicine Consultation    Dear Dr. Rivera    Thank you for requesting ultrasound evaluation and maternal fetal medicine consultation on your patient Regina Garcias.  As you are aware she is a 34 year old female  with a wiggins pregnancy and an Estimated Date of Delivery: 3/24/25.  A maternal-fetal medicine consultation was requested secondary to Advanced Maternal Age and Obesity.  Her prenatal records and labs were reviewed.    Excited to be pregnant.  Asks if she needs to take more precautions this pregnancy.  No complications in last pregnancy 6 years ago  ROS    HISTORY  OB History    Para Term  AB Living   2 1 1     1   SAB IAB Ectopic Multiple Live Births         0 1      # Outcome Date GA Lbr [place]/2nd Weight Sex Type Anes PTL Lv   2 Current            1 Term 18 41w0d 07:22 / 02:25 7 lb 13.9 oz (3.57 kg) M NORMAL SPONT EPI N ISABELL      Complications: Meconium, Variable decelerations, Fetal tachycardia, Oligohydramnios       Allergies:  Allergies[1]   Current Meds:  Current Outpatient Medications   Medication Sig Dispense Refill    Omega-3 Fatty Acids (FISH OIL OMEGA-3 OR) Take by mouth.      Prenatal MV-Min-Fe Fum-FA-DHA (PRENATAL/FOLIC ACID+DHA) 27-0.8-200 MG Oral Cap Take 1 tablet by mouth daily. 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   Tobacco Use    Smoking status: Never    Smokeless tobacco: Never   Vaping Use    Vaping status: Never Used   Substance and Sexual Activity    Alcohol use: No      Comment: 1-2 beers a week    Drug use: No    Sexual activity: Yes     Partners: Male   Other Topics Concern    Caffeine Concern No    Stress Concern Yes    Weight Concern Yes    Special Diet No    Exercise Yes     Comment: Unmotivated to exercise    Seat Belt Yes   Social History Narrative    Balanced diet          PHYSICAL EXAMINATION:  /65 (BP Location: Right arm, Patient Position: Sitting, Cuff Size: adult)   Pulse 80   Ht 4' 11\" (1.499 m)   Wt 154 lb (69.9 kg)   LMP 05/07/2024 (Exact Date)   BMI 31.10 kg/m²   Physical Exam  Constitutional:       Appearance: Normal appearance.   Abdominal:      Palpations: Abdomen is soft.      Tenderness: There is no abdominal tenderness.   Neurological:      Mental Status: She is alert.   Psychiatric:         Mood and Affect: Mood normal.         Behavior: Behavior normal.           OBSTETRIC ULTRASOUND  The patient had a level II ultrasound today which revealed size consistent with dates and a normal detailed anatomic survey.  Ultrasound Findings:  Single IUP in cephalic presentation.    Placenta is posterior.   A 3 vessel cord is noted.  Cardiac activity is present at 141 bpm   g ( 0 lb 12 oz);   MVP is 3.6 cm .     The fetal measurements are consistent with the established EDC. No ultrasound evidence of structural abnormalities are seen today. The nasal bone is present. No ultrasound evidence of markers for aneuploidy are seen. She understands that ultrasound exam cannot exclude genetic abnormalities and that genetic testing is recommended. The limitations of ultrasound were discussed.     Uterus and adnexa appeared normal  today on US  See PACS/Imaging Tab For Complete Ultrasound Report  I interpreted the results and reviewed them with the patient.    DISCUSSION  During her visit we discussed and reviewed the following issues:  ADVANCED MATERNAL AGE    Background  I reviewed with the patient that pregnancies in women of advanced maternal age (35 or older  at delivery) are associated with elevated risks. Specifically, there is a higher rate of:  Fetal malformations  Preeclampsia  Gestational diabetes  Intrauterine fetal death    As a result, enhanced pregnancy surveillance is advised for these patients including a comprehensive ultrasound to assess for fetal malformations (at 20 weeks) and a third trimester ultrasound assessment for fetal growth (at 32 weeks). In addition, weekly NST's (initiating at 36 weeks gestation for women 35-39 years and at 32 weeks gestation for women 40 years and older) are also advised. Routine obstetric care is more than adequate to assess for gestational diabetes and preeclampsia; hence, no further significant alterations in obstetric care are advised.    Medical Complications    Women 35 years of age or older can expect to experience two to three fold higher rates of hospitalization,  delivery, and pregnancy-related complications when compared to their younger counterparts.  The two most common medical problems complicating these  pregnanccies are hypertension and diabetes.   The incidence of preeclampsia in the general obstetric population is 3 to 4 percent; this increases to 5 to 10 percent in women over age 40 and is as high as 35 percent in women over age 50.   The incidence of gestational diabetes in the general obstetric population is 3 percent, rising to 7 to 12 percent in women over age 40 and 20 percent in women over age 50.  Women 35 years of age or older are more likely to be delivered by . The  delivery rate in the general obstetric population of the [place] is almost 30 percent, compared to almost 50 percent in women over age 40 to 45 and almost 80 percent in women age 50 to 63.          Fetal Death        A decision analysis tool using data from the [employer] predicted a strategy of weekly antepartum testing and labor induction would lower the risk of unexplained fetal  death in women 35 years of age or older. In this model, weekly testing starting at 36 weeks of gestation would drop the risk of fetal death from 5.2 to 1.3 per 1000 pregnancies. While a policy of antepartum testing in older women does increase the chance that a women will be induced (71 inductions per fetal death averted) and thereby increases her risk of having a  delivery, only 14 additional cesareans would need to be performed to avert one unexplained fetal death.  Hence, weekly NST's are advised for women of advanced maternal age; testing should be initiated at 36 weeks for women 35-39 years and at 32 weeks for women 40 years and older.    Fetal Malformations    Cardiac malformations, clubfoot, and diaphragmatic hernia appear to occur with increased frequency in offspring of older women. These abnormalities are structural and unrelated to aneuploidy, thus they would not be detected by karyotype analysis.  For these reasons a complete, detailed ultrasound (level II) is advised even if the fetus has a normal karyotype.      Fetal Aneuploidy      Invasive Testing  I offered invasive genetic testing (amniocentesis, chorionic villus sampling) after reviewing the diagnostic accuracy of these tests as well as the procedure associated loss rate (1:500 for genetic amniocentesis).    She ultimately does not desire invasive genetic testing.     We discussed  the increased risk of chromosomal abnormalities associated with advanced maternal age at age  35 year old. She understands that ultrasound exam cannot exclude potential genetic abnormalities.  Her estimated risk based on maternal age at term with any chromosome abnormality is about 1: 200 and with Down Syndrome is about 1: 350.   We also discussed the risks and benefits of having  genetic testing ([facility] and amniocentesis) performed.      Non-invasive Pregnancy Testing (NIPT)  I reviewed current non-invasive screening options. Currently non-invasive pregnancy  testing (NIPT) offers the highest detection rate (with the lowest false positive rate) for the detection of fetal aneuploidy amongst high-risk patients. The limitations of detailed mid-trimester sonography was reviewed with the patient. First trimester screening and second trimester multiple-marker serum serum screening as alternative aneuploidy screening options were also reviewed. However, both of these tests are associated with lower detection and higher false positive rates.    OBESITY:  Her BMI prior to pregnancy was 32  Obesity during pregnancy is associated with numerous maternal and  risks.  It is not clear whether obesity is a direct cause of adverse pregnancy outcome or whether the association between obesity and adverse pregnancy outcome is due to factors such as diabetes mellitus.   Data suggest that obese women should be encouraged to undertake a weight reduction program (diet, exercise, behavior modification, and possibly bariatric surgery in some cases) prior to attempting to conceive.            Subfertility in obese women is most commonly related to ovulatory dysfunction, and, in some obese women, the ovulatory dysfunction is related to polycystic ovary syndrome (PCOS). It is also important to note that even among ovulatory women, increasing obesity is associated with decreasing spontaneous pregnancy rates.  The increased risk of miscarriage in obese women may be because such women often have PCOS or isolated insulin resistance.                 Due to its strong association with obesity in the general population, type 2 diabetes mellitus is one of the two most common medical complications of the obese . The increased risk of type 2 diabetes is primarily related to an exaggerated increase in insulin resistance in the obese state. It is reasonable to screen obese gravidas for undiagnosed pregestational diabetes in the first trimester.   Glucose intolerance associated with gestational  diabetes generally resolves postpartum; however, obese women with a history of gestational diabetes have a two-fold increased prevalence of subsequent type 2 diabetes.           An association between obesity and hypertensive disorders during pregnancy has been consistently reported.  In particular, maternal weight and BMI are independent risk factors for preeclampsia.             Studies have found that the increased risk of  birth in obese gravidas is primarily associated with obesity-related medical and  complications, rather than an intrinsic predisposition to spontaneous  birth. Prevention of  birth in these patients, therefore, should be directed toward prevention or management of medical and obstetrical complications.               Both prepregnancy obesity and excessive maternal weight gain before or during pregnancy contribute to an increased probability of  delivery.  It has also been hypothesized that obesity may lead to dystocia due to increased soft tissue deposition in the maternal pelvis.    delivery in the obese  is associated with numerous perioperative concerns, including emergency delivery, prolonged incision to delivery interval, blood loss >1000 mL, longer operative times, wound infection, thromboembolism, and endometritis.            Maternal obesity appears to be associated with a small increase in the absolute rate of some congenital anomalies (primarily neural tube defect and cardiac), and the risk may increase with increasing maternal weight.  Level II ultrasound is advised for women with obesity.  The risk of neural tube defects increased significantly with maternal weight.    The analysis found that overweight and obese pregnant women experienced significantly more stillbirths than normal weight women.  Third trimester  testing is advised.     Prevention of Preeclampsia    Antiplatelet Agents  The observation that preeclampsia  is associated with increased platelet turnover and increased platelet-derived thromboxane levels led to randomized trials evaluating low-dose aspirin therapy in women thought to be at increased risk of the disease. As opposed to higher dose aspirin therapy, low-dose aspirin (60 to 150 mg per day) diminishes platelet thromboxane synthesis while maintaining vascular wall prostacyclin synthesis. Although not well studied, the beneficial effect of low-dose aspirin for prevention of preeclampsia may also be in part related to modulation of inflammation. The drug has been studied for both prevention of preeclampsia and prevention of progression from mild to severe disease. It appears to result in a modest reduction in risk of preeclampsia when given to women at moderate to high risk of preeclampsia.  This approach has been studied in over 35,000 women, for both prevention of preeclampsia and prevention of progression of the disease. Low dose aspirin has a modest impact on pregnancy outcome; it reduces the risk of preeclampsia, as well as other adverse pregnancy outcomes (eg,  delivery, fetal growth restriction) by about 10 to 20 percent. Low dose aspirin is safe in pregnancy; thus, it is a reasonable strategy in women with a moderate to high risk of preeclampsia in whom the benefits outweigh the risks.     Clinical Guidelines  Based on the available data, low-dose aspirin is advised for women at high risk for preeclampsia. There is no consensus on the criteria that confer high risk. The [place] Preventive Services Task Force (USPSTF) risk criteria are reasonable.  USPSTF criteria for high risk include one or more of the following:   Previous pregnancy with preeclampsia, especially early onset and with an adverse outcome   Multifetal gestation  Chronic hypertension   Type 1 or 2 diabetes mellitus  Chronic kidney disease  Autoimmune disease (antiphospholipid syndrome, systemic lupus erythematosus)     Women  with multiple moderate risk factors for preeclampsia are considered high risk, as well. Identification of women with an appropriate combination of moderate risk factors to be considered high risk is subjective and determined case by case, as the data describing the magnitude of the association between each of these risk factors and development of preeclampsia vary widely and lack consistency. USPSTF criteria for moderate risk include:  Nulliparity  Obesity (body mass index >30 kg/m2)  Family history of preeclampsia in mother or sister  Age >=35 years  Sociodemographic characteristics (, low socioeconomic level)  Personal risk factors (eg, history of low birth weight or small for gestational age, previous adverse pregnancy outcome, >10 year pregnancy interval)     If used, daily low-dose aspirin should be initiated in the 12th or 13th week of gestation, although adverse effects from earlier initiation (eg, preconception) have not been documented. The optimum low dose is unclear; 81 mg is readily available, but 100 or 150 mg (which are not available in some countries including the [place] may be more effective.  In some pregnant women, platelet function is not inhibited by the 81 mg dose but is altered by higher dosing. Hence, current evidence has suggested a daily dose of 100 to 150 mg of aspirin rather than a lower dose. In the [place], this can be achieved by taking one and one-half 81 mg tablets; however, taking one 81 mg tablet is also reasonable since this is the commercially available dose and has proven efficacy. For prevention of preeclampsia, no trials have evaluated the efficacy of a higher dose of aspirin, which could be achieved easily by taking two 81 mg tablets or splitting a 325 mg tablet. For prevention of myocardial infarction and stroke in nonpregnant individuals, aspirin appears to be equally effective at doses between 75 and 325 mg per day.  The safety of low-dose aspirin  use in the second and third trimesters is well established, but questions linger regarding use in the first trimester (possible increase in minor vaginal bleeding or gastroschisis). Early therapy (before 16 weeks) may be important since the pathophysiologic features of preeclampsia develop at this time, weeks before clinical disease is apparent. However, available evidence is inconsistent about the importance of early initiation of therapy, possibly because aspirin has major effects on prostacyclin production and endothelial function throughout gestation.  Aspirin is discontinued 5 to 10 days before expected delivery to diminish the risk of bleeding during delivery; however, no adverse maternal or fetal effects related to low-dose aspirin have been proven.  Major questions remain as to which, if any, subgroups of women are more likely to benefit from low-dose aspirin therapy, when treatment should be started (first or second trimester), and the optimum dose to inhibit placental PGH synthase (cyclooxygenase) and also allow the anti-inflammatory effects of low-dose aspirin.       Declines aspirin therapy for preeclampsia prevention.          IMPRESSION:  IUP at 20w2d   AMA Declines aneuploidy screening and invasive testing  Obesity    RECOMMENDATIONS:  Continue care with Dr. Rivera  Follow-up Growth ultrasound with BPP at 32 weeks.  Weekly NST's at 36 weeks.        Thank you for allowing me to participate in the care of your patient.  Please do not hesitate to contact me if additional questions or concerns arise.      Yasmin Foster M.D.    40 minutes spent in review of records, patient consultation, documentation and coordination of care.  The relevant clinical matter(s) are summarized above.     Note to patient and family  The 21st Century Cures Act makes medical notes available to patients in the interest of transparency.  However, please be advised that this is a medical document.  It is intended as [id number]  communication.  It is written and medical language may contain abbreviations or verbiage that are technical and unfamiliar.  It may appear blunt or direct.  Medical documents are intended to carry relevant information, facts as evident, and the clinical opinion of the practitioner.         [1] No Known Allergies

## 2024-11-15 ENCOUNTER — TELEPHONE (OUTPATIENT)
Facility: CLINIC | Age: 34
End: 2024-11-15

## 2024-11-15 NOTE — TELEPHONE ENCOUNTER
Breast Pump order was received from [address].  Order form was placed in Dr. Tomasa Fenton's bin for signature.

## 2024-11-19 ENCOUNTER — ROUTINE PRENATAL (OUTPATIENT)
Facility: CLINIC | Age: 34
End: 2024-11-19
Payer: COMMERCIAL

## 2024-11-19 VITALS
DIASTOLIC BLOOD PRESSURE: 60 MMHG | SYSTOLIC BLOOD PRESSURE: 118 MMHG | HEIGHT: 59 IN | BODY MASS INDEX: 31.45 KG/M2 | HEART RATE: 88 BPM | WEIGHT: 156 LBS

## 2024-11-19 DIAGNOSIS — Z34.93 PRENATAL CARE IN THIRD TRIMESTER (HCC): Primary | ICD-10-CM

## 2024-11-19 DIAGNOSIS — N39.3 SUI (STRESS URINARY INCONTINENCE, FEMALE): ICD-10-CM

## 2024-11-19 DIAGNOSIS — O09.522 MULTIGRAVIDA OF ADVANCED MATERNAL AGE IN SECOND TRIMESTER (HCC): ICD-10-CM

## 2024-11-19 DIAGNOSIS — O99.210 OBESITY AFFECTING PREGNANCY, ANTEPARTUM, UNSPECIFIED OBESITY TYPE (HCC): ICD-10-CM

## 2024-11-19 DIAGNOSIS — N81.89 WEAKNESS OF PELVIC FLOOR: ICD-10-CM

## 2024-11-19 DIAGNOSIS — Z11.3 SCREEN FOR STD (SEXUALLY TRANSMITTED DISEASE): ICD-10-CM

## 2024-11-19 PROCEDURE — 3074F SYST BP LT 130 MM HG: CPT | Performed by: STUDENT IN AN ORGANIZED HEALTH CARE EDUCATION/TRAINING PROGRAM

## 2024-11-19 PROCEDURE — 3008F BODY MASS INDEX DOCD: CPT | Performed by: STUDENT IN AN ORGANIZED HEALTH CARE EDUCATION/TRAINING PROGRAM

## 2024-11-19 PROCEDURE — 3078F DIAST BP <80 MM HG: CPT | Performed by: STUDENT IN AN ORGANIZED HEALTH CARE EDUCATION/TRAINING PROGRAM

## 2024-11-19 NOTE — PROGRESS NOTES
PETEY 22.1    She is doing well, no complaints   +FM  Some pelvic floor pain and low back pain     SHE DOES NOT WANT ANY MALE PROVIDERS.     34 year old , EDC by 8w0d US   - NIPT & Carrier discussed- declined  - msAFP: declined  - L2US nl   - CBC, 1 hour GTT, syphillis and HIV testing all ordered     Obesity (pre preg BMI 33)   -nl L2US  - follow up growth w BPP at 32 weeks - 2025  -A1c: 5.6 on , CMP normal   -limit wt gain 11-20 lbs      AMA at time of delivery  - see  testing above      H/o oligohydramnios   -IOL at 41 wks for low fluid   -she was scared of IOL, wanted to wait as long as possible.     PFPT and low back pain  [ ] pelvic PT order placed

## 2024-12-17 ENCOUNTER — ROUTINE PRENATAL (OUTPATIENT)
Facility: CLINIC | Age: 34
End: 2024-12-17
Payer: COMMERCIAL

## 2024-12-17 VITALS
SYSTOLIC BLOOD PRESSURE: 98 MMHG | DIASTOLIC BLOOD PRESSURE: 58 MMHG | HEART RATE: 81 BPM | WEIGHT: 157 LBS | BODY MASS INDEX: 32.95 KG/M2 | HEIGHT: 58 IN

## 2024-12-17 DIAGNOSIS — Z3A.26 26 WEEKS GESTATION OF PREGNANCY (HCC): Primary | ICD-10-CM

## 2024-12-17 DIAGNOSIS — O09.522 AMA (ADVANCED MATERNAL AGE) MULTIGRAVIDA 35+, SECOND TRIMESTER (HCC): ICD-10-CM

## 2024-12-17 PROCEDURE — 3008F BODY MASS INDEX DOCD: CPT

## 2024-12-17 PROCEDURE — 3074F SYST BP LT 130 MM HG: CPT

## 2024-12-17 PROCEDURE — 3078F DIAST BP <80 MM HG: CPT

## 2024-12-17 NOTE — PROGRESS NOTES
Derek 26w1d    EDC by 8w0d US   - NIPT & Carrier discussed- declined  - msAFP: declined  - L2US nl   - CBC, 1 hour GTT, syphillis and HIV testing all ordered at prior appt   -advise her to have 1 hr GTT & CBC done between 24-28 wks and for 3rd tri HIV & T pal done at 28 wks.  -tdap discussed - ask at next visit      Obesity (pre preg BMI 33)   -nl L2US  - follow up growth w BPP at 32 weeks - 2025  -A1c: 5.6 on , CMP normal   -limit wt gain 11-20 lbs      AMA at time of delivery  - see  testing above      H/o oligohydramnios   -IOL at 41 wks for low fluid   -she was scared of IOL, wanted to wait as long as possible.      PFPT and low back pain  [ ] pelvic PT order placed

## 2024-12-31 ENCOUNTER — ROUTINE PRENATAL (OUTPATIENT)
Dept: OBGYN CLINIC | Facility: CLINIC | Age: 34
End: 2024-12-31
Payer: COMMERCIAL

## 2024-12-31 VITALS
WEIGHT: 160 LBS | DIASTOLIC BLOOD PRESSURE: 65 MMHG | SYSTOLIC BLOOD PRESSURE: 101 MMHG | HEART RATE: 92 BPM | BODY MASS INDEX: 33.58 KG/M2 | HEIGHT: 58 IN

## 2024-12-31 DIAGNOSIS — O09.522 AMA (ADVANCED MATERNAL AGE) MULTIGRAVIDA 35+, SECOND TRIMESTER (HCC): ICD-10-CM

## 2024-12-31 DIAGNOSIS — Z34.90 PRENATAL CARE, ANTEPARTUM (HCC): Primary | ICD-10-CM

## 2024-12-31 DIAGNOSIS — Z3A.28 28 WEEKS GESTATION OF PREGNANCY (HCC): ICD-10-CM

## 2024-12-31 PROCEDURE — 3008F BODY MASS INDEX DOCD: CPT | Performed by: STUDENT IN AN ORGANIZED HEALTH CARE EDUCATION/TRAINING PROGRAM

## 2024-12-31 PROCEDURE — 3074F SYST BP LT 130 MM HG: CPT | Performed by: STUDENT IN AN ORGANIZED HEALTH CARE EDUCATION/TRAINING PROGRAM

## 2024-12-31 PROCEDURE — 90715 TDAP VACCINE 7 YRS/> IM: CPT | Performed by: STUDENT IN AN ORGANIZED HEALTH CARE EDUCATION/TRAINING PROGRAM

## 2024-12-31 PROCEDURE — 3078F DIAST BP <80 MM HG: CPT | Performed by: STUDENT IN AN ORGANIZED HEALTH CARE EDUCATION/TRAINING PROGRAM

## 2024-12-31 PROCEDURE — 90471 IMMUNIZATION ADMIN: CPT | Performed by: STUDENT IN AN ORGANIZED HEALTH CARE EDUCATION/TRAINING PROGRAM

## 2025-01-15 ENCOUNTER — LAB ENCOUNTER (OUTPATIENT)
Dept: LAB | Age: 35
End: 2025-01-15
Attending: STUDENT IN AN ORGANIZED HEALTH CARE EDUCATION/TRAINING PROGRAM
Payer: COMMERCIAL

## 2025-01-15 ENCOUNTER — TELEPHONE (OUTPATIENT)
Facility: CLINIC | Age: 35
End: 2025-01-15

## 2025-01-15 ENCOUNTER — ROUTINE PRENATAL (OUTPATIENT)
Facility: CLINIC | Age: 35
End: 2025-01-15
Payer: COMMERCIAL

## 2025-01-15 VITALS
BODY MASS INDEX: 32.7 KG/M2 | HEART RATE: 97 BPM | DIASTOLIC BLOOD PRESSURE: 62 MMHG | HEIGHT: 59 IN | WEIGHT: 162.19 LBS | SYSTOLIC BLOOD PRESSURE: 114 MMHG

## 2025-01-15 DIAGNOSIS — O09.522 AMA (ADVANCED MATERNAL AGE) MULTIGRAVIDA 35+, SECOND TRIMESTER (HCC): ICD-10-CM

## 2025-01-15 DIAGNOSIS — O09.523 AMA (ADVANCED MATERNAL AGE) MULTIGRAVIDA 35+, THIRD TRIMESTER (HCC): Primary | ICD-10-CM

## 2025-01-15 DIAGNOSIS — Z11.3 SCREEN FOR STD (SEXUALLY TRANSMITTED DISEASE): ICD-10-CM

## 2025-01-15 DIAGNOSIS — R73.09 ELEVATED GLUCOSE TOLERANCE TEST: Primary | ICD-10-CM

## 2025-01-15 DIAGNOSIS — Z3A.30 30 WEEKS GESTATION OF PREGNANCY (HCC): ICD-10-CM

## 2025-01-15 DIAGNOSIS — Z34.93 PRENATAL CARE IN THIRD TRIMESTER (HCC): ICD-10-CM

## 2025-01-15 LAB
BASOPHILS # BLD AUTO: 0.09 X10(3) UL (ref 0–0.2)
BASOPHILS NFR BLD AUTO: 0.7 %
EOSINOPHIL # BLD AUTO: 0.27 X10(3) UL (ref 0–0.7)
EOSINOPHIL NFR BLD AUTO: 2.1 %
ERYTHROCYTE [DISTWIDTH] IN BLOOD BY AUTOMATED COUNT: 13.9 %
GLUCOSE 1H P GLC SERPL-MCNC: 160 MG/DL
HCT VFR BLD AUTO: 35 %
HGB BLD-MCNC: 11 G/DL
IMM GRANULOCYTES # BLD AUTO: 0.11 X10(3) UL (ref 0–1)
IMM GRANULOCYTES NFR BLD: 0.8 %
LYMPHOCYTES # BLD AUTO: 2.41 X10(3) UL (ref 1–4)
LYMPHOCYTES NFR BLD AUTO: 18.5 %
MCH RBC QN AUTO: 29.6 PG (ref 26–34)
MCHC RBC AUTO-ENTMCNC: 31.4 G/DL (ref 31–37)
MCV RBC AUTO: 94.3 FL
MONOCYTES # BLD AUTO: 1.1 X10(3) UL (ref 0.1–1)
MONOCYTES NFR BLD AUTO: 8.4 %
NEUTROPHILS # BLD AUTO: 9.05 X10 (3) UL (ref 1.5–7.7)
NEUTROPHILS # BLD AUTO: 9.05 X10(3) UL (ref 1.5–7.7)
NEUTROPHILS NFR BLD AUTO: 69.5 %
PLATELET # BLD AUTO: 227 10(3)UL (ref 150–450)
RBC # BLD AUTO: 3.71 X10(6)UL
T PALLIDUM AB SER QL IA: NONREACTIVE
WBC # BLD AUTO: 13 X10(3) UL (ref 4–11)

## 2025-01-15 PROCEDURE — 3008F BODY MASS INDEX DOCD: CPT

## 2025-01-15 PROCEDURE — 85025 COMPLETE CBC W/AUTO DIFF WBC: CPT | Performed by: STUDENT IN AN ORGANIZED HEALTH CARE EDUCATION/TRAINING PROGRAM

## 2025-01-15 PROCEDURE — 86780 TREPONEMA PALLIDUM: CPT | Performed by: STUDENT IN AN ORGANIZED HEALTH CARE EDUCATION/TRAINING PROGRAM

## 2025-01-15 PROCEDURE — 3074F SYST BP LT 130 MM HG: CPT

## 2025-01-15 PROCEDURE — 87389 HIV-1 AG W/HIV-1&-2 AB AG IA: CPT | Performed by: STUDENT IN AN ORGANIZED HEALTH CARE EDUCATION/TRAINING PROGRAM

## 2025-01-15 PROCEDURE — 3078F DIAST BP <80 MM HG: CPT

## 2025-01-15 PROCEDURE — 82950 GLUCOSE TEST: CPT | Performed by: STUDENT IN AN ORGANIZED HEALTH CARE EDUCATION/TRAINING PROGRAM

## 2025-01-15 NOTE — TELEPHONE ENCOUNTER
Follow up with General Surgery for her hemorrhoids. Patient verbalized understanding, agreed to and intend to comply with plan of care.

## 2025-01-15 NOTE — PROGRESS NOTES
Derek 30w2d     After she has BM, it is painful and sees a fleshy like tissue come out, she has to push it back in. Has history of external hemorrhoids with first pregnancy.-General surgery consult advised       EDC by 8w0d US   - NIPT & Carrier discussed- declined  - msAFP: declined  - L2US nl   - CBC, 1 hour GTT, syphillis and HIV testing all ordered at prior appt   -advise her to have 1 hr GTT & CBC done between 24-28 wks and for 3rd tri HIV & T pal done at 28 - -wks. - completed it today, results pending   -tdap done 2024     Obesity (pre preg BMI 33)   -nl L2US  - follow up growth w BPP at 32 weeks - 2025  -A1c: 5.6 on , CMP normal   -limit wt gain 11-20 lbs      AMA at time of delivery  - see  testing above      H/o oligohydramnios   -IOL at 41 wks for low fluid   -she was scared of IOL, wanted to wait as long as possible.      PFPT and low back pain  [ ] pelvic PT order placed - info given

## 2025-01-29 ENCOUNTER — OFFICE VISIT (OUTPATIENT)
Dept: PERINATAL CARE | Facility: HOSPITAL | Age: 35
End: 2025-01-29
Attending: OBSTETRICS & GYNECOLOGY
Payer: COMMERCIAL

## 2025-01-29 VITALS
HEART RATE: 90 BPM | SYSTOLIC BLOOD PRESSURE: 98 MMHG | BODY MASS INDEX: 32.86 KG/M2 | HEIGHT: 59 IN | WEIGHT: 163 LBS | DIASTOLIC BLOOD PRESSURE: 67 MMHG

## 2025-01-29 DIAGNOSIS — E66.09 OTHER OBESITY DUE TO EXCESS CALORIES AFFECTING PREGNANCY IN THIRD TRIMESTER (HCC): ICD-10-CM

## 2025-01-29 DIAGNOSIS — O09.523 MULTIGRAVIDA OF ADVANCED MATERNAL AGE IN THIRD TRIMESTER (HCC): ICD-10-CM

## 2025-01-29 DIAGNOSIS — O99.213 OTHER OBESITY DUE TO EXCESS CALORIES AFFECTING PREGNANCY IN THIRD TRIMESTER (HCC): ICD-10-CM

## 2025-01-29 DIAGNOSIS — O99.810 PREGNANCY-INDUCED GLUCOSE INTOLERANCE (HCC): ICD-10-CM

## 2025-01-29 DIAGNOSIS — O99.212 OTHER OBESITY DUE TO EXCESS CALORIES AFFECTING PREGNANCY IN SECOND TRIMESTER (HCC): ICD-10-CM

## 2025-01-29 DIAGNOSIS — O09.523 MULTIGRAVIDA OF ADVANCED MATERNAL AGE IN THIRD TRIMESTER (HCC): Primary | ICD-10-CM

## 2025-01-29 DIAGNOSIS — E66.09 OTHER OBESITY DUE TO EXCESS CALORIES AFFECTING PREGNANCY IN SECOND TRIMESTER (HCC): ICD-10-CM

## 2025-01-29 PROCEDURE — 76819 FETAL BIOPHYS PROFIL W/O NST: CPT

## 2025-01-29 PROCEDURE — 76816 OB US FOLLOW-UP PER FETUS: CPT | Performed by: OBSTETRICS & GYNECOLOGY

## 2025-01-29 NOTE — PROGRESS NOTES
Pt here for growth ultrasound  Pt reports + FM  Pt denies any concerns. Pt states she still needs to do her 3 hour GTT.

## 2025-01-29 NOTE — PROGRESS NOTES
Outpatient Maternal-Fetal Medicine Consultation    Dear Dr. Rivera    Thank you for requesting ultrasound evaluation and maternal fetal medicine consultation on your patient Regina Garcias.  As you are aware she is a 34 year old female  with a wiggins pregnancy and an Estimated Date of Delivery: 3/24/25.  A maternal-fetal medicine  f/u is todsy  Her prenatal records and labs were reviewed.    May be able to do 3 hr glucose test this weekend if she has time.  ROS    HISTORY  OB History    Para Term  AB Living   2 1 1     1   SAB IAB Ectopic Multiple Live Births         0 1      # Outcome Date GA Lbr [place]/2nd Weight Sex Type Anes PTL Lv   2 Current            1 Term 18 41w0d 07:22 / 02:25 7 lb 13.9 oz (3.57 kg) M NORMAL SPONT EPI N ISABELL      Complications: Meconium, Variable decelerations, Fetal tachycardia, Oligohydramnios       Allergies:  Allergies[1]   Current Meds:  Current Outpatient Medications   Medication Sig Dispense Refill    Omega-3 Fatty Acids (FISH OIL OMEGA-3 OR) Take by mouth.      Prenatal MV-Min-Fe Fum-FA-DHA (PRENATAL/FOLIC ACID+DHA) 27-0.8-200 MG Oral Cap Take 1 tablet by mouth daily. 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   Tobacco Use    Smoking status: Never    Smokeless tobacco: Never   Vaping Use    Vaping status: Never Used   Substance and Sexual Activity    Alcohol use: No     Comment: 1-2 beers a week    Drug use: No    Sexual activity: Yes     Partners: Male   Other Topics Concern    Caffeine  Concern No    Stress Concern Yes    Weight Concern Yes    Special Diet No    Exercise Yes     Comment: Unmotivated to exercise    Seat Belt Yes   Social History Narrative    Balanced diet          PHYSICAL EXAMINATION:  BP 98/67 (BP Location: Right arm, Patient Position: Sitting, Cuff Size: adult)   Pulse 90   Ht 4' 11\" (1.499 m)   Wt 163 lb (73.9 kg)   LMP 05/07/2024 (Exact Date)   BMI 32.92 kg/m²   Physical Exam  Constitutional:       Appearance: Normal appearance.   Abdominal:      Palpations: Abdomen is soft.      Tenderness: There is no abdominal tenderness.   Neurological:      Mental Status: She is alert.   Psychiatric:         Mood and Affect: Mood normal.         Behavior: Behavior normal.           OBSTETRIC ULTRASOUND  The patient had a follow-up growth and BPP ultrasound today which revealed normal interval fetal growth and a BPP of 8/8.     Ultrasound Findings:  Single IUP in cephalic presentation.    Placenta is posterior.   Cardiac activity is present at 148 bpm  EFW 1998 g ( 4 lb 6 oz); 51%.    BRYNN is  16.8 cm.  MVP is 4.8 cm  BPP is 8/8.     The fetal measurements are consistent with established EDC. No gross ultrasound evidence of structural abnormalities are seen today. The patient understands that ultrasound cannot rule out all structural and chromosomal abnormalities.     See PACS/Imaging Tab For Complete Ultrasound Report  I interpreted the results and reviewed them with the patient.    DISCUSSION  During her visit we discussed and reviewed the following issues:  ADVANCED MATERNAL AGE    Background  I reviewed with the patient that pregnancies in women of advanced maternal age (35 or older at delivery) are associated with elevated risks. Specifically, there is a higher rate of:  Fetal malformations  Preeclampsia  Gestational diabetes  Intrauterine fetal death   Please see previous [facility] detailed discussion.       OBESITY:  Her BMI prior to pregnancy was 32 Please see previous [facility] detailed  discussion.     Prevention of Preeclampsia   Please see previous [facility] detailed discussion.     Declines aspirin therapy for preeclampsia prevention.        GLUCOSE 1HR OB   Date Value Ref Range Status   01/15/2025 160 (H) See comment mg/dL Final     Comment:     If the plasma glucose level measured after 1 hour is >=130, 135 or 140 mg/dl proceed to \"Glucose Tolerance, 100 gm (0 hr, 1 hr, 2hr, 3hr), Gestational (ADA)\" test on a separate day, as clinically indicated.        No results found for: \"GLUFG\", \"GLU1G\", \"GLU2G\", \"GLU3G\"     I went over theimportance of determining if she does or does not have GDM.  Gestational diabetes is caused by the placenta that produces hormones that then make a mom's blood sugars high.  This can affect how well her baby does after delivery as well as cause a baby to grow faster and have a risk of shoulder dystocia at time of delivery.      IMPRESSION:  IUP at 32w2d   AMA Declines aneuploidy screening and invasive testing  Obesity  Glucose intolerance    RECOMMENDATIONS:  Continue care with Dr. Rivera  Weekly NST's at 36 weeks.  Complete 3 hr gtt ASAP      Thank you for allowing me to participate in the care of your patient.  Please do not hesitate to contact me if additional questions or concerns arise.      Yasmin Foster M.D.    20 minutes spent in review of records, patient consultation, documentation and coordination of care.  The relevant clinical matter(s) are summarized above.     Note to patient and family  The 21st Century Cures Act makes medical notes available to patients in the interest of transparency.  However, please be advised that this is a medical document.  It is intended as qcsv-ug-gldp communication.  It is written and medical language may contain abbreviations or verbiage that are technical and unfamiliar.  It may appear blunt or direct.  Medical documents are intended to carry relevant information, facts as evident, and the clinical opinion of the  practitioner.         [1] No Known Allergies

## 2025-01-30 ENCOUNTER — ROUTINE PRENATAL (OUTPATIENT)
Facility: CLINIC | Age: 35
End: 2025-01-30
Payer: COMMERCIAL

## 2025-01-30 VITALS
HEART RATE: 78 BPM | WEIGHT: 162 LBS | SYSTOLIC BLOOD PRESSURE: 100 MMHG | DIASTOLIC BLOOD PRESSURE: 64 MMHG | HEIGHT: 59 IN | BODY MASS INDEX: 32.66 KG/M2

## 2025-01-30 DIAGNOSIS — O99.210 OBESITY AFFECTING PREGNANCY, ANTEPARTUM, UNSPECIFIED OBESITY TYPE (HCC): ICD-10-CM

## 2025-01-30 DIAGNOSIS — O09.522 MULTIGRAVIDA OF ADVANCED MATERNAL AGE IN SECOND TRIMESTER (HCC): ICD-10-CM

## 2025-01-30 DIAGNOSIS — O09.523 AMA (ADVANCED MATERNAL AGE) MULTIGRAVIDA 35+, THIRD TRIMESTER (HCC): ICD-10-CM

## 2025-01-30 DIAGNOSIS — Z3A.32 32 WEEKS GESTATION OF PREGNANCY (HCC): ICD-10-CM

## 2025-01-30 DIAGNOSIS — Z34.90 PRENATAL CARE, ANTEPARTUM (HCC): Primary | ICD-10-CM

## 2025-01-30 PROCEDURE — 3008F BODY MASS INDEX DOCD: CPT | Performed by: STUDENT IN AN ORGANIZED HEALTH CARE EDUCATION/TRAINING PROGRAM

## 2025-01-30 PROCEDURE — 3074F SYST BP LT 130 MM HG: CPT | Performed by: STUDENT IN AN ORGANIZED HEALTH CARE EDUCATION/TRAINING PROGRAM

## 2025-01-30 PROCEDURE — 3078F DIAST BP <80 MM HG: CPT | Performed by: STUDENT IN AN ORGANIZED HEALTH CARE EDUCATION/TRAINING PROGRAM

## 2025-01-30 NOTE — PROGRESS NOTES
Derek 32.3     +FM, no LOF, no VB, no ctx      EDC by 8w0d US   - NIPT & Carrier discussed- declined  - msAFP: declined  - L2US nl   -failed 1 hour and is going to do 3 hour this weekend, other third trimester labs normal   -tdap done 2024     Obesity (pre preg BMI 33)   -nl L2US  - follow up growth w BPP at 32 weeks - 2025  -A1c: 5.6 on , CMP normal   -32w2d ultrasound: 4 lbs 6 oz (51st percentile)  -limit wt gain 11-20 lbs      AMA at time of delivery  - see  testing above      H/o oligohydramnios   -IOL at 41 wks for low fluid   -she was scared of IOL, wanted to wait as long as possible.      PFPT and low back pain  [ ] pelvic PT order placed -\"no time to do it\"

## 2025-01-31 ENCOUNTER — OFFICE VISIT (OUTPATIENT)
Dept: FAMILY MEDICINE CLINIC | Facility: CLINIC | Age: 35
End: 2025-01-31
Payer: COMMERCIAL

## 2025-01-31 VITALS
TEMPERATURE: 97 F | SYSTOLIC BLOOD PRESSURE: 105 MMHG | BODY MASS INDEX: 32.86 KG/M2 | DIASTOLIC BLOOD PRESSURE: 64 MMHG | HEIGHT: 59 IN | WEIGHT: 163 LBS | HEART RATE: 94 BPM | OXYGEN SATURATION: 99 % | RESPIRATION RATE: 16 BRPM

## 2025-01-31 DIAGNOSIS — J02.9 SORE THROAT: Primary | ICD-10-CM

## 2025-01-31 DIAGNOSIS — J06.9 VIRAL UPPER RESPIRATORY TRACT INFECTION: ICD-10-CM

## 2025-01-31 LAB
CONTROL LINE PRESENT WITH A CLEAR BACKGROUND (YES/NO): YES YES/NO
KIT LOT #: NORMAL NUMERIC
STREP GRP A CUL-SCR: NEGATIVE

## 2025-01-31 PROCEDURE — 99213 OFFICE O/P EST LOW 20 MIN: CPT | Performed by: FAMILY MEDICINE

## 2025-01-31 PROCEDURE — 3074F SYST BP LT 130 MM HG: CPT | Performed by: FAMILY MEDICINE

## 2025-01-31 PROCEDURE — 3008F BODY MASS INDEX DOCD: CPT | Performed by: FAMILY MEDICINE

## 2025-01-31 PROCEDURE — 3078F DIAST BP <80 MM HG: CPT | Performed by: FAMILY MEDICINE

## 2025-01-31 PROCEDURE — 87880 STREP A ASSAY W/OPTIC: CPT | Performed by: FAMILY MEDICINE

## 2025-01-31 NOTE — PATIENT INSTRUCTIONS
Your rapid strep was negative in the office today.     Use OTC meds for comfort as needed--  Ibuprofen/Tylenol for fever/pain  Use Benadryl at bedtime to reduce drainage and promote rest.  Zyrtec/Claritin/Allegra in the AM to reduce nasal drainage without sedation.   Use saline nasal sprays to reduce congestion and thin secretions.   Use Delsym for cough.   Consider applying corina's vapo-rub or eucayptus oil to chest and feet at bedtime to reduce chest and nasal congestion.   Warm tea with honey, cough lozenges, vaporizers/steam etc.    If no better in 2-3 days, follow-up with your PCP for further evaluation.

## 2025-01-31 NOTE — PROGRESS NOTES
CHIEF COMPLAINT:     Chief Complaint   Patient presents with    Sore Throat     Need to do a test for strep throat - Entered by patient  Burning throat, hasn't been able to sleep, body aches. Started 3 days ago.          HPI:   Regina Garcias is a 34 year old female presents to clinic with complaints of sore throat and post nasal drip. Not able to sleep for the last 2 nights due to drainage and sore throat.   Denies cough, fever, shortness of breath.   Pt is 32 weeks pregnant..  Symptoms for 3 days.  Reports no chills, no fever, no headache, no upset stomach, no ear pain, no rash, no diarrhea, no loss of smell/taste.    COVID exposure: none  Sick contacts: son with similar sx-- was sick for 2 weeks, but is now better.   COVID testing: none    Current Outpatient Medications   Medication Sig Dispense Refill    Omega-3 Fatty Acids (FISH OIL OMEGA-3 OR) Take by mouth.      Prenatal MV-Min-Fe Fum-FA-DHA (PRENATAL/FOLIC ACID+DHA) 27-0.8-200 MG Oral Cap Take 1 tablet by mouth daily. 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   Tobacco Use    Smoking status: Never    Smokeless tobacco: Never   Vaping Use    Vaping status: Never Used   Substance and Sexual Activity    Alcohol use: No     Comment: 1-2 beers a week    Drug use: No    Sexual activity: Yes     Partners: Male   Other Topics Concern    Caffeine Concern No    Stress Concern Yes    Weight Concern Yes    Special Diet No    Exercise Yes     Comment: Unmotivated to exercise    Seat Belt Yes   Social History Narrative    Balanced diet        REVIEW OF SYSTEMS:   GENERAL HEALTH: feels well otherwise, decreased appetite  SKIN: denies any unusual skin lesions or rashes  HEENT: See HPI  RESPIRATORY: denies shortness of breath or  wheezing  CARDIOVASCULAR: denies chest pain or palpitations   GI: denies vomiting or diarrhea  NEURO: denies dizziness or lightheadedness    EXAM:   /64 (BP Location: Right arm, Patient Position: Sitting)   Pulse 94   Temp 97.3 °F (36.3 °C) (Temporal)   Resp 16   Ht 4' 11\" (1.499 m)   Wt 163 lb (73.9 kg)   LMP 05/07/2024 (Exact Date)   SpO2 99%   BMI 32.92 kg/m²   GENERAL: well developed, well nourished, in no apparent distress  SKIN: no rashes,no suspicious lesions  HEAD: atraumatic, normocephalic  EYES: conjunctiva clear  EARS: TM's clear, non-injected, no bulging, retraction, or fluid bilaterally  NOSE: nostrils patent, clear nasal mucus, nasal mucosa pink and boggy  THROAT: oral mucosa pink, moist. Posterior pharynx erythematous and injected. No exudates. Tonsils 2/4.  Uvula is midline.  Breath is not malodorous.  No trismus, hoarseness, muffled voice, or stridor.    NECK: supple  LUNGS: clear to auscultation bilaterally. Breathing is non labored.  CARDIO: RRR without murmur  EXTREMITIES: no cyanosis, clubbing or edema  LYMPH: mild anterior cervical lymphadenopathy, no submandibular lymphadenopathy.  No  posterior cervical or occipital lymphadenopathy.    Recent Results (from the past 24 hours)   Strep A Assay W/Optic    Collection Time: 01/31/25  8:53 AM   Result Value Ref Range    Strep Grp A Screen Negative Negative    Control Line Present with a clear background (yes/no) Yes Yes/No    Kit Lot # [id number] Numeric    Kit Expiration Date 12/20/2025 Date           ASSESSMENT AND PLAN:     Encounter Diagnoses   Name Primary?    Sore throat Yes    Viral upper respiratory tract infection        Orders Placed This Encounter   Procedures    Strep A Assay W/Optic       Meds & Refills for this Visit:  Requested Prescriptions      No prescriptions requested or ordered in this encounter       Imaging & Consults:  None     Testing as above.  Comfort measures explained.   Reviewed quarantine  guidelines.    Follow up with PCP in 3-5 days if not improving, condition worsens, or fever greater than or equal to 100.4 persists for 72 hours.    Verbalized understanding.    Patient Instructions   Your rapid strep was negative in the office today.     Use OTC meds for comfort as needed--  Ibuprofen/Tylenol for fever/pain  Use Benadryl at bedtime to reduce drainage and promote rest.  Zyrtec/Claritin/Allegra in the AM to reduce nasal drainage without sedation.   Use saline nasal sprays to reduce congestion and thin secretions.   Use Delsym for cough.   Consider applying corina's vapo-rub or eucayptus oil to chest and feet at bedtime to reduce chest and nasal congestion.   Warm tea with honey, cough lozenges, vaporizers/steam etc.    If no better in 2-3 days, follow-up with your PCP for further evaluation.

## 2025-02-05 ENCOUNTER — LAB ENCOUNTER (OUTPATIENT)
Dept: LAB | Age: 35
End: 2025-02-05
Attending: STUDENT IN AN ORGANIZED HEALTH CARE EDUCATION/TRAINING PROGRAM
Payer: COMMERCIAL

## 2025-02-05 DIAGNOSIS — O24.419 GESTATIONAL DIABETES MELLITUS (GDM), ANTEPARTUM, GESTATIONAL DIABETES METHOD OF CONTROL UNSPECIFIED (HCC): Primary | ICD-10-CM

## 2025-02-05 DIAGNOSIS — R73.09 ELEVATED GLUCOSE TOLERANCE TEST: ICD-10-CM

## 2025-02-05 LAB
GLUCOSE 1H P GLC SERPL-MCNC: 193 MG/DL
GLUCOSE 2H P GLC SERPL-MCNC: 210 MG/DL
GLUCOSE 3H P GLC SERPL-MCNC: 177 MG/DL (ref 70–140)
GLUCOSE P FAST SERPL-MCNC: 97 MG/DL

## 2025-02-05 PROCEDURE — 82951 GLUCOSE TOLERANCE TEST (GTT): CPT | Performed by: STUDENT IN AN ORGANIZED HEALTH CARE EDUCATION/TRAINING PROGRAM

## 2025-02-05 PROCEDURE — 82952 GTT-ADDED SAMPLES: CPT | Performed by: STUDENT IN AN ORGANIZED HEALTH CARE EDUCATION/TRAINING PROGRAM

## 2025-02-07 NOTE — PROGRESS NOTES
Pt aware of results & recommendations to see diabetic education. Scheduling number given. Verbalized understanding.

## 2025-02-10 ENCOUNTER — ROUTINE PRENATAL (OUTPATIENT)
Facility: CLINIC | Age: 35
End: 2025-02-10
Payer: COMMERCIAL

## 2025-02-10 VITALS
BODY MASS INDEX: 31.65 KG/M2 | HEIGHT: 59 IN | DIASTOLIC BLOOD PRESSURE: 52 MMHG | WEIGHT: 157 LBS | HEART RATE: 95 BPM | SYSTOLIC BLOOD PRESSURE: 102 MMHG

## 2025-02-10 DIAGNOSIS — O09.529 SUPERVISION OF HIGH-RISK PREGNANCY OF ELDERLY MULTIGRAVIDA (HCC): Primary | ICD-10-CM

## 2025-02-10 DIAGNOSIS — Z3A.34 34 WEEKS GESTATION OF PREGNANCY (HCC): ICD-10-CM

## 2025-02-10 PROCEDURE — 3008F BODY MASS INDEX DOCD: CPT | Performed by: STUDENT IN AN ORGANIZED HEALTH CARE EDUCATION/TRAINING PROGRAM

## 2025-02-10 PROCEDURE — 3078F DIAST BP <80 MM HG: CPT | Performed by: STUDENT IN AN ORGANIZED HEALTH CARE EDUCATION/TRAINING PROGRAM

## 2025-02-10 PROCEDURE — 3074F SYST BP LT 130 MM HG: CPT | Performed by: STUDENT IN AN ORGANIZED HEALTH CARE EDUCATION/TRAINING PROGRAM

## 2025-02-10 NOTE — PROGRESS NOTES
Derek 34.0     +FM, no LOF, no VB, no ctx  Recently diagnosed with GDM - has appt scheduled with dietician this Friday      EDC by 8w0d US   - NIPT & Carrier discussed- declined  - msAFP: declined  - L2US nl   -failed 1 hour & 3 hr, see below   -tdap done 12/31/2024     Obesity (pre preg BMI 33)   -nl L2US  - follow up growth w BPP at 32 weeks: EFW 51%ile BPP 8/8  -A1c: 5.6 on 8/21, CMP normal   -32w2d ultrasound: 4 lbs 6 oz (51st percentile)  -limit wt gain 11-20 lbs     GDM  - diagnosed 2/5 with failed 3hr GTT  - has meeting with educator 2/14     AMA at time of delivery  - NST weekly 36 wks     H/o oligohydramnios   -IOL at 41 wks for low fluid   -she was scared of IOL, wanted to wait as long as possible.      PFPT and low back pain  [ ] pelvic PT order placed -\"no time to do it\"        RTC 2 wks, GBS and review glucose logs

## 2025-02-14 ENCOUNTER — DIABETIC EDUCATION (OUTPATIENT)
Facility: CLINIC | Age: 35
End: 2025-02-14
Payer: COMMERCIAL

## 2025-02-14 DIAGNOSIS — O24.419 GESTATIONAL DIABETES MELLITUS (GDM), ANTEPARTUM, GESTATIONAL DIABETES METHOD OF CONTROL UNSPECIFIED (HCC): Primary | ICD-10-CM

## 2025-02-14 PROCEDURE — G0108 DIAB MANAGE TRN  PER INDIV: HCPCS | Performed by: DIETITIAN, REGISTERED

## 2025-02-14 RX ORDER — LANCETS 33 GAUGE
EACH MISCELLANEOUS
Qty: 100 EACH | Refills: 3 | Status: SHIPPED | OUTPATIENT
Start: 2025-02-14

## 2025-02-14 RX ORDER — BLOOD SUGAR DIAGNOSTIC
STRIP MISCELLANEOUS
Qty: 100 STRIP | Refills: 3 | Status: SHIPPED | OUTPATIENT
Start: 2025-02-14

## 2025-02-14 RX ORDER — BLOOD-GLUCOSE METER
EACH MISCELLANEOUS
Qty: 1 KIT | Refills: 0 | Status: SHIPPED | OUTPATIENT
Start: 2025-02-14

## 2025-02-14 NOTE — PATIENT INSTRUCTIONS
Blood Glucose Goals    Start checking your blood sugars 4 times/day:  1.) Fasting (before your first meal of the day)  2.) 2 hours after breakfast  3.) 2 hours after lunch  4.) 2 hours after dinner    Bring your recorded glucose log sheet and food log sheet to your follow up visit in 2 weeks for review.    Ranges:   Fasting: less than 95 mg/dL  2 hours after meal: less than 120 mg/dL    Food Goals    Healthy, well rounded diet with plenty of water.    At least 175 grams of carbohydrates a day.    If 3 full meals is too much for you, aim for small frequent snacks.     Avoid going longer than 5 hours between meals/snacks.    Meal Plan:  Spreading out our carbohydrates throughout the day and adding in small amounts of movement after meals can help us deal with the glucose better.     Breakfast: 30 grams of carbohydrates.  AM Snack: 15 grams of carbohydrates.  Lunch: 45 grams of carbohydrates.  PM Snack: 15 grams of carbohydrates.  Dinner: 45 grams of carbohydrates.  Bedtime Snack: 30 grams of carbohydrates.    Kenia Valle, RD, , LD, CDCES(she/her/hers)  Diabetes Educator  [facility]  [email or web address]  [phone] phone  [phone] Fax

## 2025-02-14 NOTE — PROGRESS NOTES
Regina Rafael Garcias   1990 was seen for Gestational Diabetes Counseling: Individual    Date: 2025  Referring Provider: Tomasa Fenton Start time: 12:45pm End time: 1:30pm      Assessment: LMP 2024 (Exact Date)   Weight:   Wt Readings from Last 6 Encounters:   02/10/25 157 lb   25 163 lb   25 162 lb   25 163 lb   01/15/25 162 lb 3.2 oz   24 160 lb         Ms. Garcias presents today for initial gestational diabetes education. She has had reflux, changed diet and this helped with acid reflux. High stress due to father death and sleep has been affected but she has made diet changes. Questions about fasting and educator encouraged patient not to fast during pregnancy. Questions about black seed during pregnancy and encouraged patient to check mommymeds and talk with OB. Would not recommend taking. Patient states she has severe needle phobia, we discussed CGM options and she would like to pursue dexcom [employer]. Will still send glucometer supplies to pharmacy.     Estimated Date of Delivery: 3/24/25   Gestation: 34w4d  First trimester: 1-13  Second trimester: -  Third trimester: 28-40    History:     OB History    Para Term  AB Living   2 1 1 0 0 1   SAB IAB Ectopic Multiple Live Births   0 0 0 0 1        GDM Screen:   Lab Results   Component Value Date    GLUFG 97 (H) 2025    GLU1G 193 (H) 2025    GLU2G 210 (H) 2025    GLU3G 177 (H) 2025        GLUCOSE 1HR OB   Date Value Ref Range Status   01/15/2025 160 (H) See comment mg/dL Final     Comment:     If the plasma glucose level measured after 1 hour is >=130, 135 or 140 mg/dl proceed to \"Glucose Tolerance, 100 gm (0 hr, 1 hr, 2hr, 3hr), Gestational (ADA)\" test on a separate day, as clinically indicated.            History of GDM:  No  Family history of Type 2 Diabetes: no     Diet & Exercise:     Obtained usual diet history:      TYPICAL  FOOD  NOTES   Breakfast  egg with veggie, milk,  marie seed pudding   Decaf coffee  Sometimes skips      before was not eating- once per day high carb    Lunch  leftover- meat, fish, and veg   Smoothie with pb or almond butter marie seeds and avocado         Dinner  meat and veggie and some carb (sweet pot and quinoa) small bread         Snacks  berries, green veggies,          Beverages  herbal tea, lemonade occasionally, apple cider vinegar in the morning for reflux in water         Eating out               Current physical activity: home activies, back pain and pelvic pain exercises  Sleep: better, but still hard to fall back to sleep       Education:     GDM Overview:  Reviewed gestational diabetes as diagnosis including target blood glucose values.  Benefits, risks, and management options for improving/maintaining glucose control to mother/baby discussed.    Healthy Eating:  Discussed nutrition concepts for pregnancy/healthy eating and effects of food on BG value.  Timing of meals; what is a carbohydrate, protein, fat.  Taught: Carb counting, label reading, meal planning.  Suggested minimal carb intake of 175 gm.    Being Active:  Benefits and effects of activity on BG discussed.  Reviewed types of recommended activity, duration, precautions, and when to call MD.    Monitoring:  Instructed on how to use glucose monitor/proper lancet disposal. Checking schedules are:   Fastin-95 mg/dL, Call MD is >105 md/dL twice in 1 week   2 Hour Post Prandial:  Less than 120 md/dL, Call MD if >140 md/dL twice in 1 week.    Pt came in with a a glucose meter:  no  Instructed /demonstrated ability to perform blood glucose checking on:       Taking Medication:  Reviewed when medication might be indicated.    Reducing Risk:  Effects of elevated blood glucose on mother/baby reviewed.  Discussed management (hyperglycemia, hypoglycemia, sick day, other) and when to call provider.  Post pregnancy management/prevention of Type 2 DM, and increased risk of having diabetes later in  life reviewed.    Healthy Coping:  Family involvement/social support encouraged.  Identification of lifestyle behaviors willing to change discussed.    Training Tools Provided:   handout.  BG Log Sheets    Recommendations:      1. Follow recommended meal plan.   2. Begin checking fasting glucose and 2 hour after meals   3. Bring glucose / food log to next visit with diabetes educator. Bring glucose log sheets to MD office visits.   4. Encouraged activity if no restrictions.   5. Encouraged Meetra to contact the diabetes center with any questions or concerns.    Glucose meter kit, test strips and lancets sent to preferred pharmacy.    Patient verbalized understanding and has no further questions at this time.  Emailed/written materials provided for all topics covered.    Scheduled pt to follow-up x 1 with the Diabetes Center 2/27/2025   Future Appointments   Date Time Provider Department Center   2/25/2025 12:00 PM [facility] Wallace [id number]   2/27/2025  9:00 AM Kenia Valle RD [facility]       Kenia Valle RD, , LD, CDCES

## 2025-02-25 ENCOUNTER — ROUTINE PRENATAL (OUTPATIENT)
Dept: OBGYN CLINIC | Facility: CLINIC | Age: 35
End: 2025-02-25
Payer: COMMERCIAL

## 2025-02-25 ENCOUNTER — TELEPHONE (OUTPATIENT)
Dept: PERINATAL CARE | Facility: HOSPITAL | Age: 35
End: 2025-02-25

## 2025-02-25 ENCOUNTER — TELEPHONE (OUTPATIENT)
Facility: CLINIC | Age: 35
End: 2025-02-25

## 2025-02-25 VITALS
BODY MASS INDEX: 32.32 KG/M2 | WEIGHT: 160.31 LBS | DIASTOLIC BLOOD PRESSURE: 70 MMHG | SYSTOLIC BLOOD PRESSURE: 108 MMHG | HEIGHT: 59 IN | HEART RATE: 92 BPM

## 2025-02-25 DIAGNOSIS — O99.210 OBESITY AFFECTING PREGNANCY, ANTEPARTUM, UNSPECIFIED OBESITY TYPE (HCC): ICD-10-CM

## 2025-02-25 DIAGNOSIS — Z34.90 PRENATAL CARE, ANTEPARTUM (HCC): ICD-10-CM

## 2025-02-25 DIAGNOSIS — O24.419 GESTATIONAL DIABETES MELLITUS (GDM) IN THIRD TRIMESTER, GESTATIONAL DIABETES METHOD OF CONTROL UNSPECIFIED (HCC): ICD-10-CM

## 2025-02-25 DIAGNOSIS — O09.523 AMA (ADVANCED MATERNAL AGE) MULTIGRAVIDA 35+, THIRD TRIMESTER (HCC): Primary | ICD-10-CM

## 2025-02-25 PROCEDURE — 87150 DNA/RNA AMPLIFIED PROBE: CPT | Performed by: STUDENT IN AN ORGANIZED HEALTH CARE EDUCATION/TRAINING PROGRAM

## 2025-02-25 PROCEDURE — 3078F DIAST BP <80 MM HG: CPT | Performed by: STUDENT IN AN ORGANIZED HEALTH CARE EDUCATION/TRAINING PROGRAM

## 2025-02-25 PROCEDURE — 87081 CULTURE SCREEN ONLY: CPT | Performed by: STUDENT IN AN ORGANIZED HEALTH CARE EDUCATION/TRAINING PROGRAM

## 2025-02-25 PROCEDURE — 3074F SYST BP LT 130 MM HG: CPT | Performed by: STUDENT IN AN ORGANIZED HEALTH CARE EDUCATION/TRAINING PROGRAM

## 2025-02-25 PROCEDURE — 3008F BODY MASS INDEX DOCD: CPT | Performed by: STUDENT IN AN ORGANIZED HEALTH CARE EDUCATION/TRAINING PROGRAM

## 2025-02-25 PROCEDURE — 59025 FETAL NON-STRESS TEST: CPT | Performed by: STUDENT IN AN ORGANIZED HEALTH CARE EDUCATION/TRAINING PROGRAM

## 2025-02-25 NOTE — PROGRESS NOTES
PETEY - 36w1d     Pt's deandre is trying to come into the country and she is asking for a letter to help him with [place]  Cervix - couldn't quite reach due to pt discomfort, posterior cervix, if no ultrasound by next visit and cannot confirm vertex would get US    EDC by 8w0d US   - NIPT & Carrier discussed- declined  - msAFP: declined  - L2US nl   -failed 1 hour & 3 hr, see below   -tdap done 12/31/2024  -HIV, syphilis screen done  -GBS done     Obesity (pre preg BMI 33)   -nl L2US  - follow up growth w BPP at 32 weeks: EFW 51%ile BPP 8/8  -A1c: 5.6 on 8/21, CMP normal   -32w2d ultrasound: 4 lbs 6 oz (51st percentile)  -limit wt gain 11-20 lbs   -NSTs weekly at 36wk    GDM  - diagnosed 2/5 with failed 3hr GTT  - saw educator 2/14  - as of 36w1d reporting at least 50% of sugars are above goal. Pt recommended MFM eval for possible insulin start, maybe growth ultrasound. D/w Dr Chacon and they will get her in. Will order referral     AMA at time of delivery  - NST weekly 36 wks     H/o oligohydramnios   -IOL at 41 wks for low fluid   -she was scared of IOL, wanted to wait as long as possible.      PFPT and low back pain  [ ] pelvic PT order placed -\"no time to do it\"

## 2025-02-25 NOTE — TELEPHONE ENCOUNTER
Spoke with Melany from [facility]. They were able to offer patient for diabetic consult and growth on 2/26/25 and also on 2/28/25. However, patient declined. This week was not going to work for her. She scheduled appointment for 3/4/25.     Routed to Dr Wallace to review.

## 2025-02-27 LAB — GROUP B STREP BY PCR FOR PCR OVT: POSITIVE

## 2025-03-03 NOTE — PROGRESS NOTES
Outpatient Maternal-Fetal Medicine Follow-Up     Dear Dr. Rivera,     Thank you for requesting ultrasound evaluation and maternal fetal medicine consultation on your patient Regina Garcias.  As you are aware she is a 35 year old female  with a Wheat pregnancy and an Estimated Date of Delivery: 3/24/25.  She returned to maternal-fetal medicine today for a follow-up visit.  Her history was reviewed from her prior visit and there were no interval changes.     Antepartum Risk Factors  AMA  Obesity complicating pregnancy  GDM    S: She reports good fetal movement.  She is complaining that she has no time to exercise and that her body hurts everywhere.  When asked to isolate the main things at her is her back and pelvis.  She is trying to follow the diet but she is currently a single mother and has to drive her son to school and finds that she has almost no time the day between work and taking care of her son to take care of herself.    PHYSICAL EXAMINATION:  /70 (BP Location: Right arm, Patient Position: Sitting, Cuff Size: adult)   Pulse 83   Ht 4' 11\" (1.499 m)   Wt 162 lb (73.5 kg)   LMP 2024 (Exact Date)   BMI 32.72 kg/m²   General: alert and oriented, no acute distress  Abdomen: gravid, soft, non-tender  Extremities: non-tender, no edema     OBSTETRIC ULTRASOUND  The patient had a follow up fetal ultrasound today which I interpreted the results and reviewed them with the patient.    Ultrasound Findings:  Single IUP in cephalic presentation.    Placenta is posterior.   Cardiac activity is present at 131 bpm  EFW 3049 g ( 6 lb 12 oz); 48%.    BRYNN is  14.1 cm.  MVP is 5 cm  BPP is 8/8.     The fetal measurements are consistent with established EDC. No gross ultrasound evidence of structural abnormalities are seen today. The patient understands that ultrasound cannot rule out all structural and chromosomal abnormalities.     See imaging tab for the complete US report.     During her  visit we discussed and reviewed the following issues:  ADVANCED MATERNAL AGE     Background  I reviewed with the patient that pregnancies in women of advanced maternal age (35 or older at delivery) are associated with elevated risks. Specifically, there is a higher rate of:  Fetal malformations  Preeclampsia  Gestational diabetes  Intrauterine fetal death   Please see previous [facility] detailed discussion.         OBESITY:  Her BMI prior to pregnancy was 32 Please see previous [facility] detailed discussion.      Prevention of Preeclampsia   Please see previous [facility] detailed discussion.      Declines aspirin therapy for preeclampsia prevention.        GESTATIONAL DIABETES    3 hour GTT  Lab Results   Component Value Date    GLUFG 97 (H) 02/05/2025    GLU1G 193 (H) 02/05/2025    GLU2G 210 (H) 02/05/2025    GLU3G 177 (H) 02/05/2025       The patient was informed of the potential implications and risks associated with GDM to her and her fetus, especially when poorly controlled. We discussed the increased incidence of macrosomia and the potential for related birth injury to her and her baby. We talked about the increase risks associated risk of fetal hyperinsulinemia, jaundice, electrolyte imbalance, seizure activity, IUFD and other adverse obstetric outcomes. We also reviewed her  increased risk of having diabetes later in life. The importance of good glycemic control and avoidance of prolonged hypoglycemia and hyperglycemia was addressed.    She was instructed on the diabetic diet; her diet was modified to provide three meals and three snacks with fewer carbohydrates.  She received instruction on self-monitoring of blood glucose.  She was advised to assess  her blood sugars four times daily (fasting and 2 hour postprandially).   Fasting blood glucose should be less than 95 mg/dl and two hour postprandial values should be less than 120 mg/dl.   She was also  Advised to send her capillary blood glucose records to our office for  weekly MFM review. Insulin therapy may be started depending on her blood sugar levels.    Her capillary blood glucose records were reviewed today; her compliance with the recommended four times daily assessments (fasting and two-hour post-prandial) is excellent.   Her overall glucose control is poor.    The patient is presently on diet therapy; her compliance with her diabetic diet regimen was reviewed and it is fair.     We compared and contrasted insulin (long and short acting) and metformin to manage blood sugars in pregnancy.  We discussed insulin as the gold standard therapy in pregnancy and that it does not cross to the fetal circulation.  Metformin is increasingly being used in pregnancy but the long term data on / childhood effects are lacking.  Metformin crosses the placenta and  levels are >70% of the maternal level at delivery.  Metformin is not associated with increased rates for NICU admission,  hypoglycemia or LGA when its use controls the blood sugars to the recommended targets.  There is emerging information,however, that there is an increase in childhood obesity, and possibly metabolic syndrome in children exposed to Metformin throughout the entire pregnancy.  Currently, use in the third trimester for management of gestational diabetes is within the standard of care.     Based on the above discussion, and the need for improved glycemic control  Regina would prefer to start on metformin.      Medical Regimen Recommendation:   Continue ADA diet & blood sugar monitoring 4 times daily -send to [facility] weekly for review  Metformin 500 mg BID (before breakfast and before dinner)    We discussed the recommended plan of care based on her  risk factors.  Regina had her questions answered to her satisfaction.  I showed her some physical therapy type exercises to help with her pelvic and low back pain        IMPRESSION:  IUP at 37w1d   Normal fetal growth & BPP  AMA Declines  aneuploidy screening and invasive testing  Obesity  Gestational diabetes, A2     RECOMMENDATIONS:  Continue care with Dr. Rivera  Twice weekly NST  Delivery is advised at 38-39+ wks  She is to send her blood sugars to [facility] weekly  Metformin 500 mg twice daily           Total time spent 40 minutes this calendar day which includes preparing to see the patient including chart review, obtaining and/or reviewing additional medical history, performing a physical exam and evaluation, documenting clinical information in the electronic medical record, independently interpreting results, counseling the patient, communicating results to the patient/family/caregiver and coordinating care.     Case discussed with patient who demonstrated understanding and agreement with plan.     Thank you for allowing me to participate in the care of this patient.  Please feel free to contact me with any questions.    Maude Chacon MD  Maternal-Fetal Medicine       Note to patient and family:  The 21st Century Cures Act makes medical notes available to patients in the interest of transparency.  However, please be advised that this is a medical document.  It is intended as a peer to peer communication.  It is written in medical language and may contain abbreviations or verbiage that are technical and unfamiliar.  It may appear blunt or direct.  Medical documents are intended to carry relevant information, facts as evident, and the clinical opinion of the practitioner.

## 2025-03-04 ENCOUNTER — HOSPITAL ENCOUNTER (OUTPATIENT)
Facility: HOSPITAL | Age: 35
Discharge: HOME | End: 2025-03-04
Attending: STUDENT IN AN ORGANIZED HEALTH CARE EDUCATION/TRAINING PROGRAM | Admitting: STUDENT IN AN ORGANIZED HEALTH CARE EDUCATION/TRAINING PROGRAM
Payer: COMMERCIAL

## 2025-03-04 ENCOUNTER — ROUTINE PRENATAL (OUTPATIENT)
Facility: CLINIC | Age: 35
End: 2025-03-04
Payer: COMMERCIAL

## 2025-03-04 ENCOUNTER — ULTRASOUND ENCOUNTER (OUTPATIENT)
Dept: PERINATAL CARE | Facility: HOSPITAL | Age: 35
End: 2025-03-04
Attending: OBSTETRICS & GYNECOLOGY
Payer: COMMERCIAL

## 2025-03-04 VITALS
WEIGHT: 162 LBS | DIASTOLIC BLOOD PRESSURE: 70 MMHG | SYSTOLIC BLOOD PRESSURE: 107 MMHG | HEART RATE: 83 BPM | HEIGHT: 59 IN | BODY MASS INDEX: 32.66 KG/M2

## 2025-03-04 VITALS
HEIGHT: 59 IN | WEIGHT: 162 LBS | HEART RATE: 88 BPM | BODY MASS INDEX: 32.66 KG/M2 | DIASTOLIC BLOOD PRESSURE: 60 MMHG | SYSTOLIC BLOOD PRESSURE: 108 MMHG

## 2025-03-04 VITALS
RESPIRATION RATE: 18 BRPM | DIASTOLIC BLOOD PRESSURE: 68 MMHG | SYSTOLIC BLOOD PRESSURE: 103 MMHG | WEIGHT: 162 LBS | TEMPERATURE: 98 F | BODY MASS INDEX: 33 KG/M2 | HEART RATE: 96 BPM

## 2025-03-04 DIAGNOSIS — E66.09 OTHER OBESITY DUE TO EXCESS CALORIES AFFECTING PREGNANCY IN THIRD TRIMESTER (HCC): ICD-10-CM

## 2025-03-04 DIAGNOSIS — O09.523 MULTIGRAVIDA OF ADVANCED MATERNAL AGE IN THIRD TRIMESTER (HCC): Primary | ICD-10-CM

## 2025-03-04 DIAGNOSIS — O09.523 AMA (ADVANCED MATERNAL AGE) MULTIGRAVIDA 35+, THIRD TRIMESTER (HCC): ICD-10-CM

## 2025-03-04 DIAGNOSIS — O24.415 GESTATIONAL DIABETES MELLITUS (GDM) IN THIRD TRIMESTER CONTROLLED ON ORAL HYPOGLYCEMIC DRUG (HCC): ICD-10-CM

## 2025-03-04 DIAGNOSIS — O24.410 DIET CONTROLLED GESTATIONAL DIABETES MELLITUS (GDM) IN THIRD TRIMESTER (HCC): ICD-10-CM

## 2025-03-04 DIAGNOSIS — O99.213 OTHER OBESITY DUE TO EXCESS CALORIES AFFECTING PREGNANCY IN THIRD TRIMESTER (HCC): ICD-10-CM

## 2025-03-04 DIAGNOSIS — O24.419 GESTATIONAL DIABETES MELLITUS (GDM) IN THIRD TRIMESTER, GESTATIONAL DIABETES METHOD OF CONTROL UNSPECIFIED (HCC): ICD-10-CM

## 2025-03-04 DIAGNOSIS — O99.810 PREGNANCY-INDUCED GLUCOSE INTOLERANCE (HCC): ICD-10-CM

## 2025-03-04 DIAGNOSIS — O09.523 MULTIGRAVIDA OF ADVANCED MATERNAL AGE IN THIRD TRIMESTER (HCC): ICD-10-CM

## 2025-03-04 DIAGNOSIS — O24.414 INSULIN CONTROLLED GESTATIONAL DIABETES MELLITUS (GDM) IN THIRD TRIMESTER (HCC): ICD-10-CM

## 2025-03-04 DIAGNOSIS — O24.415 GESTATIONAL DIABETES MELLITUS (GDM) IN THIRD TRIMESTER CONTROLLED ON ORAL HYPOGLYCEMIC DRUG (HCC): Primary | ICD-10-CM

## 2025-03-04 DIAGNOSIS — O36.8390 FETAL ARRHYTHMIA AFFECTING PREGNANCY, ANTEPARTUM (HCC): Primary | ICD-10-CM

## 2025-03-04 PROCEDURE — 76819 FETAL BIOPHYS PROFIL W/O NST: CPT

## 2025-03-04 PROCEDURE — 3078F DIAST BP <80 MM HG: CPT

## 2025-03-04 PROCEDURE — 3008F BODY MASS INDEX DOCD: CPT

## 2025-03-04 PROCEDURE — 76816 OB US FOLLOW-UP PER FETUS: CPT | Performed by: OBSTETRICS & GYNECOLOGY

## 2025-03-04 PROCEDURE — 3074F SYST BP LT 130 MM HG: CPT

## 2025-03-04 PROCEDURE — 59025 FETAL NON-STRESS TEST: CPT

## 2025-03-04 PROCEDURE — 99213 OFFICE O/P EST LOW 20 MIN: CPT

## 2025-03-04 NOTE — DISCHARGE INSTRUCTIONS
Discharge Instructions    Diet: diabetic diet-  drink plenty of fluids            General Instructions    Call your OB doctor if: Fluid leaking from your vagina;Decrease in fetal movement;Vaginal or rectal pressure;Uterine contractions 10 minutes or closer for 1 to 2 hours;Uterine contractions increasing in intensity and frequency;Temperature greater than 100F;Vaginal bleeding

## 2025-03-04 NOTE — PROGRESS NOTES
Derek 37w1d    Dw/pt MFM recommendations to be IOL at 38-39 wks. She does not want IOL, she prefers to have a primary C/S. Advise her to have this discussion with OBs at next visit.   Her f[place] - is requesting a letter with the new recommendations added.     NST: reactive. I heard a skipping of beats when on EFM. Sent to L&D for further monitoring.     EDC by 8w0d US   - NIPT & Carrier discussed- declined  - msAFP: declined  - L2US nl   -failed 1 hour & 3 hr, see below   -tdap done 12/31/2024  -HIV, syphilis screen done  -GBS done     Obesity (pre preg BMI 33)   -nl L2US  - follow up growth w BPP at 32 weeks: EFW 51%ile BPP 8/8  -37w1d : efw 48% BPP 8/8  -A1c: 5.6 on 8/21, CMP normal   -32w2d ultrasound: 4 lbs 6 oz (51st percentile)  -limit wt gain 11-20 lbs   -NSTs weekly at 36wk     GDM  - diagnosed 2/5 with failed 3hr GTT  - saw educator 2/14  - as of 36w1d reporting at least 50% of sugars are above goal.   -Is sending BS to MFM weekly, was just prescribed today metformin 500 mg BID   -delivery at 38-39 wks      AMA at time of delivery  - NST weekly 36 wks     H/o oligohydramnios   -IOL at 41 wks for low fluid   -she was scared of IOL, wanted to wait as long as possible.      PFPT and low back pain  [ ] pelvic PT order placed -\"no time to do it\"     Will need to start twice weekly NSTs

## 2025-03-04 NOTE — PROGRESS NOTES
Pt is a 35 year old female admitted to [facility].     Chief Complaint   Patient presents with     Assessment     Patient saw mfm this morning, normal checkup and then saw ob this afternoon and irregular fetal heart rate heard on monitor, sent for eval. Active fetal movement , having jacob ashraf but nothing more than normal, denies any vaginal leaking of fluid or bleeding .  Audible irregular hr heard on monitor in triage        Pt is  37w1d intra-uterine pregnancy.  History obtained, consents signed. Oriented to room, staff, and plan of care.

## 2025-03-04 NOTE — NST
Nonstress Test   Patient: Regina Hall Ameni    Gestation: 37w1d    NST:       Variability: Moderate           Accelerations: Yes           Decelerations: None            Baseline: 130 BPM           Uterine Irritability: No           Contractions: Irregular                                        Acoustic Stimulator: No           Nonstress Test Interpretation: Reactive           Nonstress Test Second Interpretation: Reactive                     Additional Comments

## 2025-03-04 NOTE — PROGRESS NOTES
Pt here for growth ultrasound/diabetic consult  + FM  Pt c/o painful irreg uterine cramping. Pt denies vag bleeding and leaking fluid.

## 2025-03-06 ENCOUNTER — ROUTINE PRENATAL (OUTPATIENT)
Dept: OBGYN CLINIC | Facility: CLINIC | Age: 35
End: 2025-03-06
Payer: COMMERCIAL

## 2025-03-06 VITALS
WEIGHT: 162.19 LBS | SYSTOLIC BLOOD PRESSURE: 106 MMHG | HEIGHT: 59 IN | HEART RATE: 100 BPM | BODY MASS INDEX: 32.7 KG/M2 | DIASTOLIC BLOOD PRESSURE: 68 MMHG

## 2025-03-06 DIAGNOSIS — O24.415 GESTATIONAL DIABETES MELLITUS (GDM) IN THIRD TRIMESTER CONTROLLED ON ORAL HYPOGLYCEMIC DRUG (HCC): Primary | ICD-10-CM

## 2025-03-06 PROCEDURE — 3078F DIAST BP <80 MM HG: CPT | Performed by: STUDENT IN AN ORGANIZED HEALTH CARE EDUCATION/TRAINING PROGRAM

## 2025-03-06 PROCEDURE — 59025 FETAL NON-STRESS TEST: CPT | Performed by: STUDENT IN AN ORGANIZED HEALTH CARE EDUCATION/TRAINING PROGRAM

## 2025-03-06 PROCEDURE — 3008F BODY MASS INDEX DOCD: CPT | Performed by: STUDENT IN AN ORGANIZED HEALTH CARE EDUCATION/TRAINING PROGRAM

## 2025-03-06 PROCEDURE — 3074F SYST BP LT 130 MM HG: CPT | Performed by: STUDENT IN AN ORGANIZED HEALTH CARE EDUCATION/TRAINING PROGRAM

## 2025-03-06 NOTE — PROGRESS NOTES
Derek - 37w3d     Discussed delivery planning, see below, pt very much wants to avoid pitocin  For now observe and try membrane sweeping next visit ( [place])    EDC by 8w0d US   - NIPT & Carrier discussed- declined  - msAFP: declined  - L2US nl   -failed 1 hour & 3 hr, see below   -tdap done 12/31/2024  -HIV, syphilis screen done  -GBS done     Obesity (pre preg BMI 33)   -nl L2US  - follow up growth w BPP at 32 weeks: EFW 51%ile BPP 8/8  -37w1d : efw 48% BPP 8/8  -A1c: 5.6 on 8/21, CMP normal   -32w2d ultrasound: 4 lbs 6 oz (51st percentile)  -limit wt gain 11-20 lbs   -NSTs weekly at 36wk     GDM on medication  - diagnosed 2/5 with failed 3hr GTT  - saw educator 2/14  - as of 36w1d reporting at least 50% of sugars are above goal.   -Is sending BS to [facility] weekly, was just prescribed  metformin 500 mg BID as of 37wk  -MFM US 3/4  with EFW 48%ile, vertex, normal fluid  -twice weekly NST  -delivery at 38-39+ wks   -3/6 visit: pt is very nervous about IOL with pitocin. Last delivery she felt the pitocin made her feel \"tense\" for months afterwards, d/w pt that is not a typical side effect, but she wants to avoid IOL if at all possible, even considering primary C section. D/w pt that C section has more risk than IOL. She does not want to schedule anything yet, is hopeful she will go into labor on her own. Would be open to membrane sweeping at 38wk     Fetal PAC vs PVCs  -skipped beats heard on NST 3/4, had just had BPP 8/8 with MFM, monitoring on L&D with reactive and reassuring NST, tracing well but still audible arrhythmia, discussed with MFM and did not recommend further intervention given normal tracing, no tachycardia  -as of 3/6 - reactive NST, no audible arrhythmia     AMA at time of delivery  - NST weekly 36 wks     H/o oligohydramnios   -IOL at 41 wks for low fluid   -she was scared of IOL, wanted to wait as long as possible.      PFPT and low back pain  [ ] pelvic PT  order placed -\"no time to do it\"     RTC for 2x/wk NSTs   Appendix

## 2025-03-10 ENCOUNTER — ROUTINE PRENATAL (OUTPATIENT)
Dept: OBGYN CLINIC | Facility: CLINIC | Age: 35
End: 2025-03-10
Payer: COMMERCIAL

## 2025-03-10 VITALS
HEIGHT: 59 IN | HEART RATE: 94 BPM | WEIGHT: 162.38 LBS | DIASTOLIC BLOOD PRESSURE: 67 MMHG | SYSTOLIC BLOOD PRESSURE: 103 MMHG | BODY MASS INDEX: 32.73 KG/M2

## 2025-03-10 DIAGNOSIS — O24.415 GESTATIONAL DIABETES MELLITUS (GDM) IN THIRD TRIMESTER CONTROLLED ON ORAL HYPOGLYCEMIC DRUG (HCC): ICD-10-CM

## 2025-03-10 DIAGNOSIS — O09.523 AMA (ADVANCED MATERNAL AGE) MULTIGRAVIDA 35+, THIRD TRIMESTER (HCC): ICD-10-CM

## 2025-03-10 DIAGNOSIS — O22.43: Primary | ICD-10-CM

## 2025-03-10 NOTE — PROGRESS NOTES
Derek 38w0d     She was unable to sleep last night. Her hemorrhoids are painful, per pt she has to push the internal hemorrhoid in and now the external hemorrhoids looks like a cluster. She took two ibuprofen last night. Dw/pt NSAID not recommended in pregnancy, she can take Tylenol. Rx Proctofoam 1% Rx sent to pharmacy.   -Dw/pt to schedule IOL , wants to wait for next visit to decide. Her fiance is trying to [place] border and he is due to enter this Friday 3/14/25.     NST reactive, no audible arrhythmia noted     EDC by 8w0d US   - NIPT & Carrier discussed- declined  - msAFP: declined  - L2US nl   -failed 1 hour & 3 hr, see below   -tdap done 12/31/2024  -HIV, syphilis screen done  -GBS done     Obesity (pre preg BMI 33)   -nl L2US  - follow up growth w BPP at 32 weeks: EFW 51%ile BPP 8/8  -37w1d : efw 48% BPP 8/8  -A1c: 5.6 on 8/21, CMP normal   -32w2d ultrasound: 4 lbs 6 oz (51st percentile)  -limit wt gain 11-20 lbs   -NSTs weekly at 36wk     GDM on medication  - diagnosed 2/5 with failed 3hr GTT  - saw educator 2/14  - as of 36w1d reporting at least 50% of sugars are above goal.   -Is sending BS to MFM weekly, was just prescribed  metformin 500 mg BID as of 37wk  -MFM US 3/4  with EFW 48%ile, vertex, normal fluid  -twice weekly NST  -delivery at 38-39+ wks   -3/6 visit: pt is very nervous about IOL with pitocin. Last delivery she felt the pitocin made her feel \"tense\" for months afterwards, d/w pt that is not a typical side effect, but she wants to avoid IOL if at all possible, even considering primary C section. D/w pt that C section has more risk than IOL. She does not want to schedule anything yet, is hopeful she will go into labor on her own. Would be open to membrane sweeping at 38wk  -3/10/25 -membrane sweep discussed, declines today.      Fetal PAC vs PVCs  -skipped beats heard on NST 3/4, had just had BPP 8/8 with MFM, monitoring on L&D with reactive and reassuring NST, tracing well but still audible  arrhythmia, discussed with MFM and did not recommend further intervention given normal tracing, no tachycardia  -as of 3/6 - reactive NST, no audible arrhythmia      AMA at time of delivery  - NST weekly 36 wks     H/o oligohydramnios   -IOL at 41 wks for low fluid   -she was scared of IOL, wanted to wait as long as possible.      PFPT and low back pain  [ ] pelvic PT order placed -\"no time to do it\"

## 2025-03-13 ENCOUNTER — ROUTINE PRENATAL (OUTPATIENT)
Facility: CLINIC | Age: 35
End: 2025-03-13
Payer: COMMERCIAL

## 2025-03-13 ENCOUNTER — TELEPHONE (OUTPATIENT)
Facility: CLINIC | Age: 35
End: 2025-03-13

## 2025-03-13 VITALS
HEIGHT: 59 IN | DIASTOLIC BLOOD PRESSURE: 60 MMHG | WEIGHT: 158 LBS | BODY MASS INDEX: 31.85 KG/M2 | SYSTOLIC BLOOD PRESSURE: 102 MMHG | HEART RATE: 98 BPM

## 2025-03-13 DIAGNOSIS — O09.523 AMA (ADVANCED MATERNAL AGE) MULTIGRAVIDA 35+, THIRD TRIMESTER (HCC): Primary | ICD-10-CM

## 2025-03-13 DIAGNOSIS — O24.415 GESTATIONAL DIABETES MELLITUS (GDM) IN THIRD TRIMESTER CONTROLLED ON ORAL HYPOGLYCEMIC DRUG (HCC): ICD-10-CM

## 2025-03-13 PROCEDURE — 3008F BODY MASS INDEX DOCD: CPT

## 2025-03-13 PROCEDURE — 3074F SYST BP LT 130 MM HG: CPT

## 2025-03-13 PROCEDURE — 59025 FETAL NON-STRESS TEST: CPT

## 2025-03-13 PROCEDURE — 3078F DIAST BP <80 MM HG: CPT

## 2025-03-13 NOTE — PROGRESS NOTES
Derek 38w3d     Her partner was deported. She does have a support person who will stay with her for a week for the delivery.  Sent message to  to for Cytotec.   Eloina today closed/50/-2 cephalic   Reminded her to upload bs to [facility]  Is requesting work excuse note from 3/12-3/14. She is upset her partner is deported and is having an emotional difficult time.     NST reactive     EDC by 8w0d US   - NIPT & Carrier discussed- declined  - msAFP: declined  - L2US nl   -failed 1 hour & 3 hr, see below   -tdap done 12/31/2024  -HIV, syphilis screen done  -GBS done     Obesity (pre preg BMI 33)   -nl L2US  - follow up growth w BPP at 32 weeks: EFW 51%ile BPP 8/8  -37w1d : efw 48% BPP 8/8  -A1c: 5.6 on 8/21, CMP normal   -32w2d ultrasound: 4 lbs 6 oz (51st percentile)  -limit wt gain 11-20 lbs   -NSTs weekly at 36wk     GDM on medication  - diagnosed 2/5 with failed 3hr GTT  - saw educator 2/14  - as of 36w1d reporting at least 50% of sugars are above goal.   -Is sending BS to [facility] weekly, was just prescribed  metformin 500 mg BID as of 37wk  -MFM US 3/4  with EFW 48%ile, vertex, normal fluid  -twice weekly NST  -delivery at 38-39+ wks   -3/6 visit: pt is very nervous about IOL with pitocin. Last delivery she felt the pitocin made her feel \"tense\" for months afterwards, d/w pt that is not a typical side effect, but she wants to avoid IOL if at all possible, even considering primary C section. D/w pt that C section has more risk than IOL. She does not want to schedule anything yet, is hopeful she will go into labor on her own. Would be open to membrane sweeping at 38wk  -3/10/25 -membrane sweep discussed, declines today.      Fetal PAC vs PVCs  -skipped beats heard on NST 3/4, had just had BPP 8/8 with MFM, monitoring on L&D with reactive and reassuring NST, tracing well but still audible arrhythmia, discussed with MFM and did not recommend further intervention given normal tracing, no tachycardia  -as of 3/6 - reactive NST,  no audible arrhythmia      AMA at time of delivery  - NST weekly 36 wks     H/o oligohydramnios   -IOL at 41 wks for low fluid   -she was scared of IOL, wanted to wait as long as possible.      PFPT and low back pain  [ ] pelvic PT order placed -\"no time to do it\"

## 2025-03-13 NOTE — TELEPHONE ENCOUNTER
----- Message from Rose Cevallos sent at 3/13/2025  1:55 PM CDT -----  Regarding: cytotec IOL for GDM on metformin  Please schedule her for 3/16 for cytotec. She needs to be delivered between 38 -39wk+ per MFM.

## 2025-03-14 ENCOUNTER — ORDERS FOR ADMISSION (OUTPATIENT)
Dept: PERINATAL CARE | Facility: HOSPITAL | Age: 35
End: 2025-03-14

## 2025-03-14 RX ORDER — DEXTROSE MONOHYDRATE 25 G/50ML
50 INJECTION, SOLUTION INTRAVENOUS
OUTPATIENT
Start: 2025-03-14

## 2025-03-14 RX ORDER — DEXTROSE MONOHYDRATE 25 G/50ML
50 INJECTION, SOLUTION INTRAVENOUS
Status: CANCELLED | OUTPATIENT
Start: 2025-03-14

## 2025-03-14 RX ORDER — NICOTINE POLACRILEX 4 MG
15 LOZENGE BUCCAL
OUTPATIENT
Start: 2025-03-14

## 2025-03-14 RX ORDER — SODIUM CHLORIDE 9 MG/ML
25 INJECTION, SOLUTION INTRAVENOUS CONTINUOUS PRN
Status: CANCELLED | OUTPATIENT
Start: 2025-03-14

## 2025-03-14 RX ORDER — NICOTINE POLACRILEX 4 MG
30 LOZENGE BUCCAL
OUTPATIENT
Start: 2025-03-14

## 2025-03-14 RX ORDER — DEXTROSE, SODIUM CHLORIDE, SODIUM LACTATE, POTASSIUM CHLORIDE, AND CALCIUM CHLORIDE 5; .6; .31; .03; .02 G/100ML; G/100ML; G/100ML; G/100ML; G/100ML
50 INJECTION, SOLUTION INTRAVENOUS CONTINUOUS PRN
Status: CANCELLED | OUTPATIENT
Start: 2025-03-14

## 2025-03-14 RX ORDER — NICOTINE POLACRILEX 4 MG
30 LOZENGE BUCCAL
Status: CANCELLED | OUTPATIENT
Start: 2025-03-14

## 2025-03-14 RX ORDER — SODIUM CHLORIDE, SODIUM LACTATE, POTASSIUM CHLORIDE, CALCIUM CHLORIDE 600; 310; 30; 20 MG/100ML; MG/100ML; MG/100ML; MG/100ML
INJECTION, SOLUTION INTRAVENOUS CONTINUOUS PRN
Status: CANCELLED | OUTPATIENT
Start: 2025-03-14

## 2025-03-14 RX ORDER — NICOTINE POLACRILEX 4 MG
15 LOZENGE BUCCAL
Status: CANCELLED | OUTPATIENT
Start: 2025-03-14

## 2025-03-17 ENCOUNTER — TELEPHONE (OUTPATIENT)
Dept: OBGYN UNIT | Facility: HOSPITAL | Age: 35
End: 2025-03-17

## 2025-03-17 ENCOUNTER — HOSPITAL ENCOUNTER (INPATIENT)
Facility: HOSPITAL | Age: 35
LOS: 3 days | Discharge: HOME | End: 2025-03-20
Attending: STUDENT IN AN ORGANIZED HEALTH CARE EDUCATION/TRAINING PROGRAM | Admitting: STUDENT IN AN ORGANIZED HEALTH CARE EDUCATION/TRAINING PROGRAM
Payer: COMMERCIAL

## 2025-03-17 ENCOUNTER — APPOINTMENT (OUTPATIENT)
Dept: OBGYN CLINIC | Facility: HOSPITAL | Age: 35
End: 2025-03-17
Payer: COMMERCIAL

## 2025-03-17 ENCOUNTER — ROUTINE PRENATAL (OUTPATIENT)
Dept: OBGYN CLINIC | Facility: CLINIC | Age: 35
End: 2025-03-17
Payer: COMMERCIAL

## 2025-03-17 VITALS
HEART RATE: 97 BPM | WEIGHT: 161 LBS | BODY MASS INDEX: 32.46 KG/M2 | DIASTOLIC BLOOD PRESSURE: 71 MMHG | HEIGHT: 59 IN | SYSTOLIC BLOOD PRESSURE: 113 MMHG

## 2025-03-17 DIAGNOSIS — O24.415 GESTATIONAL DIABETES MELLITUS (GDM) IN THIRD TRIMESTER CONTROLLED ON ORAL HYPOGLYCEMIC DRUG (HCC): Primary | ICD-10-CM

## 2025-03-17 PROBLEM — Z34.90 PREGNANCY (HCC): Status: ACTIVE | Noted: 2025-03-17

## 2025-03-17 LAB
ANION GAP SERPL CALC-SCNC: 10 MMOL/L (ref 0–18)
ANTIBODY SCREEN: NEGATIVE
BASOPHILS # BLD AUTO: 0.07 X10(3) UL (ref 0–0.2)
BASOPHILS NFR BLD AUTO: 0.5 %
BUN BLD-MCNC: 6 MG/DL (ref 9–23)
CALCIUM BLD-MCNC: 8.8 MG/DL (ref 8.7–10.6)
CHLORIDE SERPL-SCNC: 106 MMOL/L (ref 98–112)
CO2 SERPL-SCNC: 23 MMOL/L (ref 21–32)
CREAT BLD-MCNC: 0.55 MG/DL
EGFRCR SERPLBLD CKD-EPI 2021: 123 ML/MIN/1.73M2 (ref 60–?)
EOSINOPHIL # BLD AUTO: 0.2 X10(3) UL (ref 0–0.7)
EOSINOPHIL NFR BLD AUTO: 1.6 %
ERYTHROCYTE [DISTWIDTH] IN BLOOD BY AUTOMATED COUNT: 15 %
GLUCOSE BLD-MCNC: 95 MG/DL (ref 70–99)
HCT VFR BLD AUTO: 33.2 %
HGB BLD-MCNC: 10.5 G/DL
IMM GRANULOCYTES # BLD AUTO: 0.09 X10(3) UL (ref 0–1)
IMM GRANULOCYTES NFR BLD: 0.7 %
LYMPHOCYTES # BLD AUTO: 2.33 X10(3) UL (ref 1–4)
LYMPHOCYTES NFR BLD AUTO: 18.3 %
MCH RBC QN AUTO: 27 PG (ref 26–34)
MCHC RBC AUTO-ENTMCNC: 31.6 G/DL (ref 31–37)
MCV RBC AUTO: 85.3 FL
MONOCYTES # BLD AUTO: 1.13 X10(3) UL (ref 0.1–1)
MONOCYTES NFR BLD AUTO: 8.9 %
NEUTROPHILS # BLD AUTO: 8.91 X10 (3) UL (ref 1.5–7.7)
NEUTROPHILS # BLD AUTO: 8.91 X10(3) UL (ref 1.5–7.7)
NEUTROPHILS NFR BLD AUTO: 70 %
OSMOLALITY SERPL CALC.SUM OF ELEC: 285 MOSM/KG (ref 275–295)
PLATELET # BLD AUTO: 222 10(3)UL (ref 150–450)
POTASSIUM SERPL-SCNC: 4.4 MMOL/L (ref 3.5–5.1)
RBC # BLD AUTO: 3.89 X10(6)UL
RH BLOOD TYPE: POSITIVE
SODIUM SERPL-SCNC: 139 MMOL/L (ref 136–145)
WBC # BLD AUTO: 12.7 X10(3) UL (ref 4–11)

## 2025-03-17 PROCEDURE — 3E0P7VZ INTRODUCTION OF HORMONE INTO FEMALE REPRODUCTIVE, VIA NATURAL OR ARTIFICIAL OPENING: ICD-10-PCS | Performed by: OBSTETRICS & GYNECOLOGY

## 2025-03-17 RX ORDER — SODIUM CHLORIDE 9 MG/ML
25 INJECTION, SOLUTION INTRAVENOUS CONTINUOUS PRN
Status: DISCONTINUED | OUTPATIENT
Start: 2025-03-17 | End: 2025-03-18

## 2025-03-17 RX ORDER — DIPHENHYDRAMINE HYDROCHLORIDE 50 MG/ML
25 INJECTION, SOLUTION INTRAMUSCULAR; INTRAVENOUS NIGHTLY PRN
Status: DISCONTINUED | OUTPATIENT
Start: 2025-03-17 | End: 2025-03-18

## 2025-03-17 RX ORDER — DEXTROSE, SODIUM CHLORIDE, SODIUM LACTATE, POTASSIUM CHLORIDE, AND CALCIUM CHLORIDE 5; .6; .31; .03; .02 G/100ML; G/100ML; G/100ML; G/100ML; G/100ML
50 INJECTION, SOLUTION INTRAVENOUS CONTINUOUS PRN
Status: DISCONTINUED | OUTPATIENT
Start: 2025-03-17 | End: 2025-03-18

## 2025-03-17 RX ORDER — ACETAMINOPHEN 500 MG
1000 TABLET ORAL EVERY 6 HOURS PRN
Status: DISCONTINUED | OUTPATIENT
Start: 2025-03-17 | End: 2025-03-18

## 2025-03-17 RX ORDER — IBUPROFEN 600 MG/1
600 TABLET, FILM COATED ORAL ONCE AS NEEDED
Status: DISCONTINUED | OUTPATIENT
Start: 2025-03-17 | End: 2025-03-18 | Stop reason: DRUGHIGH

## 2025-03-17 RX ORDER — DIPHENHYDRAMINE HCL 25 MG
25 CAPSULE ORAL NIGHTLY PRN
Status: DISCONTINUED | OUTPATIENT
Start: 2025-03-17 | End: 2025-03-18

## 2025-03-17 RX ORDER — METOCLOPRAMIDE HYDROCHLORIDE 5 MG/ML
10 INJECTION INTRAMUSCULAR; INTRAVENOUS EVERY 6 HOURS PRN
Status: DISCONTINUED | OUTPATIENT
Start: 2025-03-17 | End: 2025-03-18

## 2025-03-17 RX ORDER — SODIUM CHLORIDE, SODIUM LACTATE, POTASSIUM CHLORIDE, CALCIUM CHLORIDE 600; 310; 30; 20 MG/100ML; MG/100ML; MG/100ML; MG/100ML
INJECTION, SOLUTION INTRAVENOUS CONTINUOUS
Status: DISCONTINUED | OUTPATIENT
Start: 2025-03-17 | End: 2025-03-18

## 2025-03-17 RX ORDER — ROPIVACAINE HYDROCHLORIDE 5 MG/ML
30 INJECTION, SOLUTION EPIDURAL; INFILTRATION; PERINEURAL AS NEEDED
Status: DISCONTINUED | OUTPATIENT
Start: 2025-03-17 | End: 2025-03-18

## 2025-03-17 RX ORDER — NICOTINE POLACRILEX 4 MG
30 LOZENGE BUCCAL
Status: DISCONTINUED | OUTPATIENT
Start: 2025-03-17 | End: 2025-03-18

## 2025-03-17 RX ORDER — DEXTROSE, SODIUM CHLORIDE, SODIUM LACTATE, POTASSIUM CHLORIDE, AND CALCIUM CHLORIDE 5; .6; .31; .03; .02 G/100ML; G/100ML; G/100ML; G/100ML; G/100ML
INJECTION, SOLUTION INTRAVENOUS AS NEEDED
Status: DISCONTINUED | OUTPATIENT
Start: 2025-03-17 | End: 2025-03-18

## 2025-03-17 RX ORDER — SODIUM CHLORIDE, SODIUM LACTATE, POTASSIUM CHLORIDE, CALCIUM CHLORIDE 600; 310; 30; 20 MG/100ML; MG/100ML; MG/100ML; MG/100ML
INJECTION, SOLUTION INTRAVENOUS CONTINUOUS PRN
Status: DISCONTINUED | OUTPATIENT
Start: 2025-03-17 | End: 2025-03-18

## 2025-03-17 RX ORDER — CALCIUM CARBONATE 500 MG/1
1000 TABLET, CHEWABLE ORAL EVERY 4 HOURS PRN
Status: DISCONTINUED | OUTPATIENT
Start: 2025-03-17 | End: 2025-03-18

## 2025-03-17 RX ORDER — ONDANSETRON 2 MG/ML
4 INJECTION INTRAMUSCULAR; INTRAVENOUS EVERY 6 HOURS PRN
Status: DISCONTINUED | OUTPATIENT
Start: 2025-03-17 | End: 2025-03-18 | Stop reason: DRUGHIGH

## 2025-03-17 RX ORDER — TERBUTALINE SULFATE 1 MG/ML
0.25 INJECTION SUBCUTANEOUS AS NEEDED
Status: DISCONTINUED | OUTPATIENT
Start: 2025-03-17 | End: 2025-03-18

## 2025-03-17 RX ORDER — CITRIC ACID/SODIUM CITRATE 334-500MG
30 SOLUTION, ORAL ORAL AS NEEDED
Status: DISCONTINUED | OUTPATIENT
Start: 2025-03-17 | End: 2025-03-18

## 2025-03-17 RX ORDER — ACETAMINOPHEN 500 MG
500 TABLET ORAL EVERY 6 HOURS PRN
Status: DISCONTINUED | OUTPATIENT
Start: 2025-03-17 | End: 2025-03-18 | Stop reason: DRUGHIGH

## 2025-03-17 RX ORDER — DEXTROSE MONOHYDRATE 25 G/50ML
50 INJECTION, SOLUTION INTRAVENOUS
Status: DISCONTINUED | OUTPATIENT
Start: 2025-03-17 | End: 2025-03-18

## 2025-03-17 RX ORDER — POLYETHYLENE GLYCOL 3350 17 G/17G
17 POWDER, FOR SOLUTION ORAL DAILY
Status: DISCONTINUED | OUTPATIENT
Start: 2025-03-17 | End: 2025-03-18

## 2025-03-17 RX ORDER — NICOTINE POLACRILEX 4 MG
15 LOZENGE BUCCAL
Status: DISCONTINUED | OUTPATIENT
Start: 2025-03-17 | End: 2025-03-18

## 2025-03-17 RX ORDER — HYDROMORPHONE HYDROCHLORIDE 1 MG/ML
1 INJECTION, SOLUTION INTRAMUSCULAR; INTRAVENOUS; SUBCUTANEOUS EVERY 4 HOURS PRN
Status: DISCONTINUED | OUTPATIENT
Start: 2025-03-17 | End: 2025-03-18

## 2025-03-17 NOTE — PROGRESS NOTES
Derek 39w0d    She is doing well, no complaints     NST reactive       EDC by 8w0d US   - NIPT & Carrier discussed- declined  - msAFP: declined  - L2US nl   -failed 1 hour & 3 hr, see below   -tdap done 12/31/2024  -HIV, syphilis screen done  -GBS done     Obesity (pre preg BMI 33)   -nl L2US  - follow up growth w BPP at 32 weeks: EFW 51%ile BPP 8/8  -37w1d : efw 48% BPP 8/8  -A1c: 5.6 on 8/21, CMP normal   -32w2d ultrasound: 4 lbs 6 oz (51st percentile)  -limit wt gain 11-20 lbs   -NSTs weekly at 36wk     GDM on medication  - diagnosed 2/5 with failed 3hr GTT  - saw educator 2/14  - as of 36w1d reporting at least 50% of sugars are above goal.   -Is sending BS to MFM weekly,  -MFM US 3/4  with EFW 48%ile, vertex, normal fluid  -twice weekly NST  -delivery at 38-39+ wks   --3/10/25 -membrane sweep discussed, declines today.   3/13/25- metformin increased to 1000 mg before breakfast and 1000 mg before dinner      Fetal PAC vs PVCs  -skipped beats heard on NST 3/4, had just had BPP 8/8 with MFM, monitoring on L&D with reactive and reassuring NST, tracing well but still audible arrhythmia, discussed with MFM and did not recommend further intervention given normal tracing, no tachycardia  -as of 3/6 - reactive NST, no audible arrhythmia      AMA at time of delivery  - NST weekly 36 wks     H/o oligohydramnios   -IOL at 41 wks for low fluid   -she was scared of IOL, wanted to wait as long as possible.

## 2025-03-18 ENCOUNTER — ANESTHESIA (OUTPATIENT)
Dept: OBGYN UNIT | Facility: HOSPITAL | Age: 35
End: 2025-03-18
Payer: COMMERCIAL

## 2025-03-18 ENCOUNTER — ANESTHESIA EVENT (OUTPATIENT)
Dept: OBGYN UNIT | Facility: HOSPITAL | Age: 35
End: 2025-03-18
Payer: COMMERCIAL

## 2025-03-18 PROBLEM — O09.523 AMA (ADVANCED MATERNAL AGE) MULTIGRAVIDA 35+, THIRD TRIMESTER (HCC): Status: ACTIVE | Noted: 2025-03-18

## 2025-03-18 PROBLEM — O99.013 MATERNAL IRON DEFICIENCY ANEMIA AFFECTING PREGNANCY IN THIRD TRIMESTER, ANTEPARTUM (HCC): Status: ACTIVE | Noted: 2025-03-18

## 2025-03-18 PROBLEM — O24.415 GESTATIONAL DIABETES MELLITUS (GDM) IN THIRD TRIMESTER CONTROLLED ON ORAL HYPOGLYCEMIC DRUG (HCC): Status: ACTIVE | Noted: 2025-03-18

## 2025-03-18 PROBLEM — Z98.890 STATUS POST INDUCTION OF LABOR: Status: ACTIVE | Noted: 2025-03-18

## 2025-03-18 PROBLEM — O99.891 HISTORY OF POSTPARTUM DEPRESSION, CURRENTLY PREGNANT IN THIRD TRIMESTER (HCC): Status: ACTIVE | Noted: 2025-03-18

## 2025-03-18 PROBLEM — D50.9 MATERNAL IRON DEFICIENCY ANEMIA AFFECTING PREGNANCY IN THIRD TRIMESTER, ANTEPARTUM (HCC): Status: ACTIVE | Noted: 2025-03-18

## 2025-03-18 PROBLEM — Z86.59 HISTORY OF POSTPARTUM DEPRESSION, CURRENTLY PREGNANT IN THIRD TRIMESTER (HCC): Status: ACTIVE | Noted: 2025-03-18

## 2025-03-18 PROBLEM — O99.213 OBESITY AFFECTING PREGNANCY IN THIRD TRIMESTER (HCC): Status: ACTIVE | Noted: 2025-03-18

## 2025-03-18 PROBLEM — O48.0 POST-TERM PREGNANCY, 40-42 WEEKS OF GESTATION (HCC): Status: ACTIVE | Noted: 2025-03-18

## 2025-03-18 PROBLEM — O99.820 GBS (GROUP B STREPTOCOCCUS CARRIER), +RV CULTURE, CURRENTLY PREGNANT (HCC): Status: ACTIVE | Noted: 2025-03-18

## 2025-03-18 LAB
DEPRECATED HBV CORE AB SER IA-ACNC: 5 NG/ML
EST. AVERAGE GLUCOSE BLD GHB EST-MCNC: 114 MG/DL (ref 68–126)
GLUCOSE BLD-MCNC: 100 MG/DL (ref 70–99)
GLUCOSE BLD-MCNC: 108 MG/DL (ref 70–99)
GLUCOSE BLD-MCNC: 109 MG/DL (ref 70–99)
GLUCOSE BLD-MCNC: 111 MG/DL (ref 70–99)
GLUCOSE BLD-MCNC: 112 MG/DL (ref 70–99)
GLUCOSE BLD-MCNC: 117 MG/DL (ref 70–99)
GLUCOSE BLD-MCNC: 121 MG/DL (ref 70–99)
GLUCOSE BLD-MCNC: 123 MG/DL (ref 70–99)
GLUCOSE BLD-MCNC: 88 MG/DL (ref 70–99)
HBA1C MFR BLD: 5.6 % (ref ?–5.7)
T PALLIDUM AB SER QL IA: NONREACTIVE

## 2025-03-18 PROCEDURE — 3E033VJ INTRODUCTION OF OTHER HORMONE INTO PERIPHERAL VEIN, PERCUTANEOUS APPROACH: ICD-10-PCS | Performed by: OBSTETRICS & GYNECOLOGY

## 2025-03-18 PROCEDURE — 0KQM0ZZ REPAIR PERINEUM MUSCLE, OPEN APPROACH: ICD-10-PCS | Performed by: OBSTETRICS & GYNECOLOGY

## 2025-03-18 PROCEDURE — 10907ZC DRAINAGE OF AMNIOTIC FLUID, THERAPEUTIC FROM PRODUCTS OF CONCEPTION, VIA NATURAL OR ARTIFICIAL OPENING: ICD-10-PCS | Performed by: OBSTETRICS & GYNECOLOGY

## 2025-03-18 PROCEDURE — 59400 OBSTETRICAL CARE: CPT | Performed by: OBSTETRICS & GYNECOLOGY

## 2025-03-18 RX ORDER — NICOTINE POLACRILEX 4 MG
30 LOZENGE BUCCAL
Status: DISCONTINUED | OUTPATIENT
Start: 2025-03-18 | End: 2025-03-20

## 2025-03-18 RX ORDER — DEXTROSE MONOHYDRATE 25 G/50ML
50 INJECTION, SOLUTION INTRAVENOUS
Status: DISCONTINUED | OUTPATIENT
Start: 2025-03-18 | End: 2025-03-20

## 2025-03-18 RX ORDER — ACETAMINOPHEN 500 MG
500 TABLET ORAL EVERY 6 HOURS PRN
Status: DISCONTINUED | OUTPATIENT
Start: 2025-03-18 | End: 2025-03-20

## 2025-03-18 RX ORDER — METHYLERGONOVINE MALEATE 0.2 MG/ML
0.2 INJECTION INTRAVENOUS ONCE
Status: COMPLETED | OUTPATIENT
Start: 2025-03-18 | End: 2025-03-18

## 2025-03-18 RX ORDER — BUPIVACAINE HCL/0.9 % NACL/PF 0.25 %
PLASTIC BAG, INJECTION (ML) EPIDURAL
Status: DISPENSED
Start: 2025-03-18 | End: 2025-03-18

## 2025-03-18 RX ORDER — LIDOCAINE HYDROCHLORIDE 10 MG/ML
INJECTION, SOLUTION EPIDURAL; INFILTRATION; INTRACAUDAL; PERINEURAL AS NEEDED
Status: DISCONTINUED | OUTPATIENT
Start: 2025-03-18 | End: 2025-03-18 | Stop reason: SURG

## 2025-03-18 RX ORDER — ACETAMINOPHEN 500 MG
1000 TABLET ORAL EVERY 6 HOURS PRN
Status: DISCONTINUED | OUTPATIENT
Start: 2025-03-18 | End: 2025-03-20

## 2025-03-18 RX ORDER — LIDOCAINE HYDROCHLORIDE AND EPINEPHRINE 15; 5 MG/ML; UG/ML
INJECTION, SOLUTION EPIDURAL AS NEEDED
Status: DISCONTINUED | OUTPATIENT
Start: 2025-03-18 | End: 2025-03-18 | Stop reason: SURG

## 2025-03-18 RX ORDER — METHYLERGONOVINE MALEATE 0.2 MG/ML
INJECTION INTRAVENOUS
Status: COMPLETED
Start: 2025-03-18 | End: 2025-03-18

## 2025-03-18 RX ORDER — BISACODYL 10 MG
10 SUPPOSITORY, RECTAL RECTAL ONCE AS NEEDED
Status: COMPLETED | OUTPATIENT
Start: 2025-03-18 | End: 2025-03-19

## 2025-03-18 RX ORDER — BUPIVACAINE HYDROCHLORIDE 2.5 MG/ML
30 INJECTION, SOLUTION EPIDURAL; INFILTRATION; INTRACAUDAL; PERINEURAL AS NEEDED
Status: COMPLETED | OUTPATIENT
Start: 2025-03-18 | End: 2025-03-18

## 2025-03-18 RX ORDER — SIMETHICONE 80 MG
80 TABLET,CHEWABLE ORAL 3 TIMES DAILY PRN
Status: DISCONTINUED | OUTPATIENT
Start: 2025-03-18 | End: 2025-03-20

## 2025-03-18 RX ORDER — AMMONIA 15 % (W/V)
0.3 AMPUL (EA) INHALATION AS NEEDED
Status: DISCONTINUED | OUTPATIENT
Start: 2025-03-18 | End: 2025-03-20

## 2025-03-18 RX ORDER — TRANEXAMIC ACID 10 MG/ML
1000 INJECTION, SOLUTION INTRAVENOUS ONCE
Status: COMPLETED | OUTPATIENT
Start: 2025-03-18 | End: 2025-03-18

## 2025-03-18 RX ORDER — LIDOCAINE HYDROCHLORIDE 20 MG/ML
5 INJECTION, SOLUTION EPIDURAL; INFILTRATION; INTRACAUDAL; PERINEURAL AS NEEDED
Status: DISCONTINUED | OUTPATIENT
Start: 2025-03-18 | End: 2025-03-18

## 2025-03-18 RX ORDER — MISOPROSTOL 200 UG/1
TABLET ORAL
Status: COMPLETED
Start: 2025-03-18 | End: 2025-03-18

## 2025-03-18 RX ORDER — IBUPROFEN 600 MG/1
600 TABLET, FILM COATED ORAL EVERY 6 HOURS
Status: DISCONTINUED | OUTPATIENT
Start: 2025-03-18 | End: 2025-03-20

## 2025-03-18 RX ORDER — ONDANSETRON 2 MG/ML
4 INJECTION INTRAMUSCULAR; INTRAVENOUS EVERY 6 HOURS PRN
Status: DISCONTINUED | OUTPATIENT
Start: 2025-03-18 | End: 2025-03-20

## 2025-03-18 RX ORDER — SODIUM CHLORIDE 9 MG/ML
10 INJECTION, SOLUTION INTRAMUSCULAR; INTRAVENOUS; SUBCUTANEOUS AS NEEDED
Status: COMPLETED | OUTPATIENT
Start: 2025-03-18 | End: 2025-03-18

## 2025-03-18 RX ORDER — BUPIVACAINE HCL/0.9 % NACL/PF 0.25 %
5 PLASTIC BAG, INJECTION (ML) EPIDURAL AS NEEDED
Status: DISCONTINUED | OUTPATIENT
Start: 2025-03-18 | End: 2025-03-18

## 2025-03-18 RX ORDER — NICOTINE POLACRILEX 4 MG
15 LOZENGE BUCCAL
Status: DISCONTINUED | OUTPATIENT
Start: 2025-03-18 | End: 2025-03-20

## 2025-03-18 RX ORDER — TRANEXAMIC ACID 10 MG/ML
INJECTION, SOLUTION INTRAVENOUS
Status: COMPLETED
Start: 2025-03-18 | End: 2025-03-18

## 2025-03-18 RX ORDER — ENOXAPARIN SODIUM 100 MG/ML
40 INJECTION SUBCUTANEOUS DAILY
Status: DISCONTINUED | OUTPATIENT
Start: 2025-03-18 | End: 2025-03-20

## 2025-03-18 RX ORDER — NALBUPHINE HYDROCHLORIDE 10 MG/ML
2.5 INJECTION INTRAMUSCULAR; INTRAVENOUS; SUBCUTANEOUS
Status: DISCONTINUED | OUTPATIENT
Start: 2025-03-18 | End: 2025-03-18

## 2025-03-18 RX ORDER — LIDOCAINE HYDROCHLORIDE AND EPINEPHRINE 15; 5 MG/ML; UG/ML
5 INJECTION, SOLUTION EPIDURAL AS NEEDED
Status: DISCONTINUED | OUTPATIENT
Start: 2025-03-18 | End: 2025-03-18

## 2025-03-18 RX ORDER — DOCUSATE SODIUM 100 MG/1
100 CAPSULE, LIQUID FILLED ORAL
Status: DISCONTINUED | OUTPATIENT
Start: 2025-03-18 | End: 2025-03-20

## 2025-03-18 RX ORDER — SODIUM CHLORIDE 9 MG/ML
INJECTION, SOLUTION INTRAMUSCULAR; INTRAVENOUS; SUBCUTANEOUS
Status: DISCONTINUED
Start: 2025-03-18 | End: 2025-03-18 | Stop reason: WASHOUT

## 2025-03-18 RX ADMIN — LIDOCAINE HYDROCHLORIDE AND EPINEPHRINE 3 ML: 15; 5 INJECTION, SOLUTION EPIDURAL at 05:52:00

## 2025-03-18 RX ADMIN — LIDOCAINE HYDROCHLORIDE 3 ML: 10 INJECTION, SOLUTION EPIDURAL; INFILTRATION; INTRACAUDAL; PERINEURAL at 05:48:00

## 2025-03-18 NOTE — H&P
[facility]   part of [facility]    History & Physical    Regina Garcias Patient Status:  Inpatient    1990 MRN [id number]   Location [facility] LABOR & DELIVERY Attending Vanessa Rivera MD   Hosp Day # 1 PCP Katie Burkett MD     The  Cures Act makes medical notes like these available to patients in the interest of transparency. Please be advised this is a medical document. Medical documents are intended to carry relevant information, facts as evident, and the clinical opinion of the practitioner. The medical note is intended as peer to peer communication and may appear blunt or direct. It is written in medical language and may contain abbreviations or verbiage that are unfamiliar.     Date of Admission: 3/17/2025  9:15 PM       HPI:   Regina Garcias is a 35 year old  at 39w1d admitted for IOL for GDM on metformin, AMA, obesity (pre preg BMI 33)    Pregnancy also significant for anxiety, h/o postpartum depression, anemia, h/o oligohydramnios, GBS+.     Has been taking metformin 1000 mg BID    +FM. No contractions, leaking fluid, vaginal bleeding on admission.     Patient Care Team:  Katie Burkett MD as PCP - General (Family Medicine)  Kenia Valle RD (Dietitian)     History   Obstetric History:   OB History    Para Term  AB Living   2 1 1     1   SAB IAB Ectopic Multiple Live Births         0 1      # Outcome Date GA Lbr [place]/2nd Weight Sex Type Anes PTL Lv   2 Current            1 Term 18 41w0d 07:22 / 02:25 7 lb 13.9 oz (3.57 kg) M NORMAL SPONT EPI N ISABELL      Complications: Meconium, Variable decelerations, Fetal tachycardia, Oligohydramnios      Obstetric Comments   2018 - oligohydramnios      Current - GDM on metformin, AMA, obesity (pre preg BMI 33), anemia, h/o oligohydramnios, GBS+.      Past Medical History:   Past Medical History:   Diagnosis Date    Anemia     Anxiety     Gestational diabetes (HCC) 2025    metformin 1000 mg BID     Hirsutism 01/29/2020    Obesity (BMI 30-39.9) 2024    Oligohydramnios (HCC) 2018    Post partum depression 2018    Vitamin D deficiency 03/17/2017      Past Social History:   Past Surgical History:   Procedure Laterality Date    [place] teeth removed      In 2015     Family History:   Family History   Problem Relation Age of Onset    Hypertension Mother     Breast Cancer Sister 45    Thyroid disease Neg     Diabetes Neg      Social History:   Social History     Tobacco Use    Smoking status: Never    Smokeless tobacco: Never   Substance Use Topics    Alcohol use: No     Comment: 1-2 beers a week      Immunization History:   Immunization History   Administered Date(s) Administered    Covid-19 Vaccine Pfizer 30 mcg/0.3 ml 01/15/2022    Covid-19 Vaccine Pfizer Gigi-Sucrose 30 mcg/0.3 ml 04/04/2022    DTP 04/13/2011    HEP B 03/30/2010, 05/26/2010, 08/24/2010    IPV 03/30/2010, 05/26/2010    Influenza 11/18/2018, 09/20/2020    MMR 09/10/2008, 10/21/2008    Meningococcal (Menomune) 10/21/2008    TDAP 09/20/2008, 12/31/2024    Varicella 09/10/2008, 10/21/2008         Allergies/Medications:   Allergies:   Allergies[1]    Medications:  Prescriptions Prior to Admission[2]  Prior to admission med list:   Medications Prior to Admission   Medication Sig    metFORMIN 500 MG Oral Tab Take 2 tablets (1,000 mg total) by mouth 2 (two) times daily with meals. Take before breakfast & dinner    hydrocortisone-pramoxine perianal 1-1 % External Foam Place 1 applicator rectally 2 (two) times daily.    Omega-3 Fatty Acids (FISH OIL OMEGA-3 OR) Take by mouth.    Prenatal MV-Min-Fe Fum-FA-DHA (PRENATAL/FOLIC ACID+DHA) 27-0.8-200 MG Oral Cap Take 1 tablet by mouth daily.    Blood Glucose Monitoring Suppl (ONETOUCH VERIO FLEX SYSTEM) w/Device Does not apply Kit Use as directed 4x per day.    OneTouch Delica Lancets 33G Does not apply Misc Check 4 times per day for gestational diabetes    Glucose Blood (ONETOUCH VERIO) In Vitro Strip Check 4  times per day for gestational diabetes    [] glycerin 2 g Rectal Suppos Place 1 suppository rectally once for 1 dose.     Inpatient Current Medication List:  Current Facility-Administered Medications   Medication Dose Route Frequency    insulin regular human (Novolin R, Humulin R) 100 Units in sodium chloride 0.9% 100 mL standard infusion (100 mL)  0.5-5.5 Units/hr Intravenous Continuous    lactated ringers IV bolus 1,000 mL  1,000 mL Intravenous Once    fentaNYL-bupivacaine 2 mcg/mL-0.125% in sodium chloride 0.9% 100 mL EPIDURAL infusion premix  12 mL/hr Epidural Continuous    lidocaine 1.5%-EPINEPHrine 1:200,000 (Xylocaine-Epinephrine) injection  5 mL Injection PRN    bupivacaine PF (Marcaine) 0.25% injection  30 mL Injection PRN    lidocaine PF (Xylocaine-MPF) 2% injection  5 mL Injection PRN    sodium chloride 0.9% PF injection 10 mL  10 mL Injection PRN    ePHEDrine (PF) 25 MG/5 ML injection 5 mg  5 mg Intravenous PRN    nalbuphine (Nubain) 10 mg/mL injection 2.5 mg  2.5 mg Intravenous Q15 Min PRN    sodium chloride 0.9% PF 0.9% injection        lactated ringers infusion   Intravenous Continuous    dextrose in lactated ringers 5% infusion   Intravenous PRN    lactated ringers IV bolus 500 mL  500 mL Intravenous PRN    acetaminophen (Tylenol Extra Strength) tab 500 mg  500 mg Oral Q6H PRN    acetaminophen (Tylenol Extra Strength) tab 1,000 mg  1,000 mg Oral Q6H PRN    ibuprofen (Motrin) tab 600 mg  600 mg Oral Once PRN    ondansetron (Zofran) 4 MG/2ML injection 4 mg  4 mg Intravenous Q6H PRN    oxyTOCIN in sodium chloride 0.9% (Pitocin) 30 Units/500mL infusion premix  62.5-900 darrell-units/min Intravenous Continuous    terbutaline (Brethine) 1 MG/ML injection 0.25 mg  0.25 mg Subcutaneous PRN    sodium citrate-citric acid (Bicitra) 500-334 MG/5ML oral solution 30 mL  30 mL Oral PRN    ropivacaine (Naropin) 0.5% injection  30 mL Injection PRN    diphenhydrAMINE (Benadryl) 50 mg/mL  injection 25 mg  25 mg  Intravenous Nightly PRN    metoclopramide (Reglan) 5 mg/mL injection 10 mg  10 mg Intravenous Q6H PRN    calcium carbonate (Tums) chewable tab 1,000 mg  1,000 mg Oral Q4H PRN    oxyTOCIN in sodium chloride 0.9% (Pitocin) 30 Units/500mL infusion premix  0.5-20 darrell-units/min Intravenous Continuous    dextrose in lactated ringers 5% infusion  50 mL/hr Intravenous Continuous PRN    lactated ringers infusion   mL/hr Intravenous Continuous PRN    sodium chloride 0.9% infusion  25 mL/hr Intravenous Continuous PRN    glucose (Dex4) 15 GM/59ML oral liquid 15 g  15 g Oral Q15 Min PRN    Or    glucose (Glutose) 40% oral gel 15 g  15 g Oral Q15 Min PRN    Or    glucose-vitamin C (Dex-4) chewable tab 4 tablet  4 tablet Oral Q15 Min PRN    Or    dextrose 50% injection 50 mL  50 mL Intravenous Q15 Min PRN    Or    glucose (Dex4) 15 GM/59ML oral liquid 30 g  30 g Oral Q15 Min PRN    Or    glucose (Glutose) 40% oral gel 30 g  30 g Oral Q15 Min PRN    Or    glucose-vitamin C (Dex-4) chewable tab 8 tablet  8 tablet Oral Q15 Min PRN    HYDROmorphone (Dilaudid) 1 MG/ML injection 1 mg  1 mg Intravenous Q4H PRN    polyethylene glycol (PEG 3350) (Miralax) 17 g oral packet 17 g  17 g Oral Daily    diphenhydrAMINE (Benadryl) cap/tab 25 mg  25 mg Oral Nightly PRN    ampicillin (Omnipen) 1 g in sodium chloride 0.9% 100 mL IVPB-MBP  1 g Intravenous Q4H     Facility-Administered Medications Ordered in Other Encounters   Medication Dose Route Frequency    lidocaine PF (Xylocaine-MPF) 1% injection   Infiltration PRN    lidocaine 1.5%-EPINEPHrine 1:200,000 (Xylocaine-Epinephrine) injection   Epidural PRN     Scheduled Meds:    lactated ringers  1,000 mL Intravenous Once    sodium chloride 0.9% PF        polyethylene glycol (PEG 3350)  17 g Oral Daily    ampicillin  1 g Intravenous Q4H     Continuous Infusions:    insulin regular 0.5 Units/hr (03/18/25 6597)    fentaNYL-bupivacaine in sodium chloride 0.9% 12 mL/hr (03/18/25 1391)    lactated  ringers Stopped (03/18/25 0325)    oxyTOCIN in sodium chloride 0.9%      oxyTOCIN in sodium chloride 0.9% 8 darrell-units/min (03/18/25 0830)    dextrose in lactated ringers 50 mL/hr (03/18/25 0554)    lactated ringers 50 mL/hr (03/18/25 0630)    sodium chloride 25 mL/hr (03/18/25 0325)     PRN Medications:     lidocaine-EPINEPHrine    bupivacaine PF    lidocaine PF    sodium chloride 0.9% PF    ePHEDrine (PF)    nalbuphine    sodium chloride 0.9% PF    dextrose in lactated ringers    lactated ringers    acetaminophen    acetaminophen    ibuprofen    ondansetron    terbutaline    sodium citrate-citric acid    ropivacaine    diphenhydrAMINE    metoclopramide    calcium carbonate    dextrose in lactated ringers    lactated ringers    sodium chloride    glucose **OR** glucose **OR** glucose-vitamin C **OR** dextrose **OR** glucose **OR** glucose **OR** glucose-vitamin C    HYDROmorphone    diphenhydrAMINE    Review of Systems:   As documented in HPI    Physical Exam:   Temp:  [97.6 °F (36.4 °C)-98.1 °F (36.7 °C)] 98.1 °F (36.7 °C)  Pulse:  [] 80  Resp:  [15-18] 18  BP: ()/(55-79) 96/55  SpO2:  [98 %-99 %] 98 %    Vitals:    03/18/25 0730 03/18/25 0745 03/18/25 0800 03/18/25 0815   BP:  92/57 92/60 96/55   BP Location:       Pulse: 80 80 64 80   Resp:       Temp: 98.1 °F (36.7 °C)      TempSrc: Oral      SpO2: 98%      Weight:       Height:           Estimated body mass index is 33.65 kg/m² as calculated from the following:    Height as of this encounter: 58\".    Weight as of this encounter: 161 lb (73 kg).     FHT: 130 bpm, mod tor  [place] 2 min apart on IV oxytocin at 6 milliunits/min  SVE 4/70/-1 per RN exam at 0621   Cervical Exam Data    Cervical Exam Data  Exam Time Dilation Station   0621 4 -1   03/17/25 2224 1 -2       Results:     Component      Latest Ref Rn 3/17/2025  9:38 PM   WBC      4.0 - 11.0 x10(3) uL 12.7 (H)    RBC      3.80 - 5.30 x10(6)uL 3.89    Hemoglobin      12.0 - 16.0 g/dL 10.5 (L)     Hematocrit      35.0 - 48.0 % 33.2 (L)    Platelet Count      150.0 - 450.0 10(3)uL 222.0    MCV      80.0 - 100.0 fL 85.3    MCH      26.0 - 34.0 pg 27.0    MCHC      31.0 - 37.0 g/dL 31.6    RDW      % 15.0    Prelim Neutrophil Abs      1.50 - 7.70 x10 (3) uL 8.91 (H)    Neutrophils Absolute      1.50 - 7.70 x10(3) uL 8.91 (H)    Lymphocytes Absolute      1.00 - 4.00 x10(3) uL 2.33    Monocytes Absolute      0.10 - 1.00 x10(3) uL 1.13 (H)    Eosinophils Absolute      0.00 - 0.70 x10(3) uL 0.20    Basophils Absolute      0.00 - 0.20 x10(3) uL 0.07    Immature Granulocyte Absolute      0.00 - 1.00 x10(3) uL 0.09    Neutrophils %      % 70.0    Lymphocytes %      % 18.3    Monocytes %      % 8.9    Eosinophils %      % 1.6    Basophils %      % 0.5    Immature Granulocyte %      % 0.7    Glucose      70 - 99 mg/dL 95    Sodium      136 - 145 mmol/L 139    Potassium      3.5 - 5.1 mmol/L 4.4    Chloride      98 - 112 mmol/L 106    Carbon Dioxide, Total      21.0 - 32.0 mmol/L 23.0    ANION GAP      0 - 18 mmol/L 10    BUN      9 - 23 mg/dL 6 (L)    CREATININE      0.55 - 1.02 mg/dL 0.55    CALCIUM      8.7 - 10.6 mg/dL 8.8    CALCULATED OSMOLALITY      275 - 295 mOsm/kg 285    EGFR      >=60 mL/min/1.73m2 123    ABO BLOOD TYPE O    RH Factor Positive    TREPONEMA PALLIDUM AB, PARTICLE AGGLUTINATION      Nonreactive   Nonreactive    ANTIBODY SCREEN Negative       Component      Latest Ref Rng 3/18/2025  3:01 AM 3/18/2025  4:27 AM 3/18/2025  5:29 AM 3/18/2025  6:27 AM 3/18/2025  7:28 AM   POC GLUCOSE      70 - 99 mg/dL 117 (H)  123 (H)  112 (H)  121 (H)  108 (H)       Assessment/Plan:    Yovanielliott Hall Ameni 35 year old  at 39w1d -  admitted for IOL for GDM on metformin, AMA, obesity (pre preg BMI 33)    Pregnancy also significant for anxiety, h/o postpartum depression, anemia, h/o oligohydramnios, GBS+.     +FWB    IOL  -received misoprostol x 1 dose at 2300 on 3/17  -IV oxytocin started 0330 on  3/18  -amniotomy planned     GDM on metformin  -insulin gtt  -A1c ordered     Iron deficiency anemia  -ferritin 5 on admission   -may need IV iron after delivery     Epidural in place  O+/GBS+ ampicillin       Eliana Laura MD         [1] No Known Allergies  [2]   Medications Prior to Admission   Medication Sig Dispense Refill Last Dose/Taking    metFORMIN 500 MG Oral Tab Take 2 tablets (1,000 mg total) by mouth 2 (two) times daily with meals. Take before breakfast & dinner 60 tablet 0 3/17/2025 Evening    hydrocortisone-pramoxine perianal 1-1 % External Foam Place 1 applicator rectally 2 (two) times daily. 10 g 0 Past Month    Omega-3 Fatty Acids (FISH OIL OMEGA-3 OR) Take by mouth.   Past Week    Prenatal MV-Min-Fe Fum-FA-DHA (PRENATAL/FOLIC ACID+DHA) 27-0.8-200 MG Oral Cap Take 1 tablet by mouth daily. 30 capsule 0 Past Week    Blood Glucose Monitoring Suppl (ONETOUCH VERIO FLEX SYSTEM) w/Device Does not apply Kit Use as directed 4x per day. 1 kit 0     OneTouch Delica Lancets 33G Does not apply Misc Check 4 times per day for gestational diabetes 100 each 3     Glucose Blood (ONETOUCH VERIO) In Vitro Strip Check 4 times per day for gestational diabetes 100 strip 3     [] glycerin 2 g Rectal Suppos Place 1 suppository rectally once for 1 dose. 1 suppository 0

## 2025-03-18 NOTE — PROGRESS NOTES
Patient up to bathroom with assist x 2.  Voided 650 mL. Patient transferred to mother/baby room 2217 per wheelchair in stable condition with baby and personal belongings.  Accompanied by family member and staff.  Report given to mother/baby RN at [facility] nurses station. Baby bands read and verified with two RNS, Fundal massage done at bedside with receiving RN, Fundus midline, firm and one below U.

## 2025-03-18 NOTE — PROGRESS NOTES
Pt admit to room from L/D. Report received. Mother and baby together in room. Teaching Materials given and discussed plan of care.

## 2025-03-18 NOTE — ANESTHESIA PREPROCEDURE EVALUATION
PRE-OP EVALUATION    Patient Name: Regina Garcias    Admit Diagnosis: Pregnancy (HCC) [Z34.90]    Pre-op Diagnosis: * No surgery found *        Anesthesia Procedure: LABOR ANALGESIA    * Surgery not found *    Pre-op vitals reviewed.  Temp: 98.1 °F (36.7 °C)  Pulse: 92  Resp: 15  BP: 106/62  SpO2: 99 %  Body mass index is 33.65 kg/m².    Current medications reviewed.  Hospital Medications:   insulin regular human (Novolin R, Humulin R) 100 Units in sodium chloride 0.9% 100 mL standard infusion (100 mL)  0.5-5.5 Units/hr Intravenous Continuous    lactated ringers IV bolus 1,000 mL  1,000 mL Intravenous Once    fentaNYL-bupivacaine 2 mcg/mL-0.125% in sodium chloride 0.9% 100 mL EPIDURAL infusion premix  12 mL/hr Epidural Continuous    fentaNYL (Sublimaze) 50 mcg/mL injection 100 mcg  100 mcg Epidural Once    lidocaine 1.5%-EPINEPHrine 1:200,000 (Xylocaine-Epinephrine) injection  5 mL Injection PRN    bupivacaine PF (Marcaine) 0.25% injection  30 mL Injection PRN    lidocaine PF (Xylocaine-MPF) 2% injection  5 mL Injection PRN    sodium chloride 0.9% PF injection 10 mL  10 mL Injection PRN    ePHEDrine (PF) 25 MG/5 ML injection 5 mg  5 mg Intravenous PRN    nalbuphine (Nubain) 10 mg/mL injection 2.5 mg  2.5 mg Intravenous Q15 Min PRN    sodium chloride 0.9% PF 0.9% injection        ePHEDrine (PF) 25 MG/5 ML injection        lactated ringers infusion   Intravenous Continuous    dextrose in lactated ringers 5% infusion   Intravenous PRN    lactated ringers IV bolus 500 mL  500 mL Intravenous PRN    acetaminophen (Tylenol Extra Strength) tab 500 mg  500 mg Oral Q6H PRN    acetaminophen (Tylenol Extra Strength) tab 1,000 mg  1,000 mg Oral Q6H PRN    ibuprofen (Motrin) tab 600 mg  600 mg Oral Once PRN    ondansetron (Zofran) 4 MG/2ML injection 4 mg  4 mg Intravenous Q6H PRN    oxyTOCIN in sodium chloride 0.9% (Pitocin) 30 Units/500mL infusion premix  62.5-900 darrell-units/min Intravenous Continuous    terbutaline  (Brethine) 1 MG/ML injection 0.25 mg  0.25 mg Subcutaneous PRN    sodium citrate-citric acid (Bicitra) 500-334 MG/5ML oral solution 30 mL  30 mL Oral PRN    ropivacaine (Naropin) 0.5% injection  30 mL Injection PRN    diphenhydrAMINE (Benadryl) 50 mg/mL  injection 25 mg  25 mg Intravenous Nightly PRN    metoclopramide (Reglan) 5 mg/mL injection 10 mg  10 mg Intravenous Q6H PRN    calcium carbonate (Tums) chewable tab 1,000 mg  1,000 mg Oral Q4H PRN    misoprostol (CYTOTEC) partial tablets 25 mcg  25 mcg Vaginal 6 times per day    oxyTOCIN in sodium chloride 0.9% (Pitocin) 30 Units/500mL infusion premix  0.5-20 darrell-units/min Intravenous Continuous    dextrose in lactated ringers 5% infusion  50 mL/hr Intravenous Continuous PRN    lactated ringers infusion   mL/hr Intravenous Continuous PRN    sodium chloride 0.9% infusion  25 mL/hr Intravenous Continuous PRN    glucose (Dex4) 15 GM/59ML oral liquid 15 g  15 g Oral Q15 Min PRN    Or    glucose (Glutose) 40% oral gel 15 g  15 g Oral Q15 Min PRN    Or    glucose-vitamin C (Dex-4) chewable tab 4 tablet  4 tablet Oral Q15 Min PRN    Or    dextrose 50% injection 50 mL  50 mL Intravenous Q15 Min PRN    Or    glucose (Dex4) 15 GM/59ML oral liquid 30 g  30 g Oral Q15 Min PRN    Or    glucose (Glutose) 40% oral gel 30 g  30 g Oral Q15 Min PRN    Or    glucose-vitamin C (Dex-4) chewable tab 8 tablet  8 tablet Oral Q15 Min PRN    HYDROmorphone (Dilaudid) 1 MG/ML injection 1 mg  1 mg Intravenous Q4H PRN    polyethylene glycol (PEG 3350) (Miralax) 17 g oral packet 17 g  17 g Oral Daily    diphenhydrAMINE (Benadryl) cap/tab 25 mg  25 mg Oral Nightly PRN    [COMPLETED] ampicillin (Omnipen) 2 g in sodium chloride 0.9% 100 mL IVPB-MBP  2 g Intravenous Once    Followed by    ampicillin (Omnipen) 1 g in sodium chloride 0.9% 100 mL IVPB-MBP  1 g Intravenous Q4H       Outpatient Medications:   Prescriptions Prior to Admission[1]    Allergies: Patient has no known  allergies.      Anesthesia Evaluation    Patient summary reviewed.    Anesthetic Complications  (-) history of anesthetic complications         GI/Hepatic/Renal    Negative GI/hepatic/renal ROS.                             Cardiovascular    Negative cardiovascular ROS.    Exercise tolerance: good     MET: >4                                           Endo/Other      (+) diabetes  gestational,                          Pulmonary    Negative pulmonary ROS.                       Neuro/Psych    Negative neuro/psych ROS.                                  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  Lab Results   Component Value Date    WBC 12.7 (H) 03/17/2025    RBC 3.89 03/17/2025    HGB 10.5 (L) 03/17/2025    HCT 33.2 (L) 03/17/2025    MCV 85.3 03/17/2025    MCH 27.0 03/17/2025    MCHC 31.6 03/17/2025    RDW 15.0 03/17/2025    .0 03/17/2025     Lab Results   Component Value Date     03/17/2025    K 4.4 03/17/2025     03/17/2025    CO2 23.0 03/17/2025    BUN 6 (L) 03/17/2025    CREATSERUM 0.55 03/17/2025    GLU 95 03/17/2025    CA 8.8 03/17/2025            Airway      Mallampati: II  Mouth opening: >3 FB  TM distance: > 6 cm  Neck ROM: full Cardiovascular    Cardiovascular exam normal.  Rhythm: regular  Rate:  normal     Dental    Dentition appears grossly intact         Pulmonary    Pulmonary exam normal.  Breath sounds clear to auscultation bilaterally.               Other findings              ASA: 2   Plan: epidural     Patient has not taken beta blockers in last 24 hours.      Comment: The risks of neuraxial anesthesia, which include bleeding, infection, headache, nerve injury, and failed attempts, were discussed with the patient and her partner. The patient understands the risks, benefits, and alternatives, and agrees to proceed with an epidural for labor analgesia.  Plan/risks discussed with: patient                Present on Admission:  **None**             [1]   Medications Prior to Admission   Medication Sig Dispense Refill Last Dose/Taking    metFORMIN 500 MG Oral Tab Take 2 tablets (1,000 mg total) by mouth 2 (two) times daily with meals. Take before breakfast & dinner 60 tablet 0 3/17/2025 Evening    hydrocortisone-pramoxine perianal 1-1 % External Foam Place 1 applicator rectally 2 (two) times daily. 10 g 0 Past Month    Omega-3 Fatty Acids (FISH OIL OMEGA-3 OR) Take by mouth.   Past Week    Prenatal MV-Min-Fe Fum-FA-DHA (PRENATAL/FOLIC ACID+DHA) 27-0.8-200 MG Oral Cap Take 1 tablet by mouth daily. 30 capsule 0 Past Week    Blood Glucose Monitoring Suppl (ONETOUCH VERIO FLEX SYSTEM) w/Device Does not apply Kit Use as directed 4x per day. 1 kit 0     OneTouch Delica Lancets 33G Does not apply Misc Check 4 times per day for gestational diabetes 100 each 3     Glucose Blood (ONETOUCH VERIO) In Vitro Strip Check 4 times per day for gestational diabetes 100 strip 3     [] glycerin 2 g Rectal Suppos Place 1 suppository rectally once for 1 dose. 1 suppository 0

## 2025-03-18 NOTE — L&D DELIVERY NOTE
Ameni, Girl [[id number]]      Labor Events     labor?: No   steroids?: None  Antibiotics received during labor?: Yes  Antibiotics (enter # doses in comment): ampicillin  Rupture date/time: 3/18/2025 1036     Rupture type: AROM  Fluid color: Clear  Labor type: Induced Onset of Labor  Induction: Misoprostol, Oxytocin, AROM  Cervical ripening date/time: 3/17/2025  Indications for induction: IDDM/GDDM  Induction comment: GDM on metformin, AMA, obesity   Intrapartum & labor complications: Group B beta strep +, Other - see comments  Intrapartum & labor complications comment: GDM on metformin, AMA, obesity, GBS carrier        Labor Event Times    Labor onset date/time: 3/18/2025 0621  Dilation complete date/time: 3/18/2025 1202  Start pushing date/time: 3/18/2025 1258        Presentation    Presentation: Vertex  Position: Left Occiput Anterior       Operative Delivery    Operative Vaginal Delivery: N/A                Shoulder Dystocia    Shoulder Dystocia: N/A       Anesthesia    Method: Epidural              Mio Delivery      Head delivery date/time: 3/18/2025 13:12:01   Delivery date/time:  3/18/25 13:12:17   Delivery type: Normal spontaneous vaginal delivery    Details:     Delivery location: delivery room       Delivery Providers    Delivering Clinician: Eliana Laura MD   Delivery personnel:  Provider Role   Zoraida Nichole, NUNO Baby Nurse   Robert Crump RN Delivery Nurse             Cord    Vessels: 3 Vessels  Complications: Nuchal  # of loops: 1  Timed cord clamping: Yes  Time in sec: 60  Cord blood disposition: cord blood bank, to lab  Gases sent?: No       Resuscitation    Method: None       Mio Measurements      Weight: 3520 g 7 lb 12.2 oz Length: 48.3 cm     Head circum.: 35 cm Chest circum.: 33 cm      Abdominal circum.: 30 cm           Placenta    Date/time: 3/18/2025 1316  Removal: Spontaneous  Appearance: Intact  Disposition: Pathology       Apgars    Living status:  Living   Apgar Scoring Key:    0 1 2    Skin color Blue or pale Acrocyanotic Completely pink    Heart rate Absent <100 bpm >100 bpm    Reflex irritability No response Grimace Cry or active withdrawal    Muscle tone Limp Some flexion Active motion    Respiratory effort Absent Weak cry; hypoventilation Good, crying              1 Minute:  5 Minute:  10 Minute:  15 Minute:  20 Minute:      Skin color: 0  1       Heart rate: 2  2       Reflex irritablity: 2  2       Muscle tone: 2  2       Respiratory effort: 2  2       Total: 8  9          Apgars assigned by: LUCILA KEATING RN   disposition: with mother       Skin to Skin    Skin to skin initiated date/time: 3/18/2025 1348  Skin to skin with: Mother       Vaginal Count    Initial count RN: Robert Crump RN  Initial count Tech: Gifty Rodgers    Initial counts 11   0    Final counts 11   1    Final count RN: Robert Crump RN  Final count MD: Eliana Laura MD       Lacerations    Episiotomy: None  Perineal lacerations: 2nd Repaired?: Yes     Periurethral laceration: right Repaired?: Yes     Vaginal laceration?: Yes Repaired?: Yes     Cervical laceration?: No    Clitoral laceration?: No    Quantitative blood loss (mL): 234            35 year old  at 39w1d was admitted for IOL for GDM on metformin, AMA, obesity (pre preg BMI 33). Pregnancy also significant for anxiety, h/o postpartum depression, anemia, h/o oligohydramnios, iron deficiency anemia, GBS+. Has been taking metformin 1000 mg BID    Misoprostol x 1 dose, then IV oxytocin, then amniotomy. Insulin drip. Epidural received.     She progressed to complete. Pushed with good efforts. Delivered a viable female infant via normal spontaneous vaginal delivery over an intact perineum via vertex presentation WILNER cephalic position. Delayed cord clamping was performed. Cord blood obtained. IV oxytocin administered. Placenta expressed apparently intact. Uterine tone was good. Perineum  inspected. Laceration(s): Hemostatic periurethral not repaired. Hymenal ring right side at 10 o'clock hymenal ring repaired with figure of eight 3-0 vicryl. 2nd degree perineal laceration. See Delivery report for QBL or EBL.      The American College of Obstetricians and Gynecologists (ACOG) defines postpartum hemorrhage (PPH) as cumulative blood loss > 1,000 mL or blood loss accompanied by signs or symptoms of hypovolemia within 24 hours after the birth process (includes intrapartum loss) regardless of route of delivery    The patient is stable, asymptomatic, and blood loss is within the expected amount following delivery. Standard treatment provided to prevent postpartum hemorrhage. Patient does not meet ACOG criteria for hemorrhage at this time.    Eliana Laura MD

## 2025-03-18 NOTE — PLAN OF CARE
Problem: BIRTH - VAGINAL/ SECTION  Goal: Fetal and maternal status remain reassuring during the birth process  Description: INTERVENTIONS:- Monitor vital signs- Monitor fetal heart rate- Monitor uterine activity- Monitor labor progression (vaginal delivery)- DVT prophylaxis (C/S delivery)- Surgical antibiotic prophylaxis (C/S delivery)  3/18/2025 1358 by Robert Crump RN  Outcome: Completed  3/18/2025 0712 by Robert Crump RN  Outcome: Progressing      5

## 2025-03-18 NOTE — PROGRESS NOTES
Pt is a 35 year old female admitted to 104/-A.     Chief Complaint   Patient presents with    Scheduled Induction     IOL- GDMA2      Pt is  39w0d intra-uterine pregnancy.  History obtained. Oriented to room, staff, and plan of care.

## 2025-03-18 NOTE — PLAN OF CARE
Problem: BIRTH - VAGINAL/ SECTION  Goal: Fetal and maternal status remain reassuring during the birth process  Description: INTERVENTIONS:- Monitor vital signs- Monitor fetal heart rate- Monitor uterine activity- Monitor labor progression (vaginal delivery)- DVT prophylaxis (C/S delivery)- Surgical antibiotic prophylaxis (C/S delivery)  Outcome: Progressing

## 2025-03-18 NOTE — PLAN OF CARE
Problem: BIRTH - VAGINAL/ SECTION  Goal: Fetal and maternal status remain reassuring during the birth process  Description: INTERVENTIONS:- Monitor vital signs- Monitor fetal heart rate- Monitor uterine activity- Monitor labor progression (vaginal delivery)- DVT prophylaxis (C/S delivery)- Surgical antibiotic prophylaxis (C/S delivery)  Outcome: Progressing     Problem: PAIN - ADULT  Goal: Verbalizes/displays adequate comfort level or patient's stated pain goal  Description: INTERVENTIONS:- Encourage pt to monitor pain and request assistance- Assess pain using appropriate pain scale- Administer analgesics based on type and severity of pain and evaluate response- Implement non-pharmacological measures as appropriate and evaluate response- Consider cultural and social influences on pain and pain management- Manage/alleviate anxiety- Utilize distraction and/or relaxation techniques- Monitor for opioid side effects- Notify MD/LIP if interventions unsuccessful or patient reports new pain- Anticipate increased pain with activity and pre-medicate as appropriate  Outcome: Progressing     Problem: ANXIETY  Goal: Will report anxiety at manageable levels  Description: INTERVENTIONS:- Administer medication as ordered- Teach and rehearse alternative coping skills- Provide emotional support with 1:1 interaction with staff  Outcome: Progressing     Problem: Patient/Family Goals  Goal: Patient/Family Long Term Goal  Description: Patient's Long Term Goal: safe, uncomplicated delivery of a healthy .   Interventions:- See additional Care Plan goals for specific interventions  Outcome: Progressing  Goal: Patient/Family Short Term Goal  Description: Patient's Short Term Goal: effective pain management and coping of the labor process.   Interventions: - See additional Care Plan goals for specific interventions  Outcome: Progressing     Problem: SAFETY ADULT - FALL  Goal: Free from fall injury  Description:  INTERVENTIONS:- Assess pt frequently for physical needs- Identify cognitive and physical deficits and behaviors that affect risk of falls.- [place] fall precautions as indicated by assessment.- Educate pt/family on patient safety including physical limitations- Instruct pt to call for assistance with activity based on assessment- Modify environment to reduce risk of injury- Provide assistive devices as appropriate- Consider OT/PT consult to assist with strengthening/mobility- Encourage toileting schedule  Outcome: Progressing

## 2025-03-18 NOTE — ANESTHESIA PROCEDURE NOTES
Labor Analgesia    Date/Time: 3/18/2025 5:44 AM    Performed by: Kash Madrigal MD  Authorized by: Kash Madrigal MD      General Information and Staff    Start Time:  3/18/2025 5:44 AM  End Time:  3/18/2025 5:52 AM  Anesthesiologist:  Kash Madrigal MD  Performed by:  Anesthesiologist  Patient Location:  OB  Site Identification: surface landmarks    Reason for Block: labor epidural    Preanesthetic Checklist: patient identified, IV checked, risks and benefits discussed, monitors and equipment checked, pre-op evaluation, timeout performed, IV bolus, anesthesia consent and sterile technique used      Procedure Details    Patient Position:  Sitting  Prep: ChloraPrep    Monitoring:  Heart rate and continuous pulse ox  Approach:  Midline    Epidural Needle    Injection Technique:  ANGY saline  Needle Type:  Tuohy  Needle Gauge:  17 G  Needle Length:  3.375 in  Needle Insertion Depth:  4  Location:  L3-4    Spinal Needle      Catheter    Catheter Type:  End hole  Catheter Size:  19 G  Catheter at Skin Depth:  8.5  Test Dose:  Negative    Assessment      Additional Comments

## 2025-03-18 NOTE — DISCHARGE INSTRUCTIONS
Discharge instructions after vaginal delivery:    Nothing in the vagina for 6 weeks   No strenuous activity/exercise  May shower immediately. May take bath  Keep wound(s) clean and dry. Wash daily with warm water & soap. Do not scrub. Gently pat dry. May cover with clean gauze or pad if needed.     AVOID CONSTIPATION:   -Take Miralax one capful in water or juice each morning.  You can also take each evening iif needed.  -Take Fiber supplement along with Miralax as well.  -May also take milk of magnesia or Dulcolax over the counter if needing to have a BM more urgently than Miralax is providing    -Do NOT strain for bowel movements    Please call office if:  -fever 100.4 or higher    Please proceed to the Emergency Department at [facility] for any of the following:   -vaginal bleeding soaking greater than 1 pad per hour  -severe pelvic pain  -shortness of breath  -chest pain  -leg pain or swelling     FOR PAIN:  Ibuprofen 600 mg every 6 hours as needed  6:00 and 12:00    Tylenol Extra Strength 1-2 tabs every 6 hours as needed  9:00 and 3:00      POST-PARTUM DEPRESSION SCREENING FOLLOW-UP RESOURCES  Mom's Line at [facility]: [phone]  [facility]  Hotline: (416) 851-9967  [facility]'s National Helpline: 3-418-209-HELP (2477)  Postpartum Support International HelpLine: 1-785.616.6129  The National [place] [employer] Mental Illness, (BK) Call 1-611.447.8721, or chat, or text \"Friend\" to 34562, or email helpline@[email or web address] to connect with us.  Cradle Talk Information  Cradle Talk Online meets virtually every Monday and Friday at 10 a.m. Contact [email or web address] for the current link to attend. Talk-education and social group for new parents of infant's  to six months old. Virtual Meetings: ,  and  from 10 - 11:00 am. For more information or to attend, email [email or web address]  For new and expectant moms looking for support with the transition to parenthood  as well as those experiencing symptoms of  anxiety and/or depression. Virtual meetings: Meets twice a month on  via [email or web address]. In-person meetings: Meets once a month immediately following Cradle Talk ([facility]) and once a month immediately following Mom & Baby Hour ([facility]). For more information or to attend, email [email or web address]  Depression During and After Pregnancy  If you've experienced a  mood disorder before, you have a 50-80 percent chance of experiencing this again. Women who have had major complications during pregnancy are twice as likely to have PMADs compared with those who had a relatively easy pregnancy. There is a 15-25 percent risk of PMADs in women with a prior history of depression or bipolar disorder, which significantly increases when moms discontinue medications during pregnancy.  If you had PMADs after past pregnancies, your doctor may prescribe antidepressant medication after you deliver to reduce the chances of PMADs returning. While  mood and anxiety disorders cannot be prevented, here are some ways to help yourself:  Plan ahead - Find someone who can help with household chores and the baby during your first week home from the hospital; choose  so you get a break on an ongoing basis; and decide ahead of time what you need to have on hand when the baby arrives.  Educate yourself - Take prenatal classes to teach parenting skills, reliance on assistance from support personnel including spouses, family members, friends and neighbors.  Stay on top of symptoms - Screening questionnaires may help detect depression or risks for depression early. Do your best to answer screening questions honestly so that your behavioral health provider can help you.  Talk about your feelings - Talk about your fears and anxieties before the baby arrives, and continue these discussions after delivery. Meeting with a professional counselor, attending  support groups, and having good social support from loved ones can all help to reduce the symptoms of PMADs. Call the Mom's Line at [phone] - This is a phone line dedicated to women or anyone concerned about a woman, who may be experiencing signs or symptoms of  mood and anxiety, postpartum depression and/or psychosis. A specialist can provide recommendations based on symptoms and/or information on PMADs and/or treatment. Messages on this line are checked daily and calls will be returned within 24 hours.  [facility]   Mom's Line: [phone] This service is provided by [facility]. This phone line is dedicated for women (or anyone worried about a woman) who may be experiencing signs or symptoms of postpartum depression.  At [facility], we're dedicated to helping you achieve complete recovery, so we treat the whole person - biologically, psychologically, socially and spiritually. For admission or enrollment in our inpatient or outpatient services, you will need to set up a confidential behavioral health assessment.  Getting started: Behavioral health assessment  Just like with any other illness, the first step toward feeling better is identifying the problem. Our behavioral health assessment is a confidential evaluation of your symptoms so we can better understand your needs. The information we collect helps us create a customized treatment plan for you. The assessment, conducted by one of our licensed counselors, usually takes one to three hours.  The assessment includes:  A one-on-one meeting with a licensed counselor to discuss your concerns; he or she will also gather your medical and psychiatric history, identification ('s license or state ID) and insurance information  A level of care and program recommendation  We have convenient locations in [facility] and [place]. To make an  appointment for an assessment at any of our locations, call our Help Line 24/7 at [phone].  Helpful Websites  www.[email or web address]  [email or web address]  [email or web address]    Outpatient Therapy Providers for Post-Partum  [facility]  [address]  [place], [place] [address] [phone] / [phone]  [facility] [place], [place], [place], [place]  [phone]  [facility]  [address]  [place], [place] [address] [phone]  [facility]  Online, Every Tuesday 2:30-4pm   [phone]  [facility]  [place], [place], and [place] Offices  [phone]  [facility]  [place] and [place], [place]   [phone]  [facility]  [place], [place]   [phone]  [facility]  [address] [place] [address]   [phone]      Women, Infants & Children (WIC) Program  [address] [place] [address]  Phone: [phone]  [place] [address] [place] [address] [phone]  Closes 6 PM      [employer] [place] ([facility]) Special Beginnings is a confidential maternity program designed to help you better understand and manage your pregnancy.    Get the support you need from early pregnancy until 6 weeks after delivery, including:    Pregnancy risk factor identification and ongoing communication/monitoring  Education material covering pregnancy and infant care topics, including a comprehensive educational book  Personal telephone contact with program staff  Assistance in managing high-risk conditions, such as gestational diabetes and preeclampsia    To enroll in the Special Beginnings Program, call [phone].

## 2025-03-19 LAB
BASOPHILS # BLD AUTO: 0.07 X10(3) UL (ref 0–0.2)
BASOPHILS NFR BLD AUTO: 0.4 %
EOSINOPHIL # BLD AUTO: 0.19 X10(3) UL (ref 0–0.7)
EOSINOPHIL NFR BLD AUTO: 1.1 %
ERYTHROCYTE [DISTWIDTH] IN BLOOD BY AUTOMATED COUNT: 14.9 %
GLUCOSE BLD-MCNC: 110 MG/DL (ref 70–99)
HCT VFR BLD AUTO: 31.4 %
HGB BLD-MCNC: 10 G/DL
IMM GRANULOCYTES # BLD AUTO: 0.12 X10(3) UL (ref 0–1)
IMM GRANULOCYTES NFR BLD: 0.7 %
LYMPHOCYTES # BLD AUTO: 2.49 X10(3) UL (ref 1–4)
LYMPHOCYTES NFR BLD AUTO: 14.5 %
MCH RBC QN AUTO: 27.4 PG (ref 26–34)
MCHC RBC AUTO-ENTMCNC: 31.8 G/DL (ref 31–37)
MCV RBC AUTO: 86 FL
MONOCYTES # BLD AUTO: 1.56 X10(3) UL (ref 0.1–1)
MONOCYTES NFR BLD AUTO: 9.1 %
NEUTROPHILS # BLD AUTO: 12.78 X10 (3) UL (ref 1.5–7.7)
NEUTROPHILS # BLD AUTO: 12.78 X10(3) UL (ref 1.5–7.7)
NEUTROPHILS NFR BLD AUTO: 74.2 %
PLATELET # BLD AUTO: 202 10(3)UL (ref 150–450)
RBC # BLD AUTO: 3.65 X10(6)UL
WBC # BLD AUTO: 17.2 X10(3) UL (ref 4–11)

## 2025-03-19 NOTE — CM/SW NOTE
03/19/25 1200   Access to Medications   Do you have trouble affording medicines, medical supplies, or paying for your care? N   Utilities   In the past 12 months has the electric, gas, oil, or water company threatened to shut off services in your home? No   Food Insecurity   Within the past 12 months, did you worry that your food would run out before you got money to buy more? N   Within the past 12 months, did the food you bought just not last and you didn’t have money to get more? N   Did patient receive WIC with this pregnancy? No   Transportation Needs   Within the past 12 months, has lack of transportation kept you from medical appointments, getting your medicines, non-medical meetings or appointments, work, or from getting things that you need? N   Will you have difficulty in obtaining a car seat for when you are going home with your baby? No   Housing/Utilities   Do you have housing? Y   Are you worried about losing your housing? N   Within the past 12 months, have you or your family members you live with been unable to get utilities (heat, electricity) when it was really needed? N   Will you have difficulty in obtaining a bassinet or crib for when you are going home with your baby? No   Interpersonal Safety   Do you feel physically and emotionally safe where you currently live? Y   Within the past 12 months, have you been hit, slapped, kicked or otherwise physically hurt by someone? N   Within the past 12 months, have you been humiliated or emotionally abused in other ways by your partner or ex-partner? N   Does healthcare provider observe any obvious signs or symptoms of abuse/neglect? No   Stress   Would you like help finding professional services to help with stress, depression, anxiety, or other mental health concerns? N   Were you screened prenatally for any mood disorders during pregnancy? Yes   Did you receive care for any mood disorders during your pregnancy? No   OUD Screening Risk Assessment    Did any of your parents have problems with alcohol or drug use? No   Do any of your friends (peers) have problems with alcohol or drug use? No   Does your partner have a problem with alcohol or drug use? No   Before you were pregnant did you have problems with alcohol or drug use, including prescription medications in the past? No   Did you drink beer, wine, or liquor in the past month, consume, smoke, or vape any marijuana products, use illegal drugs, and/or prescribed or non-prescribed pain medications? No     Case discussed with RN, and psych liaison. SW met with pt to complete assessment and discuss DC planning needs.    Pt reports she lives in [place] with her 7 y/o son. Per chart review, father of the baby is involved but not able to support physically as he is in the process of being deported. Pt sister is planning to stay with her upon DC to assist pt. Pt scored a 15 on the EPDS completed after delivery. Psych liaison met with pt to provide resources.    Discussed [place] will meet with her to assist with Medicaid add on for baby. Discussed [facility] and their Special Beginnings program. Encouraged to apply for WIC/SNAP. Information in AVS.    Discussed PCP for baby. Informed pt to call PCP office as they may not accept pt with Medicaid. If not able to accept discussed calling [facility]. Pt verbalized understanding. Pt denies any further questions, or concerns at this time.    JOEY/ANKIT to remain available for dc planning, and/or additional need for support.    MARY Castillo  Discharge Planner  [phone]

## 2025-03-19 NOTE — PROGRESS NOTES
POD#1  Patient has no complaints, lochia minimal    BP 92/67 (BP Location: Right arm)   Pulse 75   Temp 97.7 °F (36.5 °C) (Oral)   Resp 16   Ht 58\"   Wt 161 lb (73 kg)   LMP 2024 (Exact Date)   SpO2 100%   Breastfeeding Yes   BMI 33.65 kg/m²     Recent Labs   Lab 25  2138 25  0640   RBC 3.89 3.65*   HGB 10.5* 10.0*   HCT 33.2* 31.4*   MCV 85.3 86.0   MCH 27.0 27.4   MCHC 31.6 31.8   RDW 15.0 14.9   NEPRELIM 8.91* 12.78*   WBC 12.7* 17.2*   .0 202.0       Abdomen: soft, NT/ND  Uterus:firm, below umbilicus    A/P: s/p   - plan home tomorrow

## 2025-03-19 NOTE — PLAN OF CARE
Problem: POSTPARTUM  Goal: Long Term Goal:Experiences normal postpartum course  Description: INTERVENTIONS:- Assess and monitor vital signs and lab values.- Assess fundus and lochia.- Provide ice/sitz baths for perineum discomfort.- Monitor healing of incision/episiotomy/laceration, and assess for signs and symptoms of infection and hematoma.- Assess bladder function and monitor for bladder distention.- Provide/instruct/assist with pericare as needed.- Provide VTE prophylaxis as needed.- Monitor bowel function.- Encourage ambulation and provide assistance as needed.- Assess and monitor emotional status and provide social service/psych resources as needed.- Utilize standard precautions and use personal protective equipment as indicated. Ensure aseptic care of all intravenous lines and invasive tubes/drains.- Obtain immunization and exposure to communicable diseases history.  Outcome: Adequate for Discharge  Goal: Optimize infant feeding at the breast  Description: INTERVENTIONS:- Initiate breast feeding within first hour after birth. - Monitor effectiveness of current breast feeding efforts.- Assess support systems available to mother/family.- Identify cultural beliefs/practices regarding lactation, letdown techniques, maternal food preferences.- Assess mother's knowledge and previous experience with breast feeding.- Provide information as needed about early infant feeding cues (e.g., rooting, lip smacking, sucking fingers/hand) versus late cue of crying.- Discuss/demonstrate breast feeding aids (e.g.,  breast pump).- Encourage mother/other family members to express feelings/concerns, and actively listen.- Educate father/SO about benefits of breast feeding and how to manage common lactation challenges.- Recommend avoidance of specific medications or substances incompatible with breast feeding.- Assess and monitor for signs of nipple pain/trauma.- Instruct and provide assistance with proper latch.- Review techniques  for milk expression (breast pumping) and storage of breast milk. Provide pumping equipment/supplies, instructions and assistance, as needed.- Encourage rooming-in and breast feeding on demand.- Encourage skin-to-skin contact.- Provide LC support as needed.- Assess for and manage engorgement.- Provide breast feeding education handouts and information on community breast feeding support.   Outcome: Adequate for Discharge  Goal: Establishment of adequate milk supply with medication/procedure interruptions  Description: INTERVENTIONS:- Review techniques for milk expression (breast pumping). - Provide pumping equipment/supplies, instructions, and assistance until it is safe to breastfeed infant.  Outcome: Adequate for Discharge  Goal: Appropriate maternal -  bonding  Description: INTERVENTIONS:- Assess caregiver- interactions.- Assess caregiver's emotional status and coping mechanisms.- Encourage caregiver to participate in  daily care.- Assess support systems available to mother/family.- Provide /case management support as needed.  Outcome: Adequate for Discharge

## 2025-03-19 NOTE — PROGRESS NOTES
Labor Analgesia Follow Up Note    Patient underwent epidural anesthesia for labor analgesia,    Placenta Date/Time: 3/18/2025  1:16 PM    Delivery Date/Time:: 3/18/2025  1:12 PM    BP 92/67 (BP Location: Right arm)   Pulse 75   Temp 97.7 °F (36.5 °C) (Oral)   Resp 16   Ht 1.473 m (4' 10\")   Wt 73 kg (161 lb)   LMP 05/07/2024 (Exact Date)   SpO2 100%   Breastfeeding Yes   BMI 33.65 kg/m²     Assessment:  Patient seen and no apparent anesthesia related complications.    Thank you for asking us to participate in the care of your patient.

## 2025-03-19 NOTE — PLAN OF CARE
Motrin/Tylenol for cramping, good relief from warm pack  Sister in room, father of baby in [place], able to facetime, pt has phone call re immigration status today.    Problem: POSTPARTUM  Goal: Long Term Goal:Experiences normal postpartum course  Description: INTERVENTIONS:- Assess and monitor vital signs and lab values.- Assess fundus and lochia.- Provide ice/sitz baths for perineum discomfort.- Monitor healing of incision/episiotomy/laceration, and assess for signs and symptoms of infection and hematoma.- Assess bladder function and monitor for bladder distention.- Provide/instruct/assist with pericare as needed.- Provide VTE prophylaxis as needed.- Monitor bowel function.- Encourage ambulation and provide assistance as needed.- Assess and monitor emotional status and provide social service/psych resources as needed.- Utilize standard precautions and use personal protective equipment as indicated. Ensure aseptic care of all intravenous lines and invasive tubes/drains.- Obtain immunization and exposure to communicable diseases history.  Outcome: Progressing  Goal: Optimize infant feeding at the breast  Description: INTERVENTIONS:- Initiate breast feeding within first hour after birth. - Monitor effectiveness of current breast feeding efforts.- Assess support systems available to mother/family.- Identify cultural beliefs/practices regarding lactation, letdown techniques, maternal food preferences.- Assess mother's knowledge and previous experience with breast feeding.- Provide information as needed about early infant feeding cues (e.g., rooting, lip smacking, sucking fingers/hand) versus late cue of crying.- Discuss/demonstrate breast feeding aids (e.g.,  breast pump).- Encourage mother/other family members to express feelings/concerns, and actively listen.- Educate father/SO about benefits of breast feeding and how to manage common lactation challenges.- Recommend avoidance of specific medications or substances  incompatible with breast feeding.- Assess and monitor for signs of nipple pain/trauma.- Instruct and provide assistance with proper latch.- Review techniques for milk expression (breast pumping) and storage of breast milk. Provide pumping equipment/supplies, instructions and assistance, as needed.- Encourage rooming-in and breast feeding on demand.- Encourage skin-to-skin contact.- Provide LC support as needed.- Assess for and manage engorgement.- Provide breast feeding education handouts and information on community breast feeding support.   Outcome: Progressing  Goal: Establishment of adequate milk supply with medication/procedure interruptions  Description: INTERVENTIONS:- Review techniques for milk expression (breast pumping). - Provide pumping equipment/supplies, instructions, and assistance until it is safe to breastfeed infant.  Outcome: Progressing  Goal: Appropriate maternal -  bonding  Description: INTERVENTIONS:- Assess caregiver- interactions.- Assess caregiver's emotional status and coping mechanisms.- Encourage caregiver to participate in  daily care.- Assess support systems available to mother/family.- Provide /case management support as needed.  Outcome: Progressing

## 2025-03-19 NOTE — BH PROGRESS NOTE
KEVYN PPD SCREENING    Reason for Referral: EPDS = 15    Current Stressors:    History of PPD: Yes, pt reports symptoms of post partum depression following the birth of her first son (6 and a half years ago) as evidenced by lack of motivation, frequent crying, and overall low mood. Pt reports she did not experiencing suicidal or homicidal thoughts during the post partum depression episode. Pt reports she did seek outpatient therapy services although she was unsatisfied with the outcome as it was \"just talking.\"    Financial: Pt reports financial concerns related to paying for diapers and \"making ends meet.\"    Other: Pt reports her  was deported from the [place], it is unknown when the  was reported. Pt reports that from 10:30-11:30 am today she has a call today regarding the deportation of her . Pt reports her current living situation and employment status is stable. Pt reports that her sister from [place] is living with her temporarily for 3 weeks to assist with taking care of her  and 6.5 year old son. Pt reports she does not have any other family/friends in the area. Pt reports she had no been sleeping for the past several months due to stress. Reports last night was the first night she was able to sleep as others were helping her in the hospital as well as her sister.     Mental Health Status:    Current Symptoms: Pt appears calm and content, incongruent to the situation.   Appearance/General Behavior: Yes groom, cooperative.   General Cognitive Functioning: Intact   Thought Process: Linear   Thought Content: Goal-directed   Mood/Affect: Calm and content, affect incongruent to the situation   Orientation: Aox4   Speech: Normal volume, rhythm, speech, tone   Homicidal/Suicidal Ideation/Plan: Denies HI/SI.     Living Arrangement:    Who Resides at Home: Self, 6,5 year old, and    Has other Children: Yes, 6.5 year old son   Is Father of the Baby involved: Father is involved  from a distance, only available via phone. Pt is unsure when she will see her  again.    Has Familial Support: Yes, but all family lives out of state.    Has Other Support Network: Denies. Pt does not want a therapist, reports she believes she can handle her situation on her own and reports that if post-partum depression symptoms return, she will manage with \"exercise and focusing on myself\" and \"getting a hormonal test.\" Encouraged pt to seek resources below.     Plan: Informed RN of above information. Personally, I reached out to the manager of case management to inquire of  working on case to inform them that pt is inquiring about diaper assistance. RN reported to this writer pt was inquiring about WIC benefits.   POST-PARTUM DEPRESSION SCREENING FOLLOW-UP RESOURCES  Mom's Line at [facility]: [phone]  [facility]  Hotline: (320) 850-8932  [facility] National Helpline: 8-789-077-HELP (6386)  Postpartum Support International HelpLine: 1-251.417.1517  The National [place] on Mental Illness, (BK) Call 1-214.667.5865, or chat, or text \"Friend\" to 85426, or email helpline@[employer] to connect with us.  Cradle Talk Information  Cradle Talk Online meets virtually every Monday and Friday at 10 a.m. Contact [email or web address] for the current link to attend. Talk-education and social group for new parents of infant's  to six months old. Virtual Meetings: ,  and  from 10 - 11:00 am. For more information or to attend, email [email or web address]  For new and expectant moms looking for support with the transition to parenthood as well as those experiencing symptoms of  anxiety and/or depression. Virtual meetings: Meets twice a month on  via [employer]. In-person meetings: Meets once a month immediately following Cradle Talk ([facility]) and once a month immediately following Mom & Baby Hour ([facility]). For more  information or to attend, email [email or web address]  Depression During and After Pregnancy  If you've experienced a  mood disorder before, you have a 50-80 percent chance of experiencing this again. Women who have had major complications during pregnancy are twice as likely to have PMADs compared with those who had a relatively easy pregnancy. There is a 15-25 percent risk of PMADs in women with a prior history of depression or bipolar disorder, which significantly increases when moms discontinue medications during pregnancy.  If you had PMADs after past pregnancies, your doctor may prescribe antidepressant medication after you deliver to reduce the chances of PMADs returning. While  mood and anxiety disorders cannot be prevented, here are some ways to help yourself:  Plan ahead - Find someone who can help with household chores and the baby during your first week home from the hospital; choose  so you get a break on an ongoing basis; and decide ahead of time what you need to have on hand when the baby arrives.  Educate yourself - Take prenatal classes to teach parenting skills, reliance on assistance from support personnel including spouses, family members, friends and neighbors.  Stay on top of symptoms - Screening questionnaires may help detect depression or risks for depression early. Do your best to answer screening questions honestly so that your behavioral health provider can help you.  Talk about your feelings - Talk about your fears and anxieties before the baby arrives, and continue these discussions after delivery. Meeting with a professional counselor, attending support groups, and having good social support from loved ones can all help to reduce the symptoms of PMADs. Call the Mom's Line at [phone] - This is a phone line dedicated to women or anyone concerned about a woman, who may be experiencing signs or symptoms of  mood and anxiety, postpartum depression  and/or psychosis. A specialist can provide recommendations based on symptoms and/or information on PMADs and/or treatment. Messages on this line are checked daily and calls will be returned within 24 hours.  [facility]   Mom's Line: [phone] This service is provided by [facility]. This phone line is dedicated for women (or anyone worried about a woman) who may be experiencing signs or symptoms of postpartum depression.  At [facility], we're dedicated to helping you achieve complete recovery, so we treat the whole person - biologically, psychologically, socially and spiritually. For admission or enrollment in our inpatient or outpatient services, you will need to set up a confidential behavioral health assessment.  Getting started: Behavioral health assessment  Just like with any other illness, the first step toward feeling better is identifying the problem. Our behavioral health assessment is a confidential evaluation of your symptoms so we can better understand your needs. The information we collect helps us create a customized treatment plan for you. The assessment, conducted by one of our licensed counselors, usually takes one to three hours.  The assessment includes:  A one-on-one meeting with a licensed counselor to discuss your concerns; he or she will also gather your medical and psychiatric history, identification ('s license or state ID) and insurance information  A level of care and program recommendation  We have convenient locations in [facility] and [place]. To make an appointment for an assessment at any of our locations, call our Help Line 24/7 at [phone].  Helpful Websites  www.[email or web address]  www.[email or web address]  [email or web address]    Outpatient Therapy Providers for Post-Partum  [facility]  [address]  [place] [address]   [phone] / [phone]  [facility] [place], [place], [place], [place]  [phone]  [facility]  [address] [place] [address]  [phone]  [facility]  Online, Every Tuesday 2:30-4pm   [phone]  Flavio Bhandari, [facility]  [place], [place], [place], and [place] Offices  [phone]  [facility]  [place], [place]   [phone]  [facility]  [place], [place]   [phone]  [facility]  [address] [place] [address]   [phone]

## 2025-03-20 VITALS
TEMPERATURE: 98 F | SYSTOLIC BLOOD PRESSURE: 89 MMHG | BODY MASS INDEX: 33.8 KG/M2 | WEIGHT: 161 LBS | HEART RATE: 70 BPM | RESPIRATION RATE: 16 BRPM | HEIGHT: 58 IN | DIASTOLIC BLOOD PRESSURE: 55 MMHG | OXYGEN SATURATION: 100 %

## 2025-03-20 LAB — GLUCOSE BLD-MCNC: 88 MG/DL (ref 70–99)

## 2025-03-24 ENCOUNTER — TELEPHONE (OUTPATIENT)
Dept: OBGYN UNIT | Facility: HOSPITAL | Age: 35
End: 2025-03-24

## 2025-04-04 ENCOUNTER — TELEMEDICINE (OUTPATIENT)
Dept: FAMILY MEDICINE CLINIC | Facility: CLINIC | Age: 35
End: 2025-04-04
Payer: COMMERCIAL

## 2025-04-04 DIAGNOSIS — R52 BODY ACHES: Primary | ICD-10-CM

## 2025-04-04 PROCEDURE — 98005 SYNCH AUDIO-VIDEO EST LOW 20: CPT | Performed by: STUDENT IN AN ORGANIZED HEALTH CARE EDUCATION/TRAINING PROGRAM

## 2025-04-04 NOTE — PROGRESS NOTES
Telemedicine video encounter documentation    This video encounter was conducted with the patient's (or proxy's) verbal consent via secure, interactive audio and video telecommunications.      The patient (or proxy) was instructed to have this encounter in a suitably private space and to only have persons present to whom they give permission to participate. In addition, patient identity was confirmed by use of name plus two identifiers.      Chief Complaint:     Fatigue      Subjective:     Regina Garcias is a 35 year old female established patient. Recent pregnancy and delivery 3/18/2025. Pregnancy complicated by Gestational diabetes, iron deficiency anemia. Video visit today for:    History of Present Illness  Regina Garcias is a 35 year old female who presents with generalized pain and concerns about vitamin deficiencies post-pregnancy.    She experiences generalized pain throughout her body, affecting her bones and present during basic activities such as sitting, standing, and sleeping. She attributes this pain to a lack of self-care during her recent pregnancy.    She is concerned about potential deficiencies in calcium and vitamin D, as she did not consistently take her prenatal vitamins during pregnancy. Her mother experienced bone depletion after childbirth, which heightens her concern. She is exclusively breastfeeding her  and co-sleeping to manage sleep, which she describes as fragmented but sufficient. She takes short naps throughout the day and gets approximately two hours of sleep at a time during the night.    Her past medical history includes anemia and gestational diabetes during her recent pregnancy. She is currently managing her responsibilities alone with two children, including a .    No symptoms of depression or baby blues. She acknowledges situational stress due to managing two young children alone.          Patient Active Problem List   Diagnosis    Vitamin D  deficiency    Hirsutism    Pregnancy ([facility])    Obesity affecting pregnancy in third trimester ([facility])    Status post induction of labor    Gestational diabetes mellitus (GDM) in third trimester controlled on oral hypoglycemic drug ([facility])    AMA (advanced maternal age) multigravida 35+, third trimester ([facility])    GBS (group B Streptococcus carrier), +RV culture, currently pregnant ([facility])    History of postpartum depression, currently pregnant in third trimester ([facility])    Maternal iron deficiency anemia affecting pregnancy in third trimester, antepartum ([facility])       Medications Prior to Visit[1]    Social History and Allergies reviewed.    ROS   See HPI for other ROS    Objective:     Vitals as reported by patient:    Constitutional: well-apearing  Mental status: alert and oriented  Respiratory: no labored breathing, speaks in full sentences      Assessment/Plan:     There are no diagnoses linked to this encounter.    Assessment & Plan  Postpartum musculoskeletal pain  Widespread pain likely due to postpartum changes and physical demands rather than vitamin deficiencies. Consideration of calcium and vitamin D deficiencies due to history of bone depletion post-childbirth.  - Order comprehensive metabolic panel.  - Order blood counts.  - Order vitamin D level check.  - Encourage diet rich in vitamin D and calcium.    Gestational diabetes  Increased risk for type 2 diabetes post-gestational diabetes. Normal A1c in hospital, requires monitoring.  - Schedule follow-up in three months to recheck A1c.    Iron deficiency anemia  Iron deficiency anemia during pregnancy. Emphasized diet rich in iron and protein, continue prenatal vitamins.  - Encourage diet rich in iron and protein.  - Continue prenatal vitamins.    Vitamin D deficiency  Potential vitamin D deficiency contributing to musculoskeletal pain. Treatment remains the same regardless of vitamin D level results.  - Order vitamin D level check.  - Encourage diet rich in vitamin D  and calcium.      [facility]    No follow-ups on file.    There are no Patient Instructions on file for this visit.          [1]   Outpatient Medications Prior to Visit   Medication Sig Dispense Refill    Prenatal MV-Min-Fe Fum-FA-DHA (PRENATAL/FOLIC ACID+DHA) 27-0.8-200 MG Oral Cap Take 1 tablet by mouth daily. 30 capsule 0     No facility-administered medications prior to visit.

## 2025-04-18 ENCOUNTER — LAB ENCOUNTER (OUTPATIENT)
Dept: LAB | Age: 35
End: 2025-04-18
Attending: STUDENT IN AN ORGANIZED HEALTH CARE EDUCATION/TRAINING PROGRAM
Payer: COMMERCIAL

## 2025-04-18 DIAGNOSIS — R52 BODY ACHES: ICD-10-CM

## 2025-04-18 LAB
ALBUMIN SERPL-MCNC: 4.5 G/DL (ref 3.2–4.8)
ALBUMIN/GLOB SERPL: 1.7 {RATIO} (ref 1–2)
ALP LIVER SERPL-CCNC: 120 U/L (ref 37–98)
ALT SERPL-CCNC: 34 U/L (ref 10–49)
ANION GAP SERPL CALC-SCNC: 9 MMOL/L (ref 0–18)
AST SERPL-CCNC: 22 U/L (ref ?–34)
BASOPHILS # BLD AUTO: 0.07 X10(3) UL (ref 0–0.2)
BASOPHILS NFR BLD AUTO: 0.9 %
BILIRUB SERPL-MCNC: 0.6 MG/DL (ref 0.3–1.2)
BUN BLD-MCNC: 9 MG/DL (ref 9–23)
CALCIUM BLD-MCNC: 9.3 MG/DL (ref 8.7–10.6)
CHLORIDE SERPL-SCNC: 110 MMOL/L (ref 98–112)
CO2 SERPL-SCNC: 24 MMOL/L (ref 21–32)
CREAT BLD-MCNC: 0.71 MG/DL (ref 0.55–1.02)
EGFRCR SERPLBLD CKD-EPI 2021: 114 ML/MIN/1.73M2 (ref 60–?)
EOSINOPHIL # BLD AUTO: 0.42 X10(3) UL (ref 0–0.7)
EOSINOPHIL NFR BLD AUTO: 5.4 %
ERYTHROCYTE [DISTWIDTH] IN BLOOD BY AUTOMATED COUNT: 14.7 %
FASTING STATUS PATIENT QL REPORTED: YES
GLOBULIN PLAS-MCNC: 2.6 G/DL (ref 2–3.5)
GLUCOSE BLD-MCNC: 97 MG/DL (ref 70–99)
HCT VFR BLD AUTO: 38.7 % (ref 35–48)
HGB BLD-MCNC: 12.1 G/DL (ref 12–16)
IMM GRANULOCYTES # BLD AUTO: 0.02 X10(3) UL (ref 0–1)
IMM GRANULOCYTES NFR BLD: 0.3 %
LYMPHOCYTES # BLD AUTO: 2.69 X10(3) UL (ref 1–4)
LYMPHOCYTES NFR BLD AUTO: 34.4 %
MCH RBC QN AUTO: 26.5 PG (ref 26–34)
MCHC RBC AUTO-ENTMCNC: 31.3 G/DL (ref 31–37)
MCV RBC AUTO: 84.9 FL (ref 80–100)
MONOCYTES # BLD AUTO: 0.48 X10(3) UL (ref 0.1–1)
MONOCYTES NFR BLD AUTO: 6.1 %
NEUTROPHILS # BLD AUTO: 4.14 X10 (3) UL (ref 1.5–7.7)
NEUTROPHILS # BLD AUTO: 4.14 X10(3) UL (ref 1.5–7.7)
NEUTROPHILS NFR BLD AUTO: 52.9 %
OSMOLALITY SERPL CALC.SUM OF ELEC: 295 MOSM/KG (ref 275–295)
PLATELET # BLD AUTO: 287 10(3)UL (ref 150–450)
POTASSIUM SERPL-SCNC: 4.1 MMOL/L (ref 3.5–5.1)
PROT SERPL-MCNC: 7.1 G/DL (ref 5.7–8.2)
RBC # BLD AUTO: 4.56 X10(6)UL (ref 3.8–5.3)
SODIUM SERPL-SCNC: 143 MMOL/L (ref 136–145)
VIT D+METAB SERPL-MCNC: 33.6 NG/ML (ref 30–100)
WBC # BLD AUTO: 7.8 X10(3) UL (ref 4–11)

## 2025-04-18 PROCEDURE — 85025 COMPLETE CBC W/AUTO DIFF WBC: CPT | Performed by: STUDENT IN AN ORGANIZED HEALTH CARE EDUCATION/TRAINING PROGRAM

## 2025-04-18 PROCEDURE — 82306 VITAMIN D 25 HYDROXY: CPT | Performed by: STUDENT IN AN ORGANIZED HEALTH CARE EDUCATION/TRAINING PROGRAM

## 2025-04-18 PROCEDURE — 80053 COMPREHEN METABOLIC PANEL: CPT | Performed by: STUDENT IN AN ORGANIZED HEALTH CARE EDUCATION/TRAINING PROGRAM

## 2025-05-07 PROBLEM — O99.891 HISTORY OF POSTPARTUM DEPRESSION, CURRENTLY PREGNANT IN THIRD TRIMESTER (HCC): Status: RESOLVED | Noted: 2025-03-18 | Resolved: 2025-05-07

## 2025-05-07 PROBLEM — Z98.890 STATUS POST INDUCTION OF LABOR: Status: RESOLVED | Noted: 2025-03-18 | Resolved: 2025-05-07

## 2025-05-07 PROBLEM — O99.820 GBS (GROUP B STREPTOCOCCUS CARRIER), +RV CULTURE, CURRENTLY PREGNANT (HCC): Status: RESOLVED | Noted: 2025-03-18 | Resolved: 2025-05-07

## 2025-05-07 PROBLEM — O99.213 OBESITY AFFECTING PREGNANCY IN THIRD TRIMESTER (HCC): Status: RESOLVED | Noted: 2025-03-18 | Resolved: 2025-05-07

## 2025-05-07 PROBLEM — O09.523 AMA (ADVANCED MATERNAL AGE) MULTIGRAVIDA 35+, THIRD TRIMESTER (HCC): Status: RESOLVED | Noted: 2025-03-18 | Resolved: 2025-05-07

## 2025-05-07 PROBLEM — Z86.59 HISTORY OF POSTPARTUM DEPRESSION: Status: ACTIVE | Noted: 2025-05-07

## 2025-05-07 PROBLEM — Z87.59 HISTORY OF PLACENTAL ABNORMALITY: Status: ACTIVE | Noted: 2025-03-18

## 2025-05-07 PROBLEM — Z86.59 HISTORY OF POSTPARTUM DEPRESSION, CURRENTLY PREGNANT IN THIRD TRIMESTER (HCC): Status: RESOLVED | Noted: 2025-03-18 | Resolved: 2025-05-07

## 2025-05-07 PROBLEM — O24.415 GESTATIONAL DIABETES MELLITUS (GDM) IN THIRD TRIMESTER CONTROLLED ON ORAL HYPOGLYCEMIC DRUG (HCC): Status: RESOLVED | Noted: 2025-03-18 | Resolved: 2025-05-07

## 2025-05-07 PROBLEM — Z87.59 HISTORY OF POSTPARTUM DEPRESSION: Status: ACTIVE | Noted: 2025-05-07

## 2025-05-07 PROBLEM — Z34.90 PREGNANCY (HCC): Status: RESOLVED | Noted: 2025-03-17 | Resolved: 2025-05-07

## 2025-05-07 PROBLEM — D50.9 MATERNAL IRON DEFICIENCY ANEMIA AFFECTING PREGNANCY IN THIRD TRIMESTER, ANTEPARTUM (HCC): Status: RESOLVED | Noted: 2025-03-18 | Resolved: 2025-05-07

## 2025-05-07 PROBLEM — O99.013 MATERNAL IRON DEFICIENCY ANEMIA AFFECTING PREGNANCY IN THIRD TRIMESTER, ANTEPARTUM (HCC): Status: RESOLVED | Noted: 2025-03-18 | Resolved: 2025-05-07

## 2025-05-07 PROBLEM — Z86.2 HISTORY OF IRON DEFICIENCY ANEMIA: Status: ACTIVE | Noted: 2025-05-07

## 2025-05-07 PROBLEM — E66.9 OBESITY (BMI 30-39.9): Status: ACTIVE | Noted: 2025-05-07

## 2025-05-07 PROBLEM — Z86.32 HISTORY OF GESTATIONAL DIABETES: Status: ACTIVE | Noted: 2025-05-07

## 2025-06-04 ENCOUNTER — TELEPHONE (OUTPATIENT)
Facility: CLINIC | Age: 35
End: 2025-06-04

## 2025-06-04 NOTE — TELEPHONE ENCOUNTER
Just called pt to let her know she missed her appt on 06- at 1:00 pm with dr BLANCAS. Message left on her voicemail. Thanks

## 2025-07-31 ENCOUNTER — TELEMEDICINE (OUTPATIENT)
Dept: FAMILY MEDICINE CLINIC | Facility: CLINIC | Age: 35
End: 2025-07-31

## 2025-07-31 DIAGNOSIS — L60.0 INGROWN TOENAIL: ICD-10-CM

## 2025-07-31 DIAGNOSIS — Z86.32 HISTORY OF GESTATIONAL DIABETES: Primary | ICD-10-CM

## 2025-07-31 DIAGNOSIS — B35.0 TINEA CAPITIS: ICD-10-CM

## 2025-07-31 PROCEDURE — 98006 SYNCH AUDIO-VIDEO EST MOD 30: CPT | Performed by: STUDENT IN AN ORGANIZED HEALTH CARE EDUCATION/TRAINING PROGRAM

## 2025-07-31 RX ORDER — KETOCONAZOLE 20 MG/ML
SHAMPOO, SUSPENSION TOPICAL
Qty: 120 ML | Refills: 0 | Status: SHIPPED | OUTPATIENT
Start: 2025-07-31

## 2025-08-03 ENCOUNTER — PATIENT MESSAGE (OUTPATIENT)
Dept: FAMILY MEDICINE CLINIC | Facility: CLINIC | Age: 35
End: 2025-08-03

## 2025-08-04 ENCOUNTER — OFFICE VISIT (OUTPATIENT)
Dept: PODIATRY CLINIC | Facility: CLINIC | Age: 35
End: 2025-08-04

## 2025-08-04 ENCOUNTER — LAB ENCOUNTER (OUTPATIENT)
Dept: LAB | Age: 35
End: 2025-08-04
Attending: STUDENT IN AN ORGANIZED HEALTH CARE EDUCATION/TRAINING PROGRAM

## 2025-08-04 VITALS — DIASTOLIC BLOOD PRESSURE: 70 MMHG | SYSTOLIC BLOOD PRESSURE: 132 MMHG

## 2025-08-04 DIAGNOSIS — M79.674 PAIN OF TOE OF RIGHT FOOT: ICD-10-CM

## 2025-08-04 DIAGNOSIS — Z86.32 HISTORY OF GESTATIONAL DIABETES: ICD-10-CM

## 2025-08-04 DIAGNOSIS — L03.031 PARONYCHIA OF TOE OF RIGHT FOOT: Primary | ICD-10-CM

## 2025-08-04 LAB
EST. AVERAGE GLUCOSE BLD GHB EST-MCNC: 108 MG/DL (ref 68–126)
HBA1C MFR BLD: 5.4 % (ref ?–5.7)

## 2025-08-04 PROCEDURE — 83036 HEMOGLOBIN GLYCOSYLATED A1C: CPT | Performed by: STUDENT IN AN ORGANIZED HEALTH CARE EDUCATION/TRAINING PROGRAM

## 2025-08-04 RX ORDER — MUPIROCIN 2 %
OINTMENT (GRAM) TOPICAL
Qty: 30 G | Refills: 0 | Status: SHIPPED | OUTPATIENT
Start: 2025-08-04

## 2025-08-04 RX ORDER — CEFADROXIL 500 MG/1
500 CAPSULE ORAL 2 TIMES DAILY
Qty: 14 CAPSULE | Refills: 0 | Status: SHIPPED | OUTPATIENT
Start: 2025-08-04

## 2025-08-18 ENCOUNTER — OFFICE VISIT (OUTPATIENT)
Dept: PODIATRY CLINIC | Facility: CLINIC | Age: 35
End: 2025-08-18

## 2025-08-18 DIAGNOSIS — M79.674 PAIN OF TOE OF RIGHT FOOT: ICD-10-CM

## 2025-08-18 DIAGNOSIS — Z98.890 STATUS POST NAIL SURGERY: Primary | ICD-10-CM

## 2025-08-18 PROCEDURE — 99213 OFFICE O/P EST LOW 20 MIN: CPT | Performed by: PODIATRIST

## (undated) NOTE — LETTER
25    Re: Regina Garcias  : 1990    To Whom It May Concern:  Regina Garcias is under my care for pregnancy with an Estimated Date of Delivery: 3/24/25. She is being treated for several complications during her high risk pregnancy and we anticipate she may need to deliver her baby earlier than her due date.  It would be beneficial to help support her during the pregnancy and also care for her after delivery that her deandre Malcolm Ada to be allowed passage into the country. Again, the patient's due date is 3/24/25 however it is possible she delivers up to two weeks earlier than this date.  If you have any questions concerning this letter, please feel free to contact my office.      Sincerely yours,    Noa Wallace MD

## (undated) NOTE — MR AVS SNAPSHOT
[facility]  [address] [place], [facility] [place] [id number]               Thank you for choosing us for your health care visit with Edouard Morales DO.   We are glad to serve you and happy to health. Health counseling is essential, too. Below are guidelines for these, for women ages 25 to 44. Talk with your healthcare provider to make sure you’re up-to-date on what you need.   Screening Who needs it How often   Alcohol misuse All women in this a this infection or vaccine 2 doses; the second dose should be given 4 to 8 weeks after the first dose   Hepatitis A Women at increased risk for infection – talk with your healthcare provider 2 doses given at least 6 months apart   Hepatitis B Women at [place] When your risk is known   Diet and exercise Women who are overweight or obese When diagnosed, and then at routine exams   Domestic violence Women at the age in which they are able to have children At routine exams   Sexually transmitted infection preventio even advise not teaching women to do a BSE. That’s because research hasn’t shown a clear benefit to doing BSEs. Breast self-awareness is different than a BSE. Breast self-awareness isn’t about following a certain method and schedule.  It’s about knowing wh This list is accurate as of: 3/17/17  2:41 PM.  Always use your most recent med list.                ergocalciferol 39528 units Caps   Take 50,000 Units by mouth every 7 days.    Commonly known as:  DRISDOL/VITAMIN D2                   MyChart     Visit Get your heart pumping – brisk walking, biking, swimming Even 10 minute increments are effective and add up over the week   2 ½ hours per week – spread out over time Use a vanessa to keep you motivated   Don’t forget strength training with weights and resist

## (undated) NOTE — LETTER
02/15/22  Post Operative Nail/Wart Instructions    Soaking solution:   Options:  1. Betadine Solution:  2 cupfuls of Betadine to 1 quart of Cold water. (Generic name - Providone Iodine Solution)  2. [place] Powder:  2 packets to 1 quart of warm water   3. Epsom Salt: 1/2 cup of Epsom Salts to 1 quart of cold water. Soak for 10-15 minutes. Soaking Instructions:  Day of procedure:  Soak with the bandage on for at least 15 minutes and then pat the bandage dry. Soak only once today. Days following procedure:  Soak the area twice daily in soak solution listed above and apply medication listed below. Cover the area with a dressing or band-aids. Medication: Apply the following after each soaking  1. Cortisporin Cream (Mix Neosporin 0.5 Hydrocortisone cream in sandwich bag)  2. Neosporin cream  3. Betadine Ointment  4. Cortisporin Drops   General Information:  Drainage after nail procedures is normal and expected. If you have a nail procedure done, we encourage you to pull back on the skin or use a Q-tip to gently massage the skin away from the remaining nail bed. This can be done while soaking or after showering. This will encourage drainage from the procedure area. It is not uncommon for the drainage to become blocked soon after surgery. The area can become swollen and red giving the appearance of an infection. If this happens, increase the time you soak by 5 minutes and use the Q-tip method to help with the drainage. If after a few days this does not improve or worsens call the office immediately to make an appointment to see the physician.     If you have any questions or concerns, please call our office at [phone]

## (undated) NOTE — MR AVS SNAPSHOT
[facility]  [address] [facility] [place], [facility] [place] [id number]               Thank you for choosing us for your health care visit with Edmundo Amaya DO.   We are glad to serve you and happy to Commonly known as:  [facility]                Where to Get Your Medications      These medications were sent to [address] [facility], [facility] - [id number] AT [facility] OF [address], [phone], [phone]  [address], [place]

## (undated) NOTE — LETTER
25    Re: Regina Garcias  : 1990    To Whom It May Concern:  Regina Garcias is under my care for pregnancy with an Estimated Date of Delivery: 3/24/25. Due to her diagnosis of Advanced Maternal Age and Gestational Diabetes on Metformin, it is recommended by Maternal Fetal Medicine to be delivered between 38-39 weeks (3/10/25-3/17/25).  If you have any questions concerning this letter, please feel free to contact my office.      Sincerely yours,    RENÉ Arriaga

## (undated) NOTE — MR AVS SNAPSHOT
[facility]  [address] [facility] [place], [facility] [place] [phone]               Thank you for choosing us for your health care visit with Scooby Jewell DO.   We are glad to serve you and happy to Rinses help keep your sinuses and nose moist. Mix a teaspoon of salt in 8 ounces of fresh, warm water. Use a bulb syringe to gently squirt the water into your nose a few times a day. You can also buy ready-made saline nasal sprays.   Apply hot or cold packs · Primary amenorrhea is when a girl has not had her first period by age 15.   · Secondary amenorrhea is when:  ¨ A girl or woman who has been having normal periods stops getting them for 3 months in a row  ¨ A girl or woman who has been having irregular per ergocalciferol 85260 units Caps   Take 50,000 Units by mouth every 7 days.    Commonly known as:  DRISDOL/VITAMIN D2                Where to Get Your Medications      You can get these medications from any pharmacy     Bring a paper prescription for each o

## (undated) NOTE — LETTER
Date: 3/15/2023    Patient Name: Jhonny Nicholson   2/18/1990            To Whom it may concern: This letter has been written at the patient's request. The above patient was seen at the [facility] for treatment of a medical condition. This patient should be excused from attending work from 3/10/23 through 3/14/23. The patient may return to work/school on 3/15/23. Please feel free to contact me at [phone] with any questions.          Sincerely,    Victor M Loza, DO

## (undated) NOTE — LETTER
25    Re: Regina Garcias  : 1990    To Whom It May Concern:  Regina Garcias is under my care for pregnancy with an Estimated Date of Delivery: 3/24/25. Due to her diagnosis of Advanced Maternal Age and Gestational Diabetes on metformin, it is recommended by Maternal Fetal Medicine to be delivered between 38-39 weeks.   If you have any questions concerning this letter, please feel free to contact my office.      Sincerely yours,    RENÉ Arriaga

## (undated) NOTE — LETTER
25    Re: Regina Garcias  : 1990    To Whom It May Concern:  Regina Garcias is under my care for her pregnancy.  Please excuse her from school/work 3/12/25-3/14/2025  If you have any questions concerning this letter, please feel free to contact my office.      Sincerely yours,      RENÉ Arriaga